# Patient Record
Sex: MALE | Race: WHITE | NOT HISPANIC OR LATINO
[De-identification: names, ages, dates, MRNs, and addresses within clinical notes are randomized per-mention and may not be internally consistent; named-entity substitution may affect disease eponyms.]

---

## 2019-04-30 PROBLEM — Z00.00 ENCOUNTER FOR PREVENTIVE HEALTH EXAMINATION: Status: ACTIVE | Noted: 2019-04-30

## 2019-05-17 DIAGNOSIS — Z86.39 PERSONAL HISTORY OF OTHER ENDOCRINE, NUTRITIONAL AND METABOLIC DISEASE: ICD-10-CM

## 2019-05-17 DIAGNOSIS — Z86.19 PERSONAL HISTORY OF OTHER INFECTIOUS AND PARASITIC DISEASES: ICD-10-CM

## 2019-05-17 DIAGNOSIS — Z82.49 FAMILY HISTORY OF ISCHEMIC HEART DISEASE AND OTHER DISEASES OF THE CIRCULATORY SYSTEM: ICD-10-CM

## 2019-05-17 DIAGNOSIS — Z87.891 PERSONAL HISTORY OF NICOTINE DEPENDENCE: ICD-10-CM

## 2019-05-17 DIAGNOSIS — Z87.01 PERSONAL HISTORY OF PNEUMONIA (RECURRENT): ICD-10-CM

## 2019-05-21 ENCOUNTER — APPOINTMENT (OUTPATIENT)
Dept: CARDIOTHORACIC SURGERY | Facility: CLINIC | Age: 79
End: 2019-05-21
Payer: MEDICARE

## 2019-05-21 VITALS
SYSTOLIC BLOOD PRESSURE: 132 MMHG | DIASTOLIC BLOOD PRESSURE: 58 MMHG | WEIGHT: 152 LBS | OXYGEN SATURATION: 96 % | RESPIRATION RATE: 16 BRPM | BODY MASS INDEX: 23.04 KG/M2 | HEIGHT: 68 IN | HEART RATE: 86 BPM

## 2019-05-21 PROCEDURE — 99205 OFFICE O/P NEW HI 60 MIN: CPT

## 2019-05-21 NOTE — ASSESSMENT
[FreeTextEntry1] : Mr. Shay is a 78 year old male with severe tricuspid regurgitation and significant shortness of breath who needs. \par \par STANLEY\par CT scan of the chest\par PFTs\par \par he will see me after the testing to assess his conditions and decide if surgery will be helpful. \par I thank you for the opportunity to participate in the care of your patients. Please do not hesitate to contact me should you have any questions.\par

## 2019-05-21 NOTE — CONSULT LETTER
[Dear  ___] : Dear  [unfilled], [Consult Letter:] : I had the pleasure of evaluating your patient, [unfilled]. [Consult Closing:] : Thank you very much for allowing me to participate in the care of this patient.  If you have any questions, please do not hesitate to contact me. [Sincerely,] : Sincerely, [DrLluvia  ___] : Dr. CONNORS [FreeTextEntry2] : Spencer Caldwell MD [FreeTextEntry3] : Ulysses Lofton MD\par  of Cardiothoracic Surgery\par [facility]\par [address] [place] [address] \[place] [address]\par [phone]\par

## 2019-05-21 NOTE — PHYSICAL EXAM
[General Appearance - Alert] : alert [General Appearance - In No Acute Distress] : in no acute distress [Sclera] : the sclera and conjunctiva were normal [Outer Ear] : the ears and nose were normal in appearance [Neck Appearance] : the appearance of the neck was normal [] : no respiratory distress [Exaggerated Use Of Accessory Muscles For Inspiration] : no accessory muscle use [Apical Impulse] : the apical impulse was normal [Heart Sounds] : normal S1 and S2 [FreeTextEntry1] : II/VI systolic murmru at the right second intercostal space [Examination Of The Chest] : the chest was normal in appearance [Arterial Pulses Carotid] : carotid pulses were normal with no bruits [Arterial Pulses Femoral] : femoral pulses were normal without bruits [Bowel Sounds] : normal bowel sounds [Abdomen Soft] : soft [Cervical Lymph Nodes Enlarged Posterior Bilaterally] : posterior cervical [Abnormal Walk] : normal gait [Nail Clubbing] : no clubbing  or cyanosis of the fingernails [Skin Color & Pigmentation] : normal skin color and pigmentation [Skin Turgor] : normal skin turgor [Cranial Nerves] : cranial nerves 2-12 were intact

## 2019-05-21 NOTE — REVIEW OF SYSTEMS
[Feeling Poorly] : feeling poorly [Feeling Tired] : feeling tired [Shortness Of Breath] : shortness of breath [SOB on Exertion] : shortness of breath during exertion [Negative] : Heme/Lymph [Chest Pain] : no chest pain [Palpitations] : no palpitations [Lower Ext Edema] : no extremity edema [Orthopnea] : no orthopnea [PND] : no PND [FreeTextEntry7] : poor appetite, frequent loose stool

## 2019-05-21 NOTE — HISTORY OF PRESENT ILLNESS
[FreeTextEntry1] : Mr. ZHOU is a 78 year old male referred by Dr Luna who presents for consultation. His past medical history includes HTN, HLD, afib (Warfarin), pneumonia 2008 complicated by septic multisystem organ failure requiring hemodialysis (now recovered).  \par \par Mr Zhou reports significant shortness of breath that has become worse over the past 6 months.  He is now unable to walk more than 10 feet before becoming extremely short of breath.  Denies any chest pain, palpitations, syncope or near syncope.  \par \par He was diagnosed with PHTN after cardiac cath was preformed.  He was also evaluated by Dr Palacios (Pulmonology) who suggested cardiac surgery evaluation.\par \par Cath and ECHO were obtained in September and October of 2018.  ECHO demonstrates LVEF 50%, moderate MR, severe pulmonary HTN, RA pressures 6-10mmHg, moderate to severe tricuspid regurgitation.  Cardiac Cath reveals normal coronary arteries with PAP of 55/20, and PCWP 8mmHg.\par \par

## 2019-05-30 ENCOUNTER — OUTPATIENT (OUTPATIENT)
Dept: OUTPATIENT SERVICES | Facility: HOSPITAL | Age: 79
LOS: 1 days | End: 2019-05-30
Payer: MEDICARE

## 2019-05-30 DIAGNOSIS — I48.2 CHRONIC ATRIAL FIBRILLATION: ICD-10-CM

## 2019-05-30 DIAGNOSIS — Z96.641 PRESENCE OF RIGHT ARTIFICIAL HIP JOINT: Chronic | ICD-10-CM

## 2019-05-30 DIAGNOSIS — I34.0 NONRHEUMATIC MITRAL (VALVE) INSUFFICIENCY: ICD-10-CM

## 2019-05-30 DIAGNOSIS — I07.1 RHEUMATIC TRICUSPID INSUFFICIENCY: ICD-10-CM

## 2019-05-30 DIAGNOSIS — I26.99 OTHER PULMONARY EMBOLISM WITHOUT ACUTE COR PULMONALE: ICD-10-CM

## 2019-05-30 DIAGNOSIS — Z83.6 FAMILY HISTORY OF OTHER DISEASES OF THE RESPIRATORY SYSTEM: Chronic | ICD-10-CM

## 2019-05-30 DIAGNOSIS — I35.1 NONRHEUMATIC AORTIC (VALVE) INSUFFICIENCY: ICD-10-CM

## 2019-05-30 DIAGNOSIS — Z98.890 OTHER SPECIFIED POSTPROCEDURAL STATES: Chronic | ICD-10-CM

## 2019-05-30 DIAGNOSIS — I36.1 NONRHEUMATIC TRICUSPID (VALVE) INSUFFICIENCY: ICD-10-CM

## 2019-05-30 DIAGNOSIS — S11.91XA LACERATION WITHOUT FOREIGN BODY OF UNSPECIFIED PART OF NECK, INITIAL ENCOUNTER: Chronic | ICD-10-CM

## 2019-05-30 LAB
ANION GAP SERPL CALC-SCNC: 13 MMOL/L — SIGNIFICANT CHANGE UP (ref 5–17)
APTT BLD: 45.2 SEC — HIGH (ref 27.5–36.3)
BUN SERPL-MCNC: 20 MG/DL — SIGNIFICANT CHANGE UP (ref 8–20)
CALCIUM SERPL-MCNC: 9.6 MG/DL — SIGNIFICANT CHANGE UP (ref 8.6–10.2)
CHLORIDE SERPL-SCNC: 100 MMOL/L — SIGNIFICANT CHANGE UP (ref 98–107)
CO2 SERPL-SCNC: 26 MMOL/L — SIGNIFICANT CHANGE UP (ref 22–29)
CREAT SERPL-MCNC: 0.82 MG/DL — SIGNIFICANT CHANGE UP (ref 0.5–1.3)
GLUCOSE SERPL-MCNC: 93 MG/DL — SIGNIFICANT CHANGE UP (ref 70–115)
HCT VFR BLD CALC: 48.8 % — SIGNIFICANT CHANGE UP (ref 42–52)
HGB BLD-MCNC: 16.1 G/DL — SIGNIFICANT CHANGE UP (ref 14–18)
INR BLD: 3.73 RATIO — HIGH (ref 0.88–1.16)
MCHC RBC-ENTMCNC: 29.5 PG — SIGNIFICANT CHANGE UP (ref 27–31)
MCHC RBC-ENTMCNC: 33 G/DL — SIGNIFICANT CHANGE UP (ref 32–36)
MCV RBC AUTO: 89.5 FL — SIGNIFICANT CHANGE UP (ref 80–94)
PLATELET # BLD AUTO: 241 K/UL — SIGNIFICANT CHANGE UP (ref 150–400)
POTASSIUM SERPL-MCNC: 4.6 MMOL/L — SIGNIFICANT CHANGE UP (ref 3.5–5.3)
POTASSIUM SERPL-SCNC: 4.6 MMOL/L — SIGNIFICANT CHANGE UP (ref 3.5–5.3)
PROTHROM AB SERPL-ACNC: 44.7 SEC — HIGH (ref 10–12.9)
RBC # BLD: 5.45 M/UL — SIGNIFICANT CHANGE UP (ref 4.6–6.2)
RBC # FLD: 16.8 % — HIGH (ref 11–15.6)
SODIUM SERPL-SCNC: 139 MMOL/L — SIGNIFICANT CHANGE UP (ref 135–145)
WBC # BLD: 7 K/UL — SIGNIFICANT CHANGE UP (ref 4.8–10.8)
WBC # FLD AUTO: 7 K/UL — SIGNIFICANT CHANGE UP (ref 4.8–10.8)

## 2019-05-30 PROCEDURE — 85027 COMPLETE CBC AUTOMATED: CPT

## 2019-05-30 PROCEDURE — 36415 COLL VENOUS BLD VENIPUNCTURE: CPT

## 2019-05-30 PROCEDURE — 80048 BASIC METABOLIC PNL TOTAL CA: CPT

## 2019-05-30 PROCEDURE — 71275 CT ANGIOGRAPHY CHEST: CPT

## 2019-05-30 PROCEDURE — 71275 CT ANGIOGRAPHY CHEST: CPT | Mod: 26

## 2019-05-30 PROCEDURE — 93010 ELECTROCARDIOGRAM REPORT: CPT

## 2019-05-30 PROCEDURE — 85610 PROTHROMBIN TIME: CPT

## 2019-05-30 PROCEDURE — 93005 ELECTROCARDIOGRAM TRACING: CPT

## 2019-05-30 PROCEDURE — 85730 THROMBOPLASTIN TIME PARTIAL: CPT

## 2019-05-30 NOTE — H&P PST ADULT - NSICDXPASTMEDICALHX_GEN_ALL_CORE_FT
PAST MEDICAL HISTORY:  Chronic atrial fibrillation     Essential hypertension     Hemochromatosis     Hyperlipidemia, unspecified hyperlipidemia type     Non-rheumatic tricuspid valve insufficiency     Nonrheumatic aortic valve insufficiency     Pneumonia     Pulmonary hypertension     Sepsis

## 2019-05-30 NOTE — H&P PST ADULT - NEGATIVE CARDIOVASCULAR SYMPTOMS
no paroxysmal nocturnal dyspnea/no palpitations/no claudication/no peripheral edema/no chest pain/no orthopnea

## 2019-05-30 NOTE — H&P PST ADULT - REASON FOR ADMISSION
Launch [employer] and print the 'Prescriptions from this Visit' Report Tricuspid and aortic valve regurgitation.

## 2019-05-30 NOTE — H&P PST ADULT - NSICDXPROBLEM_GEN_ALL_CORE_FT
PROBLEM DIAGNOSES  Problem: Acute pulmonary embolism without acute cor pulmonale, unspecified pulmonary embolism type  Assessment and Plan: Dr. Dumont called and made aware. INR supratherapeutic on warfarin. Will make Dr. Luna aware and discuss change to Eliquis.    Problem: Chronic atrial fibrillation  Assessment and Plan: Dr. Dumont called and made aware. INR supratherapeutic on warfarin. Will make Dr. Luna aware and discuss change to Eliquis.    Problem: Non-rheumatic tricuspid valve insufficiency  Assessment and Plan: Will reschedule STANLEY and patient will F/U with Dr. Lofton    Problem: Nonrheumatic aortic valve insufficiency  Assessment and Plan: Will reschedule STANLEY and patient will F/U with Dr. Lofton    Problem: Non-rheumatic mitral regurgitation  Assessment and Plan: Will reschedule STANLEY and patient will F/U with Dr. Lofton

## 2019-05-30 NOTE — H&P PST ADULT - NSICDXFAMILYHX_GEN_ALL_CORE_FT
FAMILY HISTORY:  Father  Still living? Unknown  Family history of myocardial infarction, Age at diagnosis: 61-70    Mother  Still living? Unknown  Family history of dementia, Age at diagnosis: Age Unknown    Grandparent  Still living? Unknown  Family history of diabetes mellitus, Age at diagnosis: Age Unknown

## 2019-05-30 NOTE — H&P PST ADULT - NSICDXPASTSURGICALHX_GEN_ALL_CORE_FT
PAST SURGICAL HISTORY:  Family history of punctured lung     History of bunionectomy     History of hammer toe correction     History of shoulder surgery     History of total right hip replacement     Laceration of neck, complicated

## 2019-05-30 NOTE — H&P PST ADULT - NEGATIVE NEUROLOGICAL SYMPTOMS
no syncope/no difficulty walking/no loss of consciousness/no weakness/no generalized seizures/no transient paralysis/no hemiparesis/no loss of sensation/no headache/no focal seizures/no tremors/no vertigo

## 2019-05-30 NOTE — H&P PST ADULT - OTHER CARE PROVIDERS
Spencer Luna ([facility], [address] [place], [place], Phone: [phone], Fax: [phone]), José Luis Dumont (Pulmonary, [address], [place] [address], [phone])

## 2019-05-30 NOTE — H&P PST ADULT - HISTORY OF PRESENT ILLNESS
79y/o male former smoker with history of chronic AF (CHADS2-Vasc on warfarin), aortic and tricuspid valve regurgitation, HTN, HLD, hemochromatosis, and pulmonary hypertension who has been c/o progressively worsening ARROYO (NYHA functional class 3). He saw Dr. Dumont (Pulmonary) who did a pulmonary work-up including PFT, and referred him to CT Surgery for tricuspid valve evaluation. He is here for a STANLEY to better evaluate valvular function.    Echo: 9/21/2018       LVSF: Mildly decreased       EF: 50%       RVSF: Mildly enlarged, severe pulmonary hypertension (PASP: 68)       LA: Mildly enlarged       RA: Mildly enlarged       Mitral Valve: Mild to moderate MR       Aortic Valve: Moderate AR       Tricuspid Valve: Moderate to severe TR       Pulmonic Valve: Not mentioned       Pericardium: No evidence of effusion    LHC: 10/8/2018       LM: Normal       LAD: Normal       LCX: Normal       RCA: Normal  Right Heart Pressures:       RA: 4       RV: 54/5       PA: 56/20       PCWP: 7       CO: 3.56       CI: 1.95    Nuclear Stress Test: 10/1/2018       Protocol: Kenny       Exercised for: 3:30       Symptoms: Dyspnea       Stress EKG: No changes       DTS: 3.5       Nuclear Imaging: Negative for ischemia, no TID

## 2019-05-30 NOTE — H&P PST ADULT - ASSESSMENT
77y/o male former smoker with history of chronic AF (CHADS2-Vasc on warfarin), aortic and tricuspid valve regurgitation, HTN, HLD, hemochromatosis, and pulmonary hypertension who has been c/o progressively worsening ARROYO (NYHA functional class 3). He saw Dr. Dumont (Pulmonary) who did a pulmonary work-up including PFT, and referred him to CT Surgery for tricuspid valve evaluation. He is here for a STANLEY to better evaluate valvular function.

## 2019-06-11 PROBLEM — I10 ESSENTIAL (PRIMARY) HYPERTENSION: Chronic | Status: ACTIVE | Noted: 2019-05-30

## 2019-06-11 PROBLEM — I27.20 PULMONARY HYPERTENSION, UNSPECIFIED: Chronic | Status: ACTIVE | Noted: 2019-05-30

## 2019-06-11 PROBLEM — I36.1 NONRHEUMATIC TRICUSPID (VALVE) INSUFFICIENCY: Chronic | Status: ACTIVE | Noted: 2019-05-30

## 2019-06-11 PROBLEM — E78.5 HYPERLIPIDEMIA, UNSPECIFIED: Chronic | Status: ACTIVE | Noted: 2019-05-30

## 2019-06-11 PROBLEM — I48.2 CHRONIC ATRIAL FIBRILLATION: Chronic | Status: ACTIVE | Noted: 2019-05-30

## 2019-06-11 PROBLEM — J18.9 PNEUMONIA, UNSPECIFIED ORGANISM: Chronic | Status: ACTIVE | Noted: 2019-05-30

## 2019-06-11 PROBLEM — I35.1 NONRHEUMATIC AORTIC (VALVE) INSUFFICIENCY: Chronic | Status: ACTIVE | Noted: 2019-05-30

## 2019-06-11 PROBLEM — E83.119 HEMOCHROMATOSIS, UNSPECIFIED: Chronic | Status: ACTIVE | Noted: 2019-05-30

## 2019-06-13 ENCOUNTER — FORM ENCOUNTER (OUTPATIENT)
Age: 79
End: 2019-06-13

## 2019-06-14 ENCOUNTER — TRANSCRIPTION ENCOUNTER (OUTPATIENT)
Age: 79
End: 2019-06-14

## 2019-06-14 ENCOUNTER — OUTPATIENT (OUTPATIENT)
Dept: OUTPATIENT SERVICES | Facility: HOSPITAL | Age: 79
LOS: 1 days | End: 2019-06-14
Payer: MEDICARE

## 2019-06-14 DIAGNOSIS — I07.1 RHEUMATIC TRICUSPID INSUFFICIENCY: ICD-10-CM

## 2019-06-14 DIAGNOSIS — Z96.641 PRESENCE OF RIGHT ARTIFICIAL HIP JOINT: Chronic | ICD-10-CM

## 2019-06-14 DIAGNOSIS — Z98.890 OTHER SPECIFIED POSTPROCEDURAL STATES: Chronic | ICD-10-CM

## 2019-06-14 DIAGNOSIS — S11.91XA LACERATION WITHOUT FOREIGN BODY OF UNSPECIFIED PART OF NECK, INITIAL ENCOUNTER: Chronic | ICD-10-CM

## 2019-06-14 DIAGNOSIS — Z83.6 FAMILY HISTORY OF OTHER DISEASES OF THE RESPIRATORY SYSTEM: Chronic | ICD-10-CM

## 2019-06-14 LAB
INR BLD: 3.07 RATIO — HIGH (ref 0.88–1.16)
PROTHROM AB SERPL-ACNC: 36.5 SEC — HIGH (ref 10–12.9)

## 2019-06-14 PROCEDURE — 93010 ELECTROCARDIOGRAM REPORT: CPT

## 2019-06-14 PROCEDURE — 76376 3D RENDER W/INTRP POSTPROCES: CPT | Mod: 26

## 2019-06-14 PROCEDURE — 93320 DOPPLER ECHO COMPLETE: CPT | Mod: 26

## 2019-06-14 PROCEDURE — 93325 DOPPLER ECHO COLOR FLOW MAPG: CPT | Mod: 26

## 2019-06-14 PROCEDURE — 93312 ECHO TRANSESOPHAGEAL: CPT | Mod: 26

## 2019-06-14 RX ORDER — CITALOPRAM 10 MG/1
1 TABLET, FILM COATED ORAL
Qty: 0 | Refills: 0 | DISCHARGE

## 2019-06-14 NOTE — H&P PST ADULT - NSICDXPASTMEDICALHX_GEN_ALL_CORE_FT
PAST MEDICAL HISTORY:  Chronic atrial fibrillation     Essential hypertension     ETOH abuse     Hemochromatosis     Hyperlipidemia, unspecified hyperlipidemia type     Non-rheumatic tricuspid valve insufficiency     Nonrheumatic aortic valve insufficiency     Pneumonia     Pulmonary hypertension     Sepsis

## 2019-06-14 NOTE — ASU PATIENT PROFILE, ADULT - TEACHING/LEARNING LEARNING PREFERENCES
verbal instruction/pictorial/computer/internet/skill demonstration/written material/individual instruction

## 2019-06-14 NOTE — DISCHARGE NOTE NURSING/CASE MANAGEMENT/SOCIAL WORK - NSDCDPATPORTLINK_GEN_ALL_CORE
You can access the [facility] Portal, offered by [facility], by registering with the following website: [email or web address]

## 2019-06-14 NOTE — DISCHARGE NOTE PROVIDER - CARE PROVIDERS DIRECT ADDRESSES
[email or web address]

## 2019-06-14 NOTE — H&P PST ADULT - VENOUS THROMBOEMBOLISM
Spoke to patient, via telephone. Patient attended the colon class @ [facility]. Patient is scheduled with Dr Fuentes at the [facility], on 06/10/19. Patient's prep has already been sent to the pharmacy.     
no

## 2019-06-14 NOTE — PROGRESS NOTE ADULT - SUBJECTIVE AND OBJECTIVE BOX
Cardiology Nurse practitioner Note  Post STANLEY    Per verbal report: +PFO, severe TR, mod MR, mild AI.  Pt aurelia procedure well    VSS    Neuro: sleepy but easily arousable.  Orientated X3  Lungs: CTA  CV: RRR    Discharge to home when criteria met

## 2019-06-14 NOTE — DISCHARGE NOTE PROVIDER - NSDCCPTREATMENT_GEN_ALL_CORE_FT
PRINCIPAL PROCEDURE  Procedure: Transesophageal echocardiogram (STANLEY)  Findings and Treatment: severe TR, +pfo, mod MR

## 2019-06-14 NOTE — ASU PATIENT PROFILE, ADULT - PSH
Family history of punctured lung    History of bunionectomy    History of hammer toe correction    History of shoulder surgery    History of total right hip replacement    Laceration of neck, complicated

## 2019-06-14 NOTE — DISCHARGE NOTE PROVIDER - NSDCCPCAREPLAN_GEN_ALL_CORE_FT
PRINCIPAL DISCHARGE DIAGNOSIS  Diagnosis: Tricuspid regurgitation  Assessment and Plan of Treatment: f/U CTS

## 2019-06-14 NOTE — H&P PST ADULT - HISTORY OF PRESENT ILLNESS
78 year old male with pmhx of pHTN,  HTN, HLD, afib on coumadin, PNA 2008 c/b septic multisystme organ failure requiring hemodialysis now resolved. Pt with c/o increase SOB over the last 6 months and only able to walk 10 feet.    Echo revealed EF 50%, mod MR, severe pHTN, RA pressure 6-10 mmHg, mod to severe TR  Cardiac cath revealed normal coronary arteries with PAP of 55/20 and PCWP 8mmHg.  Pt had consult with Dr. Lofton for possible tricuspid valve repair.    For STANLEY to assess cardiac valves.

## 2019-06-14 NOTE — DISCHARGE NOTE PROVIDER - HOSPITAL COURSE
78 year old male with h/o pHTN, HTN, Afib on coumadin who has been c/o increasing SOB over the last 6 months.  TTE revealed mod MR, severe pHTN and mod to  TR.  Now s/p STANLEY which revealed +PFO, severe TR, mod MR and mild AI.  Pt will f/u with Dr. Lofton for possible surgical intervention.

## 2019-06-14 NOTE — DISCHARGE NOTE PROVIDER - CARE PROVIDER_API CALL
Ulysses Lofton)  Surgery; Thoracic and Cardiac Surgery  [address] [place] [address]  Phone: [phone]  Fax: [phone]  Follow Up Time:     Spencer Luna)  Cardiovascular Disease  Phone: [phone]  Follow Up Time:

## 2019-06-14 NOTE — ASU PATIENT PROFILE, ADULT - PMH
Chronic atrial fibrillation    Essential hypertension    ETOH abuse    Hemochromatosis    Hyperlipidemia, unspecified hyperlipidemia type    Non-rheumatic tricuspid valve insufficiency    Nonrheumatic aortic valve insufficiency    Pneumonia    Pulmonary hypertension    Sepsis

## 2019-06-14 NOTE — H&P PST ADULT - ASSESSMENT
78 year old male with pHTN, HTN, MR, TR with c/o increasing SOB for STANLEY to assess cardiac valves.

## 2019-06-16 ENCOUNTER — EMERGENCY (EMERGENCY)
Facility: HOSPITAL | Age: 79
LOS: 1 days | End: 2019-06-16
Admitting: EMERGENCY MEDICINE
Payer: MEDICARE

## 2019-06-16 DIAGNOSIS — Z98.890 OTHER SPECIFIED POSTPROCEDURAL STATES: Chronic | ICD-10-CM

## 2019-06-16 DIAGNOSIS — S11.91XA LACERATION WITHOUT FOREIGN BODY OF UNSPECIFIED PART OF NECK, INITIAL ENCOUNTER: Chronic | ICD-10-CM

## 2019-06-16 DIAGNOSIS — Z83.6 FAMILY HISTORY OF OTHER DISEASES OF THE RESPIRATORY SYSTEM: Chronic | ICD-10-CM

## 2019-06-16 DIAGNOSIS — Z96.641 PRESENCE OF RIGHT ARTIFICIAL HIP JOINT: Chronic | ICD-10-CM

## 2019-06-16 PROCEDURE — 72125 CT NECK SPINE W/O DYE: CPT | Mod: 26

## 2019-06-16 PROCEDURE — 99285 EMERGENCY DEPT VISIT HI MDM: CPT

## 2019-06-16 PROCEDURE — 70450 CT HEAD/BRAIN W/O DYE: CPT | Mod: 26

## 2019-06-17 ENCOUNTER — INPATIENT (INPATIENT)
Facility: HOSPITAL | Age: 79
LOS: 3 days | Discharge: HOME | DRG: 64 | End: 2019-06-21
Attending: HOSPITALIST | Admitting: STUDENT IN AN ORGANIZED HEALTH CARE EDUCATION/TRAINING PROGRAM
Payer: MEDICARE

## 2019-06-17 VITALS
RESPIRATION RATE: 27 BRPM | OXYGEN SATURATION: 97 % | HEART RATE: 74 BPM | DIASTOLIC BLOOD PRESSURE: 62 MMHG | HEIGHT: 68 IN | WEIGHT: 153.88 LBS | SYSTOLIC BLOOD PRESSURE: 117 MMHG | TEMPERATURE: 98 F

## 2019-06-17 DIAGNOSIS — S11.91XA LACERATION WITHOUT FOREIGN BODY OF UNSPECIFIED PART OF NECK, INITIAL ENCOUNTER: Chronic | ICD-10-CM

## 2019-06-17 DIAGNOSIS — I60.9 NONTRAUMATIC SUBARACHNOID HEMORRHAGE, UNSPECIFIED: ICD-10-CM

## 2019-06-17 DIAGNOSIS — Z96.641 PRESENCE OF RIGHT ARTIFICIAL HIP JOINT: Chronic | ICD-10-CM

## 2019-06-17 DIAGNOSIS — Z98.890 OTHER SPECIFIED POSTPROCEDURAL STATES: Chronic | ICD-10-CM

## 2019-06-17 DIAGNOSIS — Z83.6 FAMILY HISTORY OF OTHER DISEASES OF THE RESPIRATORY SYSTEM: Chronic | ICD-10-CM

## 2019-06-17 DIAGNOSIS — S06.6X0A TRAUMATIC SUBARACHNOID HEMORRHAGE WITHOUT LOSS OF CONSCIOUSNESS, INITIAL ENCOUNTER: ICD-10-CM

## 2019-06-17 PROBLEM — F10.10 ALCOHOL ABUSE, UNCOMPLICATED: Chronic | Status: ACTIVE | Noted: 2019-06-14

## 2019-06-17 LAB
ABO RH CONFIRMATION: SIGNIFICANT CHANGE UP
ALBUMIN SERPL ELPH-MCNC: 3.8 G/DL — SIGNIFICANT CHANGE UP (ref 3.3–5.2)
ALP SERPL-CCNC: 92 U/L — SIGNIFICANT CHANGE UP (ref 40–120)
ALT FLD-CCNC: 32 U/L — SIGNIFICANT CHANGE UP
ANION GAP SERPL CALC-SCNC: 14 MMOL/L — SIGNIFICANT CHANGE UP (ref 5–17)
ANION GAP SERPL CALC-SCNC: 14 MMOL/L — SIGNIFICANT CHANGE UP (ref 5–17)
APPEARANCE UR: CLEAR — SIGNIFICANT CHANGE UP
APTT BLD: 29.6 SEC — SIGNIFICANT CHANGE UP (ref 27.5–36.3)
AST SERPL-CCNC: 51 U/L — HIGH
BASOPHILS # BLD AUTO: 0 K/UL — SIGNIFICANT CHANGE UP (ref 0–0.2)
BASOPHILS # BLD AUTO: 0 K/UL — SIGNIFICANT CHANGE UP (ref 0–0.2)
BASOPHILS NFR BLD AUTO: 0.1 % — SIGNIFICANT CHANGE UP (ref 0–2)
BASOPHILS NFR BLD AUTO: 0.1 % — SIGNIFICANT CHANGE UP (ref 0–2)
BILIRUB SERPL-MCNC: 0.9 MG/DL — SIGNIFICANT CHANGE UP (ref 0.4–2)
BILIRUB UR-MCNC: NEGATIVE — SIGNIFICANT CHANGE UP
BLD GP AB SCN SERPL QL: SIGNIFICANT CHANGE UP
BUN SERPL-MCNC: 21 MG/DL — HIGH (ref 8–20)
BUN SERPL-MCNC: 22 MG/DL — HIGH (ref 8–20)
CALCIUM SERPL-MCNC: 8.7 MG/DL — SIGNIFICANT CHANGE UP (ref 8.6–10.2)
CALCIUM SERPL-MCNC: 8.9 MG/DL — SIGNIFICANT CHANGE UP (ref 8.6–10.2)
CHLORIDE SERPL-SCNC: 104 MMOL/L — SIGNIFICANT CHANGE UP (ref 98–107)
CHLORIDE SERPL-SCNC: 107 MMOL/L — SIGNIFICANT CHANGE UP (ref 98–107)
CO2 SERPL-SCNC: 22 MMOL/L — SIGNIFICANT CHANGE UP (ref 22–29)
CO2 SERPL-SCNC: 24 MMOL/L — SIGNIFICANT CHANGE UP (ref 22–29)
COLOR SPEC: YELLOW — SIGNIFICANT CHANGE UP
CREAT SERPL-MCNC: 0.81 MG/DL — SIGNIFICANT CHANGE UP (ref 0.5–1.3)
CREAT SERPL-MCNC: 1.04 MG/DL — SIGNIFICANT CHANGE UP (ref 0.5–1.3)
DIFF PNL FLD: ABNORMAL
EOSINOPHIL # BLD AUTO: 0 K/UL — SIGNIFICANT CHANGE UP (ref 0–0.5)
EOSINOPHIL # BLD AUTO: 0.1 K/UL — SIGNIFICANT CHANGE UP (ref 0–0.5)
EOSINOPHIL NFR BLD AUTO: 0.1 % — SIGNIFICANT CHANGE UP (ref 0–5)
EOSINOPHIL NFR BLD AUTO: 1.3 % — SIGNIFICANT CHANGE UP (ref 0–5)
ETHANOL SERPL-MCNC: 15 MG/DL — SIGNIFICANT CHANGE UP
GAS PNL BLDA: SIGNIFICANT CHANGE UP
GLUCOSE BLDC GLUCOMTR-MCNC: 122 MG/DL — HIGH (ref 70–99)
GLUCOSE BLDC GLUCOMTR-MCNC: 85 MG/DL — SIGNIFICANT CHANGE UP (ref 70–99)
GLUCOSE BLDC GLUCOMTR-MCNC: 98 MG/DL — SIGNIFICANT CHANGE UP (ref 70–99)
GLUCOSE SERPL-MCNC: 137 MG/DL — HIGH (ref 70–115)
GLUCOSE SERPL-MCNC: 139 MG/DL — HIGH (ref 70–115)
GLUCOSE UR QL: NEGATIVE MG/DL — SIGNIFICANT CHANGE UP
HCT VFR BLD CALC: 43.5 % — SIGNIFICANT CHANGE UP (ref 42–52)
HCT VFR BLD CALC: 43.9 % — SIGNIFICANT CHANGE UP (ref 42–52)
HGB BLD-MCNC: 13.8 G/DL — LOW (ref 14–18)
HGB BLD-MCNC: 14.1 G/DL — SIGNIFICANT CHANGE UP (ref 14–18)
INR BLD: 1.23 RATIO — HIGH (ref 0.88–1.16)
KETONES UR-MCNC: NEGATIVE — SIGNIFICANT CHANGE UP
LACTATE SERPL-SCNC: 1.3 MMOL/L — SIGNIFICANT CHANGE UP (ref 0.5–2)
LACTATE SERPL-SCNC: 2.6 MMOL/L — HIGH (ref 0.5–2)
LEUKOCYTE ESTERASE UR-ACNC: ABNORMAL
LIDOCAIN IGE QN: 44 U/L — SIGNIFICANT CHANGE UP (ref 22–51)
LYMPHOCYTES # BLD AUTO: 0.7 K/UL — LOW (ref 1–4.8)
LYMPHOCYTES # BLD AUTO: 1.1 K/UL — SIGNIFICANT CHANGE UP (ref 1–4.8)
LYMPHOCYTES # BLD AUTO: 16.1 % — LOW (ref 20–55)
LYMPHOCYTES # BLD AUTO: 8.6 % — LOW (ref 20–55)
MAGNESIUM SERPL-MCNC: 1.9 MG/DL — SIGNIFICANT CHANGE UP (ref 1.6–2.6)
MCHC RBC-ENTMCNC: 29.1 PG — SIGNIFICANT CHANGE UP (ref 27–31)
MCHC RBC-ENTMCNC: 29.1 PG — SIGNIFICANT CHANGE UP (ref 27–31)
MCHC RBC-ENTMCNC: 31.7 G/DL — LOW (ref 32–36)
MCHC RBC-ENTMCNC: 32.1 G/DL — SIGNIFICANT CHANGE UP (ref 32–36)
MCV RBC AUTO: 90.7 FL — SIGNIFICANT CHANGE UP (ref 80–94)
MCV RBC AUTO: 91.6 FL — SIGNIFICANT CHANGE UP (ref 80–94)
MONOCYTES # BLD AUTO: 0.7 K/UL — SIGNIFICANT CHANGE UP (ref 0–0.8)
MONOCYTES # BLD AUTO: 0.8 K/UL — SIGNIFICANT CHANGE UP (ref 0–0.8)
MONOCYTES NFR BLD AUTO: 9 % — SIGNIFICANT CHANGE UP (ref 3–10)
MONOCYTES NFR BLD AUTO: 9.8 % — SIGNIFICANT CHANGE UP (ref 3–10)
NEUTROPHILS # BLD AUTO: 5 K/UL — SIGNIFICANT CHANGE UP (ref 1.8–8)
NEUTROPHILS # BLD AUTO: 7 K/UL — SIGNIFICANT CHANGE UP (ref 1.8–8)
NEUTROPHILS NFR BLD AUTO: 72.4 % — SIGNIFICANT CHANGE UP (ref 37–73)
NEUTROPHILS NFR BLD AUTO: 82 % — HIGH (ref 37–73)
NITRITE UR-MCNC: NEGATIVE — SIGNIFICANT CHANGE UP
PH UR: 5 — SIGNIFICANT CHANGE UP (ref 5–8)
PHOSPHATE SERPL-MCNC: 3.1 MG/DL — SIGNIFICANT CHANGE UP (ref 2.4–4.7)
PLATELET # BLD AUTO: 226 K/UL — SIGNIFICANT CHANGE UP (ref 150–400)
PLATELET # BLD AUTO: 234 K/UL — SIGNIFICANT CHANGE UP (ref 150–400)
POTASSIUM SERPL-MCNC: 3.9 MMOL/L — SIGNIFICANT CHANGE UP (ref 3.5–5.3)
POTASSIUM SERPL-MCNC: 4 MMOL/L — SIGNIFICANT CHANGE UP (ref 3.5–5.3)
POTASSIUM SERPL-SCNC: 3.9 MMOL/L — SIGNIFICANT CHANGE UP (ref 3.5–5.3)
POTASSIUM SERPL-SCNC: 4 MMOL/L — SIGNIFICANT CHANGE UP (ref 3.5–5.3)
PROT SERPL-MCNC: 6.8 G/DL — SIGNIFICANT CHANGE UP (ref 6.6–8.7)
PROT UR-MCNC: 30 MG/DL
PROTHROM AB SERPL-ACNC: 14.2 SEC — HIGH (ref 10–12.9)
RBC # BLD: 4.75 M/UL — SIGNIFICANT CHANGE UP (ref 4.6–6.2)
RBC # BLD: 4.84 M/UL — SIGNIFICANT CHANGE UP (ref 4.6–6.2)
RBC # FLD: 17.4 % — HIGH (ref 11–15.6)
RBC # FLD: 17.5 % — HIGH (ref 11–15.6)
SODIUM SERPL-SCNC: 140 MMOL/L — SIGNIFICANT CHANGE UP (ref 135–145)
SODIUM SERPL-SCNC: 145 MMOL/L — SIGNIFICANT CHANGE UP (ref 135–145)
SP GR SPEC: 1.02 — SIGNIFICANT CHANGE UP (ref 1.01–1.02)
TYPE + AB SCN PNL BLD: SIGNIFICANT CHANGE UP
UROBILINOGEN FLD QL: 4 MG/DL
WBC # BLD: 7 K/UL — SIGNIFICANT CHANGE UP (ref 4.8–10.8)
WBC # BLD: 8.5 K/UL — SIGNIFICANT CHANGE UP (ref 4.8–10.8)
WBC # FLD AUTO: 7 K/UL — SIGNIFICANT CHANGE UP (ref 4.8–10.8)
WBC # FLD AUTO: 8.5 K/UL — SIGNIFICANT CHANGE UP (ref 4.8–10.8)

## 2019-06-17 PROCEDURE — 99285 EMERGENCY DEPT VISIT HI MDM: CPT

## 2019-06-17 PROCEDURE — 93970 EXTREMITY STUDY: CPT | Mod: 26

## 2019-06-17 PROCEDURE — 93010 ELECTROCARDIOGRAM REPORT: CPT

## 2019-06-17 PROCEDURE — 71045 X-RAY EXAM CHEST 1 VIEW: CPT | Mod: 26

## 2019-06-17 PROCEDURE — 99221 1ST HOSP IP/OBS SF/LOW 40: CPT

## 2019-06-17 PROCEDURE — 70450 CT HEAD/BRAIN W/O DYE: CPT | Mod: 26

## 2019-06-17 PROCEDURE — 99223 1ST HOSP IP/OBS HIGH 75: CPT

## 2019-06-17 PROCEDURE — 99232 SBSQ HOSP IP/OBS MODERATE 35: CPT

## 2019-06-17 RX ORDER — ATORVASTATIN CALCIUM 80 MG/1
10 TABLET, FILM COATED ORAL AT BEDTIME
Refills: 0 | Status: DISCONTINUED | OUTPATIENT
Start: 2019-06-17 | End: 2019-06-19

## 2019-06-17 RX ORDER — LANOLIN ALCOHOL/MO/W.PET/CERES
3 CREAM (GRAM) TOPICAL AT BEDTIME
Refills: 0 | Status: DISCONTINUED | OUTPATIENT
Start: 2019-06-17 | End: 2019-06-21

## 2019-06-17 RX ORDER — ONDANSETRON 8 MG/1
4 TABLET, FILM COATED ORAL EVERY 6 HOURS
Refills: 0 | Status: DISCONTINUED | OUTPATIENT
Start: 2019-06-17 | End: 2019-06-21

## 2019-06-17 RX ORDER — PANTOPRAZOLE SODIUM 20 MG/1
40 TABLET, DELAYED RELEASE ORAL DAILY
Refills: 0 | Status: DISCONTINUED | OUTPATIENT
Start: 2019-06-17 | End: 2019-06-21

## 2019-06-17 RX ORDER — METOPROLOL TARTRATE 50 MG
12.5 TABLET ORAL DAILY
Refills: 0 | Status: DISCONTINUED | OUTPATIENT
Start: 2019-06-17 | End: 2019-06-17

## 2019-06-17 RX ORDER — CHLORHEXIDINE GLUCONATE 213 G/1000ML
1 SOLUTION TOPICAL
Refills: 0 | Status: DISCONTINUED | OUTPATIENT
Start: 2019-06-17 | End: 2019-06-21

## 2019-06-17 RX ORDER — SODIUM CHLORIDE 9 MG/ML
500 INJECTION INTRAMUSCULAR; INTRAVENOUS; SUBCUTANEOUS ONCE
Refills: 0 | Status: COMPLETED | OUTPATIENT
Start: 2019-06-17 | End: 2019-06-17

## 2019-06-17 RX ORDER — ACETAMINOPHEN 500 MG
650 TABLET ORAL EVERY 6 HOURS
Refills: 0 | Status: DISCONTINUED | OUTPATIENT
Start: 2019-06-17 | End: 2019-06-21

## 2019-06-17 RX ORDER — CITALOPRAM 10 MG/1
20 TABLET, FILM COATED ORAL DAILY
Refills: 0 | Status: DISCONTINUED | OUTPATIENT
Start: 2019-06-17 | End: 2019-06-21

## 2019-06-17 RX ORDER — SODIUM CHLORIDE 9 MG/ML
1000 INJECTION, SOLUTION INTRAVENOUS
Refills: 0 | Status: DISCONTINUED | OUTPATIENT
Start: 2019-06-17 | End: 2019-06-18

## 2019-06-17 RX ORDER — DOCUSATE SODIUM 100 MG
100 CAPSULE ORAL EVERY 8 HOURS
Refills: 0 | Status: DISCONTINUED | OUTPATIENT
Start: 2019-06-17 | End: 2019-06-21

## 2019-06-17 RX ORDER — SODIUM CHLORIDE 9 MG/ML
500 INJECTION, SOLUTION INTRAVENOUS ONCE
Refills: 0 | Status: COMPLETED | OUTPATIENT
Start: 2019-06-17 | End: 2019-06-17

## 2019-06-17 RX ORDER — SODIUM CHLORIDE 9 MG/ML
1000 INJECTION INTRAMUSCULAR; INTRAVENOUS; SUBCUTANEOUS
Refills: 0 | Status: DISCONTINUED | OUTPATIENT
Start: 2019-06-17 | End: 2019-06-17

## 2019-06-17 RX ORDER — METOPROLOL TARTRATE 50 MG
12.5 TABLET ORAL DAILY
Refills: 0 | Status: DISCONTINUED | OUTPATIENT
Start: 2019-06-17 | End: 2019-06-18

## 2019-06-17 RX ORDER — SENNA PLUS 8.6 MG/1
2 TABLET ORAL AT BEDTIME
Refills: 0 | Status: DISCONTINUED | OUTPATIENT
Start: 2019-06-17 | End: 2019-06-21

## 2019-06-17 RX ADMIN — SODIUM CHLORIDE 250 MILLILITER(S): 9 INJECTION INTRAMUSCULAR; INTRAVENOUS; SUBCUTANEOUS at 01:42

## 2019-06-17 RX ADMIN — Medication 12.5 MILLIGRAM(S): at 05:34

## 2019-06-17 RX ADMIN — Medication 650 MILLIGRAM(S): at 23:50

## 2019-06-17 RX ADMIN — SODIUM CHLORIDE 75 MILLILITER(S): 9 INJECTION INTRAMUSCULAR; INTRAVENOUS; SUBCUTANEOUS at 01:17

## 2019-06-17 RX ADMIN — SODIUM CHLORIDE 500 MILLILITER(S): 9 INJECTION, SOLUTION INTRAVENOUS at 13:41

## 2019-06-17 RX ADMIN — Medication 650 MILLIGRAM(S): at 17:07

## 2019-06-17 RX ADMIN — Medication 100 MILLIGRAM(S): at 05:34

## 2019-06-17 RX ADMIN — SODIUM CHLORIDE 75 MILLILITER(S): 9 INJECTION, SOLUTION INTRAVENOUS at 22:08

## 2019-06-17 RX ADMIN — Medication 650 MILLIGRAM(S): at 11:02

## 2019-06-17 RX ADMIN — Medication 3 MILLIGRAM(S): at 21:50

## 2019-06-17 RX ADMIN — PANTOPRAZOLE SODIUM 40 MILLIGRAM(S): 20 TABLET, DELAYED RELEASE ORAL at 11:02

## 2019-06-17 RX ADMIN — SODIUM CHLORIDE 75 MILLILITER(S): 9 INJECTION INTRAMUSCULAR; INTRAVENOUS; SUBCUTANEOUS at 01:40

## 2019-06-17 RX ADMIN — CITALOPRAM 20 MILLIGRAM(S): 10 TABLET, FILM COATED ORAL at 11:02

## 2019-06-17 RX ADMIN — Medication 650 MILLIGRAM(S): at 05:36

## 2019-06-17 RX ADMIN — CHLORHEXIDINE GLUCONATE 1 APPLICATION(S): 213 SOLUTION TOPICAL at 11:02

## 2019-06-17 RX ADMIN — ATORVASTATIN CALCIUM 10 MILLIGRAM(S): 80 TABLET, FILM COATED ORAL at 21:50

## 2019-06-17 RX ADMIN — SODIUM CHLORIDE 75 MILLILITER(S): 9 INJECTION, SOLUTION INTRAVENOUS at 13:41

## 2019-06-17 RX ADMIN — Medication 100 MILLIGRAM(S): at 21:50

## 2019-06-17 RX ADMIN — Medication 650 MILLIGRAM(S): at 05:34

## 2019-06-17 NOTE — CONSULT NOTE ADULT - ASSESSMENT
A/P:  77 y/o M with a PMHx of pulmonary hypertension, severe TR (on STANLEY 06/14/19), large PFO, Afib on coumadin, Pulmonary embolism (CTA 05/30/19), PNA in 2008 complicated by septic multisystem organ failure requiring HD, resolved, Hemochromatosis, HTN, HLD, and who was transferred from [facility] after fall with questionable syncope with right SAH. Patient states that he was drinking beers and a double screwdriver yesterday, and thinks he slipped and fell on the floor. Unclear if patient had a syncopal event or not, patient doesn't think he passed out, but can't be completely sure. Patient states that he has been experiencing dyspnea on exertion for a long time, being worked up by Pulm, CT surgery, and Cardiology. Patient  is currently resting comfortably, denies SOB at rest. Patient hasn't followed up with his Cardiologist since his recent STANLEY. Patient denies any fevers, chills, CP, LE swelling, headache, dizziness, abdominal pain, N/V/D, vision changes, unilateral weakness.     1. Severe Pulmonary HTN and RV failure  - WHO class 4   - PAP 55/20 and PCWP 8 mmHG on cardiac cath 10/2018  - STANLEY on 06/14/19 showed EF 50-55%, severe pulmonary HTN, severely reduced RV fxn, severe TR, and + PFO  - Not decompensated at this time, chronic right heart failure  - Recommend Pulm evaluation  - No indication for diuresis, ACEi, or aldactone  - CT surgery to re-evaluate for TR, PFO, and PEs     2. NSVT  - Secondary to severe right heart failure   - D/C metoprolol tartrate, trial Toprol XL 25mg qday (start at 5 PM tonight)  - Continue telemetry monitoring  - Keep K>4, Mg>2    3. Pulmonary Embolism  - RUL and RLL segmental nonobstructing PE noted on CTA 05/30/19  - CT Surgery to evaluate and comment on whether candidate for pulmonary endarterectomy  - Restart coumadin when cleared from neurosurgery for right SAH    4. Afib  - Currently in SB   - Toprol XL 25 daily  - Restart coumadin as above    5. Hemochromatosis  - Heme consult A/P:  79 y/o M with a PMHx of pulmonary hypertension, severe TR (on STANLEY 06/14/19), large PFO, Afib on coumadin, Pulmonary embolism (CTA 05/30/19), PNA in 2008 complicated by septic multisystem organ failure requiring HD, resolved, Hemochromatosis, HTN, HLD, and who [place] [facility] [place] with questionable syncope with right SAH. Patient states that he was drinking beers and a double screwdriver yesterday, and thinks he slipped and fell on the floor. Unclear if patient had a syncopal event or not, patient doesn't think he passed out, but can't be completely sure. Patient states that he has been experiencing dyspnea on exertion for a long time, being worked up by Pulm, CT surgery, and Cardiology. Patient  is currently resting comfortably, denies SOB at rest. Patient hasn't followed up with his Cardiologist since his recent STANLEY. Patient denies any fevers, chills, CP, LE swelling, headache, dizziness, abdominal pain, N/V/D, vision changes, unilateral weakness.     1. Severe Pulmonary HTN and RV failure  - WHO class 4   - PAP 55/20 and PCWP 8 mmHG on cardiac cath 10/2018  - STANLEY on 06/14/19 showed EF 50-55%, severe pulmonary HTN, severely reduced RV fxn, severe TR, and + PFO  - Not decompensated at this time, chronic right heart failure  - Recommend Pulm evaluation  - No indication for diuresis, ACEi, or aldactone  - CT surgery to re-evaluate for TR, PFO, and PEs     2. NSVT  - Secondary to severe right heart failure   - D/C metoprolol tartrate, trial Toprol XL 25mg qday (start at 5 PM tonight)  - Continue telemetry monitoring  - Keep K>4, Mg>2    3. Pulmonary Embolism  - RUL and RLL segmental nonobstructing PE noted on CTA 05/30/19  - Recommend CTA chest today to r/o new acute Pulmonary embolism in setting of NSVT and ?syncope   - CT Surgery to evaluate and comment on whether candidate for pulmonary endarterectomy  - Restart coumadin when cleared from neurosurgery for right SAH    4. Afib  - Currently in SB   - Toprol XL 25 daily  - Restart coumadin as above    5. Hemochromatosis  - Heme consult A/P:  77 y/o M with a PMHx of pulmonary hypertension, severe TR (on STANLEY 06/14/19), large PFO, Afib on coumadin, Pulmonary embolism (CTA 05/30/19), PNA in 2008 complicated by septic multisystem organ failure requiring HD, resolved, Hemochromatosis, HTN, HLD, and who was [place] [facility] [place] with questionable syncope with right SAH. Patient states that he was drinking beers and a double screwdriver yesterday, and thinks he slipped and fell on the floor. Unclear if patient had a syncopal event or not, patient doesn't think he passed out, but can't be completely sure. Patient states that he has been experiencing dyspnea on exertion for a long time, being worked up by Pulm, CT surgery, and Cardiology. Patient  is currently resting comfortably, denies SOB at rest. Patient hasn't followed up with his Cardiologist since his recent STANLEY. Patient denies any fevers, chills, CP, LE swelling, headache, dizziness, abdominal pain, N/V/D, vision changes, unilateral weakness.     1. Severe Pulmonary HTN and RV failure  - WHO class 4   - PAP 55/20 and PCWP 8 mmHG on cardiac cath 10/2018  - STANLEY on 06/14/19 showed EF 50-55%, severe pulmonary HTN, severely reduced RV fxn, severe TR, and + PFO  - Not decompensated at this time, chronic right heart failure  - Recommend Pulm evaluation  - Recommend V/Q scan as well.   - No indication for diuresis, ACEi, or aldactone  - CT surgery to re-evaluate for TR, PFO, and PEs   - Plan for eventual RHC     2. NSVT  - Secondary to severe right heart failure   - D/C metoprolol tartrate, trial Toprol XL 25mg qday (start at 5 PM tonight)  - Continue telemetry monitoring  - Keep K>4, Mg>2    3. Pulmonary Embolism  - RUL and RLL segmental non-obstructing PE noted on CTA 05/30/19  - Recommend CTA chest today to r/o new acute Pulmonary embolism in setting of NSVT and ?syncope   - CT Surgery to evaluate and comment on whether pt is a candidate for pulmonary endarterectomy  - Restart coumadin when cleared from neurosurgery for right SAH    4. Afib  - Currently in SB   - Toprol XL 25 daily  - Restart coumadin as above    5. Hemochromatosis  - Heme consult

## 2019-06-17 NOTE — PROGRESS NOTE ADULT - ATTENDING COMMENTS
NSGY Attg:    see above    CT likely consistent with calcification    consider MRI brain to definitively assess ICH versus calcification

## 2019-06-17 NOTE — CONSULT NOTE ADULT - ASSESSMENT
s/p fall, no LOC, found to have a tiny amount of high medial parietal SAH. No IPH or calvarial Fx, chronic findings on [facility]  + coumadin for AFib, INR 5.1 at [facility]  pt at his baseline and grossly intact   code B activated/ at scene at 00:06 & images from CD reviewed     case d/w Dr Cummings and images reviewed   no immediate NSx intervention indicated at this time  neuro-checks q1 HR and ICU admit  repeat CTB w/o contrast at 6 AM - ACS team aware   HOB > 30 degrees  hold all AC/ pt denied ASA/ NSAIDs use   further care as per primary team

## 2019-06-17 NOTE — ED PROVIDER NOTE - MUSCULOSKELETAL, MLM
Spine appears normal, range of motion is not limited, no muscle or joint tenderness Spine appears normal, range of motion is not limited, no muscle or joint tenderness. No spinal tenderness, no step offs

## 2019-06-17 NOTE — ED PROVIDER NOTE - CARDIAC, MLM
Normal rate, regular rhythm.  Heart sounds S1, S2.  No murmurs, rubs or gallops. No chest wall tenderness. NO crepitus. Normal rate, regular rhythm.  Heart sounds S1, S2.  No murmurs, rubs or gallops. No chest wall tenderness. No crepitus.

## 2019-06-17 NOTE — H&P ADULT - HISTORY OF PRESENT ILLNESS
78 year old male transferred from [facility], admitted for witnessed mechanical fall while on Coumadin for Afib. CT head done in [facility] revealed right SAH, patient GCS 15 upon arrival. Admitted to SICU.    Primary Survey:  A: Protected, patient conversing  B: CTAB. Symmetrical chest rise  C: 2+ central (femoral) & peripheral pulses (Radial, DP)  D: GCS 15, MAEO, interacting. No andres disability noted  E: No gross deformities on primary exposure      CXR: Negative for evidence of hemo/pneumothorax    Secondary Survey:  Constitutional: Well-developed well nourished Male in no acute distress  HEENT: Head is normocephalic and atraumatic, maxillofacial structures stable, no blood or discharge from nares or oral cavity, no siegel sign / racoon eyes, EOMI b/l, pupils 3 mm round and reactive to light b/l, no active drainage or redness  Respiratory: Breath sounds CTA b/l respirations are unlabored, no accessory muscle use, no conversational dyspnea  Cardiovascular: Regular rate & rhythm, +S1, S2  Chest: Chest wall is non-tender to palpation, no subQ emphysema or crepitus palpated  Gastrointestinal: Abdomen soft, non-tender, non-distended, no rebound tenderness / guarding, no ecchymosis or external signs of abdominal trauma  Extremities: moving all extremities spontaneously, no point tenderness or deformity noted to upper or lower extremities b/l  Pelvis: stable  Vascular: 2+ radial, femoral, and DP pulses b/l  Neurological: GCS: 15 (4/5/6). A&O x 3; no gross sensory / motor / coordination deficits  Musculoskeletal: 5/5 strength of upper and lower extremities b/l  Back: no C/T/LS spine tenderness to palpation, no step-offs or signs of external trauma to the back

## 2019-06-17 NOTE — ED PROVIDER NOTE - CLINICAL SUMMARY MEDICAL DECISION MAKING FREE TEXT BOX
Code Trauma B activated on arrival.  Seen and eval by Trauma Surgery and Neurosurgery PA and pt to be admitted to SICU for neuro checks and monitoring/further treatment of coags

## 2019-06-17 NOTE — PROGRESS NOTE ADULT - ASSESSMENT
79 y/o M with a PMHx of pulmonary hypertension, severe TR (on STANLEY 06/14/19), large PFO, Afib on coumadin, Pulmonary embolism (CTA 05/30/19), PNA in 2008 complicated by septic multisystem organ failure requiring HD, resolved, Hemochromatosis, HTN, HLD, and who was [place] [facility] [place] with questionable syncope with right SAH. Patient states that he was drinking beers and a double screwdriver yesterday, and thinks he slipped and fell on the floor. INR 5.1 at [facility] [place].  CT head done in [facility] [place] [facility] revealed right SAH, patient GCS 15 upon arrival. Admitted to SICU.    PLAN:  NEURO:   -Rpt CT head stable  -cont neuro checks  -Hold AC for now  -Will discuss w/ Dr. Cummings when Coumadin can be restarted        Assessment:  Please Check When Present   []  GCS  E   V  M     Heart Failure: []Acute, [] acute on chronic , []chronic  Heart Failure:  [] Diastolic (HFpEF), [] Systolic (HFrEF), []Combined (HFpEF and HFrEF), [] RHF, [] Pulm HTN, [] Other    [] ADI, [] ATN, [] AIN, [] other  [] CKD1, [] CKD2, [] CKD 3, [] CKD 4, [] CKD 5, []ESRD    Encephalopathy: [] Metabolic, [] Hepatic, [] toxic, [] Neurological, [] Other    Abnormal Nurtitional Status: [] malnurtition (see nutrition note), [ ]underweight: BMI < 19, [] morbid obesity: BMI >40, [] Cachexia    [] Sepsis  [] hypovolemic shock,[] cardiogenic shock, [] hemorrhagic shock, [] neuogenic shock  [] Acute Respiratory Failure  []Cerebral edema, [] Brain compression/ herniation,   [] Functional quadriplegia  [] Acute blood loss anemia    Will discuss plan with Dr. Cummings 77 y/o M with a PMHx of pulmonary hypertension, severe TR (on STANLEY 06/14/19), large PFO, Afib on coumadin, Pulmonary embolism (CTA 05/30/19), PNA in 2008 complicated by septic multisystem organ failure requiring HD, resolved, Hemochromatosis, HTN, HLD, and who was transferred from [facility] after fall with questionable syncope with right SAH. Patient states that he was drinking beers and a double screwdriver yesterday, and thinks he slipped and fell on the floor. INR 5.1 at [facility].  CT head done in [facility] revealed right SAH, patient GCS 15 upon arrival. Admitted to SICU.    PLAN:  NEURO:   -Rpt CT head stable  -cont neuro checks  -Hold AC for now  -Will discuss w/ Dr. Cummings when Coumadin can be restarted  -Cardiology f/up appreciated, ?rpt CTA chest        Assessment:  Please Check When Present   []  GCS  E   V  M     Heart Failure: []Acute, [] acute on chronic , []chronic  Heart Failure:  [] Diastolic (HFpEF), [] Systolic (HFrEF), []Combined (HFpEF and HFrEF), [] RHF, [] Pulm HTN, [] Other    [] ADI, [] ATN, [] AIN, [] other  [] CKD1, [] CKD2, [] CKD 3, [] CKD 4, [] CKD 5, []ESRD    Encephalopathy: [] Metabolic, [] Hepatic, [] toxic, [] Neurological, [] Other    Abnormal Nurtitional Status: [] malnurtition (see nutrition note), [ ]underweight: BMI < 19, [] morbid obesity: BMI >40, [] Cachexia    [] Sepsis  [] hypovolemic shock,[] cardiogenic shock, [] hemorrhagic shock, [] neuogenic shock  [] Acute Respiratory Failure  []Cerebral edema, [] Brain compression/ herniation,   [] Functional quadriplegia  [] Acute blood loss anemia    Will discuss plan with Dr. Cummings

## 2019-06-17 NOTE — PROGRESS NOTE ADULT - SUBJECTIVE AND OBJECTIVE BOX
SUBJECTIVE: Patient seen and examined at bedside.  Complains of mild headache.     CHIEF COMPLAINT: s/p fall    OVERNIGHT EVENTS: none    Vital Signs Last 24 Hrs  T(C): 36.7 (17 Jun 2019 12:00), Max: 36.8 (17 Jun 2019 01:00)  T(F): 98.1 (17 Jun 2019 12:00), Max: 98.3 (17 Jun 2019 01:00)  HR: 60 (17 Jun 2019 12:00) (45 - 74)  BP: 91/51 (17 Jun 2019 12:00) (91/51 - 117/65)  BP(mean): 66 (17 Jun 2019 12:00) (66 - 87)  RR: 28 (17 Jun 2019 12:00) (14 - 28)  SpO2: 90% (17 Jun 2019 12:00) (90% - 98%)    PHYSICAL EXAM:    General: No Acute Distress     Neurological: Awake, alert, oriented to self, place and date.  Speech clear.  Follows commands, DILLON.  No drift    Pulmonary: Clear to Auscultation, No Rales, No Rhonchi, No Wheezes     Cardiovascular: S1, S2, Regular Rate and Rhythm     Gastrointestinal: Soft, Nontender, Nondistended     LABS:                        13.8   7.0   )-----------( 226      ( 17 Jun 2019 10:07 )             43.5    06-17    145  |  107  |  21.0<H>  ----------------------------<  137<H>  3.9   |  24.0  |  0.81    Ca    8.7      17 Jun 2019 10:07  Phos  3.1     06-17  Mg     1.9     06-17      TPro  6.8  /  Alb  3.8  /  TBili  0.9  /  DBili  x   /  AST  51<H>  /  ALT  32  /  AlkPhos  92  06-17      PT/INR - ( 17 Jun 2019 00:19 )   PT: 14.2 sec;   INR: 1.23 ratio         PTT - ( 17 Jun 2019 00:19 )  PTT:29.6 sec      06-16 @ 07:01  -  06-17 @ 07:00  --------------------------------------------------------  IN: 875 mL / OUT: 225 mL / NET: 650 mL    06-17 @ 07:01  -  06-17 @ 12:58  --------------------------------------------------------  IN: 465 mL / OUT: 0 mL / NET: 465 mL    MEDICATIONS:  Antibiotics:    Neuro:  acetaminophen   Tablet .. 650 milliGRAM(s) Oral every 6 hours  citalopram 20 milliGRAM(s) Oral daily  ondansetron Injectable 4 milliGRAM(s) IV Push every 6 hours PRN Nausea    Cardiac:  metoprolol tartrate 12.5 milliGRAM(s) Oral daily    Pulm:    GI/:  docusate sodium 100 milliGRAM(s) Oral every 8 hours  pantoprazole  Injectable 40 milliGRAM(s) IV Push daily  senna 2 Tablet(s) Oral at bedtime    Other:   atorvastatin 10 milliGRAM(s) Oral at bedtime  chlorhexidine 2% Cloths 1 Application(s) Topical <User Schedule>  sodium chloride 0.9%. 1000 milliLiter(s) IV Continuous <Continuous>    DIET: [] Regular [] CCD [] Renal [] Puree [] Dysphagia [] Tube Feeds:     IMAGING: < from: CT Head No Cont (06.17.19 @ 06:02) >  Linear hyperdensity in the cortex over the right convexity likely   consistent with calcification. Tiny area of subarachnoid hemorrhage is   considered unlikely. Comparison to previous examinations would be   helpful. Generalized atrophy and chronic ischemic changes.  No definite   acute intracranial process   or intracranial hemorrhage.    < end of copied text >  < from: Xray Chest 1 View- PORTABLE-Urgent (06.17.19 @ 00:51) >  Right upper lobe infiltrate/scarring. Tiny right lower lobe   effusion/pleural thickening.    < end of copied text >    6/16 CT CS [facility]: no acute fx, sublexation or evidence of traumatic malalignment. Cervical spondylosis w severe canal stenosis at c3/4  and c4/5.  6/16 CTH [facility]: tiny amount of high medial parietal SAH. No IPH or calvarial Fx, chronic findings.

## 2019-06-17 NOTE — ED PROVIDER NOTE - OBJECTIVE STATEMENT
77 y/o M pt with significant PMHx of Chronic atrial fibrillation, Essential hypertension, ETOH abuse, Hemochromatosis, Hyperlipidemia, Non-rheumatic tricuspid valve insufficiency, Nonrheumatic aortic valve insufficiency, Pneumonia, Pulmonary hypertension and Sepsis presents to the ED flown in from [facility] c/o fall that occurred today around 7:30. The fall was witnessed by his wife. Pt fell from standing height. He hit the back of his head on the floor. Pt was transferred from [place] after a diagnosis of Subarachnoid Hemorrhage. He will be monitored by Neurology. Pt takes Coumadin daily. He was given K-Centra and Vitamin K on route to the hospital. Denies fevers, chills, HA, LOC, focal neuro deficits, CP/SOB/palpitations, cough, abd pain, n/v/d, back pain, rash, urinary f/u/d, recent travel and sick contacts. No other complaints at this time. 79 y/o M pt with significant PMHx of Chronic atrial fibrillation, Essential hypertension, ETOH abuse, Hemochromatosis, Hyperlipidemia, Non-rheumatic tricuspid valve insufficiency, Nonrheumatic aortic valve insufficiency, Pneumonia, Pulmonary hypertension and Sepsis presents to the ED flown in from [facility] c/o fall that occurred today around 7:30. The fall was witnessed by his wife. Pt fell from standing height. He hit the back of his head on the floor. Pt was transferred from [place] after a diagnosis of Subarachnoid Hemorrhage. He will be monitored by Neurology. Pt takes Coumadin daily. He was given K-Centra and Vitamin K on route to the hospital. Denies fevers, chills, HA, LOC, visual changes, focal neuro deficits, CP/SOB/palpitations, cough, abd pain, n/v/d, back pain, rash, urinary f/u/d, recent travel and sick contacts. No other complaints at this time.  CODE Trauma B initiated in the ED

## 2019-06-17 NOTE — ED PROVIDER NOTE - ENMT, MLM
Airway patent, Nasal mucosa clear. Mouth with normal mucosa. Throat has no vesicles, no oropharyngeal exudates and uvula is midline. No scalp tenderness.

## 2019-06-17 NOTE — H&P ADULT - ATTENDING COMMENTS
s/p mechanical fall, transfer from [facility]  primary intact gcs 15  secondary intact  labs / imaging reviewed  plan Right SAH  admit to sicu  NS consult, q1 neuro checks  b/l le u/s to r/o dvt  repeat ct head in 6 hours

## 2019-06-17 NOTE — ED PROVIDER NOTE - CARE PLAN
Principal Discharge DX:	SAH (subarachnoid hemorrhage)  Secondary Diagnosis:	Fall from standing, initial encounter

## 2019-06-17 NOTE — ED PROVIDER NOTE - CHPI ED SYMPTOMS NEG
no fever chills, HA, LOC, focal neuro deficits, CP/SOB/palpitations, cough, abd pain, n/v/d, back pain, rash, or urinary f/u/d/no fever

## 2019-06-17 NOTE — H&P ADULT - ASSESSMENT
A/P:    78 year old male s/p mechanical fall on coumadin seen to have a right SAH, GCS 15 admitted to SICU for close monitoring.    -Admit to SICU  -q1 neuro checks  -ABG  -review external radiological imagining  -   -pain management  -Neurosurgery consult  -tertiary exam

## 2019-06-18 LAB
ANION GAP SERPL CALC-SCNC: 11 MMOL/L — SIGNIFICANT CHANGE UP (ref 5–17)
BASOPHILS # BLD AUTO: 0 K/UL — SIGNIFICANT CHANGE UP (ref 0–0.2)
BASOPHILS NFR BLD AUTO: 0.2 % — SIGNIFICANT CHANGE UP (ref 0–2)
BUN SERPL-MCNC: 15 MG/DL — SIGNIFICANT CHANGE UP (ref 8–20)
CALCIUM SERPL-MCNC: 8.2 MG/DL — LOW (ref 8.6–10.2)
CHLORIDE SERPL-SCNC: 106 MMOL/L — SIGNIFICANT CHANGE UP (ref 98–107)
CO2 SERPL-SCNC: 25 MMOL/L — SIGNIFICANT CHANGE UP (ref 22–29)
CREAT SERPL-MCNC: 0.65 MG/DL — SIGNIFICANT CHANGE UP (ref 0.5–1.3)
EOSINOPHIL # BLD AUTO: 0.1 K/UL — SIGNIFICANT CHANGE UP (ref 0–0.5)
EOSINOPHIL NFR BLD AUTO: 2.2 % — SIGNIFICANT CHANGE UP (ref 0–5)
GLUCOSE BLDC GLUCOMTR-MCNC: 92 MG/DL — SIGNIFICANT CHANGE UP (ref 70–99)
GLUCOSE SERPL-MCNC: 93 MG/DL — SIGNIFICANT CHANGE UP (ref 70–115)
HCT VFR BLD CALC: 39.4 % — LOW (ref 42–52)
HGB BLD-MCNC: 12.6 G/DL — LOW (ref 14–18)
LYMPHOCYTES # BLD AUTO: 0.9 K/UL — LOW (ref 1–4.8)
LYMPHOCYTES # BLD AUTO: 14.5 % — LOW (ref 20–55)
MAGNESIUM SERPL-MCNC: 1.8 MG/DL — SIGNIFICANT CHANGE UP (ref 1.6–2.6)
MCHC RBC-ENTMCNC: 29.1 PG — SIGNIFICANT CHANGE UP (ref 27–31)
MCHC RBC-ENTMCNC: 32 G/DL — SIGNIFICANT CHANGE UP (ref 32–36)
MCV RBC AUTO: 91 FL — SIGNIFICANT CHANGE UP (ref 80–94)
MONOCYTES # BLD AUTO: 0.8 K/UL — SIGNIFICANT CHANGE UP (ref 0–0.8)
MONOCYTES NFR BLD AUTO: 12.8 % — HIGH (ref 3–10)
NEUTROPHILS # BLD AUTO: 4.5 K/UL — SIGNIFICANT CHANGE UP (ref 1.8–8)
NEUTROPHILS NFR BLD AUTO: 70.1 % — SIGNIFICANT CHANGE UP (ref 37–73)
PHOSPHATE SERPL-MCNC: 2.9 MG/DL — SIGNIFICANT CHANGE UP (ref 2.4–4.7)
PLATELET # BLD AUTO: 202 K/UL — SIGNIFICANT CHANGE UP (ref 150–400)
POTASSIUM SERPL-MCNC: 3.4 MMOL/L — LOW (ref 3.5–5.3)
POTASSIUM SERPL-SCNC: 3.4 MMOL/L — LOW (ref 3.5–5.3)
RBC # BLD: 4.33 M/UL — LOW (ref 4.6–6.2)
RBC # FLD: 17.2 % — HIGH (ref 11–15.6)
SODIUM SERPL-SCNC: 142 MMOL/L — SIGNIFICANT CHANGE UP (ref 135–145)
WBC # BLD: 6.4 K/UL — SIGNIFICANT CHANGE UP (ref 4.8–10.8)
WBC # FLD AUTO: 6.4 K/UL — SIGNIFICANT CHANGE UP (ref 4.8–10.8)

## 2019-06-18 PROCEDURE — 99232 SBSQ HOSP IP/OBS MODERATE 35: CPT

## 2019-06-18 PROCEDURE — 99231 SBSQ HOSP IP/OBS SF/LOW 25: CPT

## 2019-06-18 PROCEDURE — 99233 SBSQ HOSP IP/OBS HIGH 50: CPT

## 2019-06-18 PROCEDURE — 76705 ECHO EXAM OF ABDOMEN: CPT | Mod: 26

## 2019-06-18 PROCEDURE — 71275 CT ANGIOGRAPHY CHEST: CPT | Mod: 26

## 2019-06-18 PROCEDURE — 93306 TTE W/DOPPLER COMPLETE: CPT | Mod: 26

## 2019-06-18 PROCEDURE — 70551 MRI BRAIN STEM W/O DYE: CPT | Mod: 26

## 2019-06-18 RX ORDER — MAGNESIUM SULFATE 500 MG/ML
2 VIAL (ML) INJECTION ONCE
Refills: 0 | Status: COMPLETED | OUTPATIENT
Start: 2019-06-18 | End: 2019-06-18

## 2019-06-18 RX ORDER — THIAMINE MONONITRATE (VIT B1) 100 MG
100 TABLET ORAL DAILY
Refills: 0 | Status: DISCONTINUED | OUTPATIENT
Start: 2019-06-18 | End: 2019-06-21

## 2019-06-18 RX ORDER — POTASSIUM CHLORIDE 20 MEQ
40 PACKET (EA) ORAL ONCE
Refills: 0 | Status: COMPLETED | OUTPATIENT
Start: 2019-06-18 | End: 2019-06-18

## 2019-06-18 RX ORDER — MAGNESIUM SULFATE 500 MG/ML
1 VIAL (ML) INJECTION ONCE
Refills: 0 | Status: COMPLETED | OUTPATIENT
Start: 2019-06-18 | End: 2019-06-18

## 2019-06-18 RX ORDER — METOPROLOL TARTRATE 50 MG
25 TABLET ORAL DAILY
Refills: 0 | Status: DISCONTINUED | OUTPATIENT
Start: 2019-06-18 | End: 2019-06-21

## 2019-06-18 RX ORDER — FOLIC ACID 0.8 MG
1 TABLET ORAL DAILY
Refills: 0 | Status: DISCONTINUED | OUTPATIENT
Start: 2019-06-18 | End: 2019-06-21

## 2019-06-18 RX ADMIN — Medication 100 MILLIGRAM(S): at 06:21

## 2019-06-18 RX ADMIN — Medication 40 MILLIEQUIVALENT(S): at 06:53

## 2019-06-18 RX ADMIN — Medication 650 MILLIGRAM(S): at 18:56

## 2019-06-18 RX ADMIN — SENNA PLUS 2 TABLET(S): 8.6 TABLET ORAL at 20:43

## 2019-06-18 RX ADMIN — Medication 650 MILLIGRAM(S): at 10:51

## 2019-06-18 RX ADMIN — Medication 650 MILLIGRAM(S): at 01:00

## 2019-06-18 RX ADMIN — Medication 650 MILLIGRAM(S): at 19:50

## 2019-06-18 RX ADMIN — Medication 100 GRAM(S): at 18:57

## 2019-06-18 RX ADMIN — SODIUM CHLORIDE 75 MILLILITER(S): 9 INJECTION, SOLUTION INTRAVENOUS at 10:51

## 2019-06-18 RX ADMIN — CHLORHEXIDINE GLUCONATE 1 APPLICATION(S): 213 SOLUTION TOPICAL at 10:51

## 2019-06-18 RX ADMIN — Medication 50 GRAM(S): at 06:52

## 2019-06-18 RX ADMIN — PANTOPRAZOLE SODIUM 40 MILLIGRAM(S): 20 TABLET, DELAYED RELEASE ORAL at 10:51

## 2019-06-18 RX ADMIN — Medication 25 MILLIGRAM(S): at 18:57

## 2019-06-18 RX ADMIN — Medication 650 MILLIGRAM(S): at 06:59

## 2019-06-18 RX ADMIN — Medication 650 MILLIGRAM(S): at 06:21

## 2019-06-18 RX ADMIN — ATORVASTATIN CALCIUM 10 MILLIGRAM(S): 80 TABLET, FILM COATED ORAL at 20:44

## 2019-06-18 RX ADMIN — Medication 100 MILLIGRAM(S): at 20:43

## 2019-06-18 RX ADMIN — CITALOPRAM 20 MILLIGRAM(S): 10 TABLET, FILM COATED ORAL at 10:51

## 2019-06-18 RX ADMIN — Medication 100 MILLIGRAM(S): at 10:52

## 2019-06-18 RX ADMIN — Medication 12.5 MILLIGRAM(S): at 06:21

## 2019-06-18 RX ADMIN — Medication 3 MILLIGRAM(S): at 20:43

## 2019-06-18 NOTE — PROGRESS NOTE ADULT - ASSESSMENT
A/P:  79 y/o M with a PMHx of pulmonary hypertension, severe TR (on STANLEY 06/14/19), large PFO, Afib on coumadin, Pulmonary embolism (CTA 05/30/19), PNA in 2008 complicated by septic multisystem organ failure requiring HD, resolved, Hemochromatosis, HTN, HLD, and who was [place] [facility] [place] with questionable syncope with right SAH. Patient states that he was drinking beers and a double screwdriver yesterday, and thinks he slipped and fell on the floor. Unclear if patient had a syncopal event or not, patient doesn't think he passed out, but can't be completely sure. Patient states that he has been experiencing dyspnea on exertion for a long time, being worked up by Pulm, CT surgery, and Cardiology. Patient  is currently resting comfortably, denies SOB at rest. Patient hasn't followed up with his Cardiologist since his recent STANLEY. Patient denies any fevers, chills, CP, LE swelling, headache, dizziness, abdominal pain, N/V/D, vision changes, unilateral weakness.     1. Severe Pulmonary HTN and RV failure  - PAP 55/20 and PCWP 8 mmHG on cardiac cath 10/2018  - STANLEY on 06/14/19 showed EF 50-55%, severe pulmonary HTN, severely reduced RV fxn, severe TR, and + PFO  - Repeat CTA chest to r/o new PE  - Plan for RHC tomorrow, added on  - Plan to start adempas 0.5mg TID after RHC tomorrow.       2. NSVT  - Secondary to severe right heart failure   - D/C metoprolol tartrate, trial Toprol XL 25mg qday   - Continue telemetry monitoring  - Keep K>4, Mg>2    3. Pulmonary Embolism  - RUL and RLL segmental non-obstructing PE noted on CTA 05/30/19  - Recommend CTA chest today to r/o new acute Pulmonary embolism in setting of NSVT and ?syncope   - CT Surgery following  - Plan to start therapeutic lovenox of MRI head is normal and cleared by Neurosurgery    4. Afib  - Currently in SB   - Toprol XL 25 daily  - Lovenox as above    5. Hemochromatosis  - Heme consult

## 2019-06-18 NOTE — PROGRESS NOTE ADULT - ASSESSMENT
79 y/o M with a PMHx of pulmonary hypertension, severe TR (on STANLEY 06/14/19), large PFO, Afib on coumadin, Pulmonary embolism (CTA 05/30/19), PNA in 2008 complicated by septic multisystem organ failure requiring HD, resolved, Hemochromatosis, HTN, HLD, and who was transferred from [facility] after fall with questionable syncope with right SAH. Patient states that he was drinking beers and a double screwdriver yesterday, and thinks he slipped and fell on the floor. Unclear if patient had a syncopal event or not, patient doesn't think he passed out, but can't be completely sure. Patient states that he has been experiencing dyspnea on exertion for a long time, being worked up by Pulm, CT surgery, and Cardiology. Patient  is currently resting comfortably, denies SOB at rest. Patient hasn't followed up with his Cardiologist since his recent STANLEY. Patient denies any fevers, chills, CP, LE swelling, headache, dizziness, abdominal pain, N/V/D, vision changes, unilateral weakness.       ?Small SAH-  Repeat CT head on arrival read as likely calcification.  MRI brain today  Q4 neuro checks  Neurosx following     Severe Pulmonary HTN and RV failure likely from chronic PE  - PAP 55/20 and PCWP 8 mmHG on cardiac cath 10/2018  - STANLEY on 06/14/19 showed EF 50-55%, severe pulmonary HTN, severely reduced RV fxn, severe TR, and + PFO  - Repeat CTA chest to r/o new PE  - RHC in October of last year.  - Plan for RHC tomorrow per cardio  - Plan to start adempas 0.5mg TID after RHC tomorrow.   Cardio appreciated      2. NSVT  - Secondary to severe right heart failure   - D/C metoprolol tartrate, trial Toprol XL 25mg qday   - Continue telemetry monitoring  - Keep K>4, Mg>2  Cardio appreciated  maintain tele    3. Pulmonary Embolism  - RUL and RLL segmental non-obstructing PE noted on CTA 05/30/19  - CTA chest today to r/o new acute Pulmonary embolism in setting of NSVT and ?syncope   - CT Surgery following  - Plan to start therapeutic lovenox of MRI head is normal and cleared by Neurosurgery    4. Afib  - Currently in SB   - Toprol XL 25 daily  - Plan to start therapeutic lovenox of MRI head is normal and cleared by Neurosurgery    5. Hemochromatosis  - Heme consult: no phlebotomy needed at this time. will resume phlebotomies as outpatient  Abd US      Severe TR-  -was being worked up as an outpatient for sx   TTE: EF 40 to 45%.Grade I diastolic dysfunction. Severely enlarged right ventricle. Severely reduced RV systolic function.Moderate-severe tricuspid regurgitation. Moderate aortic regurgitation. Moderate to severe pulmonic valve regurgitation.   severe pulmonary hypertension.  -would await MRI and clearance from neuro prior to any surgical interventions   CTS following      ETOH abuse-  US with hepatomegaly & mild nodularity likely form hemochromatosis, no lab evidence of cirrhosis  hep panel  CIWA protocol  Thiamine folate  SW consult      DVT: Lovenox after MRI -ve

## 2019-06-18 NOTE — PROGRESS NOTE ADULT - SUBJECTIVE AND OBJECTIVE BOX
ACCEPTANCE NOTE:  Pt downgraded from SICU to tele	    CHIEF COMPLAINT/INTERVAL HISTORY:    Patient is a 78y old  Male who presents with a chief complaint of hemochromatosis, PE (2019 09:40)      HPI:  78 year old male transferred from [facility], admitted for witnessed mechanical fall while on Coumadin for Afib. CT head done in [facility] revealed right SAH, patient GCS 15 upon arrival. Admitted to SICU.      SUBJECTIVE & OBJECTIVE/ ROS: Pt seen and examined at bedside. c/o dyspnea on exertion. No chest pain, palpitations, light headedness/dizziness, cough, fevers/chills, abdominal pain, n/v, diarrhea/constipation, dysuria or increased urinary frequency.     ICU Vital Signs Last 24 Hrs  T(C): 36.7 (2019 12:45), Max: 36.8 (2019 16:00)  T(F): 98 (2019 12:45), Max: 98.3 (2019 16:00)  HR: 67 (2019 12:45) (50 - 81)  BP: 118/76 (2019 12:45) (85/54 - 127/76)  BP(mean): 96 (2019 12:00) (65 - 96)  ABP: --  ABP(mean): --  RR: 20 (2019 12:45) (12 - 46)  SpO2: 94% (2019 12:45) (90% - 97%)        MEDICATIONS  (STANDING):  acetaminophen   Tablet .. 650 milliGRAM(s) Oral every 6 hours  atorvastatin 10 milliGRAM(s) Oral at bedtime  chlorhexidine 2% Cloths 1 Application(s) Topical <User Schedule>  citalopram 20 milliGRAM(s) Oral daily  docusate sodium 100 milliGRAM(s) Oral every 8 hours  melatonin 3 milliGRAM(s) Oral at bedtime  metoprolol succinate ER 25 milliGRAM(s) Oral daily  pantoprazole  Injectable 40 milliGRAM(s) IV Push daily  senna 2 Tablet(s) Oral at bedtime    MEDICATIONS  (PRN):  ondansetron Injectable 4 milliGRAM(s) IV Push every 6 hours PRN Nausea      LABS:                        12.6   6.4   )-----------( 202      ( 2019 05:36 )             39.4     06-18    142  |  106  |  15.0  ----------------------------<  93  3.4<L>   |  25.0  |  0.65    Ca    8.2<L>      2019 05:36  Phos  2.9       Mg     1.8         TPro  6.8  /  Alb  3.8  /  TBili  0.9  /  DBili  x   /  AST  51<H>  /  ALT  32  /  AlkPhos  92      PT/INR - ( 2019 00:19 )   PT: 14.2 sec;   INR: 1.23 ratio         PTT - ( 2019 00:19 )  PTT:29.6 sec  Urinalysis Basic - ( 2019 05:33 )    Color: Yellow / Appearance: Clear / S.020 / pH: x  Gluc: x / Ketone: Negative  / Bili: Negative / Urobili: 4 mg/dL   Blood: x / Protein: 30 mg/dL / Nitrite: Negative   Leuk Esterase: Trace / RBC: 0-2 /HPF / WBC 0-2   Sq Epi: x / Non Sq Epi: Occasional / Bacteria: Occasional        CAPILLARY BLOOD GLUCOSE      POCT Blood Glucose.: 92 mg/dL (2019 23:59)  POCT Blood Glucose.: 98 mg/dL (2019 17:10)      RECENT CULTURES:      RADIOLOGY & ADDITIONAL TESTS:      PHYSICAL EXAM:    GENERAL: NAD, well-groomed, well-developed  HEAD:  Atraumatic, Normocephalic  EYES: EOMI, PERRLA, conjunctiva and sclera clear  ENMT: Moist mucous membranes  NECK: Supple, No JVD  NERVOUS SYSTEM:  Alert & Oriented X3, Motor Strength 5/5 B/L upper and lower extremities; DTRs 2+ intact and symmetric  CHEST/LUNG: Clear to auscultation bilaterally; No rales, rhonchi, wheezing, or rubs  HEART: Regular rate and rhythm; No murmurs, rubs, or gallops  ABDOMEN: Soft, Nontender, Nondistended; Bowel sounds present  EXTREMITIES:  2+ Peripheral Pulses, No clubbing, cyanosis, or edema

## 2019-06-18 NOTE — PROGRESS NOTE ADULT - ASSESSMENT
78y Male PMH afib on Coumadin, severe tricuspid regurgitation, large PFO, PE 5/30/19, PNA in 2008 complicated by sepitc multisystem organ failure requiring HD- resolved, hemochromatosis, HTN, HLD, tx from [facility] s/p questionable syncope and fall found with possible R SAH. Repeat CT head on arrival read as likely calcification.  - GCS 15, no focal neurologic deficit    PLAN:  - Will d/w attending  - Ok for Q4 neuro checks, downgrade to floor from neurosurgical perspective  - MRI brain without contrast pending to assess ICH vs calcification  - Cardiology following  - CT surgery following- TTE repeated; pulmonary consult pending  - Ok for chemical DVT ppx  - Recommendations on restarting therapeutic anticoagulation pending MRI results  - Supportive care/further medical management per primary team 78y Male PMH afib on Coumadin, severe tricuspid regurgitation, large PFO, PE 5/30/19, PNA in 2008 complicated by sepitc multisystem organ failure requiring HD- resolved, hemochromatosis, HTN, HLD, tx from [facility] s/p questionable syncope and fall found with possible R SAH. Repeat CT head on arrival read as likely calcification.  - GCS 15, no focal neurologic deficit    PLAN:  - Will d/w attending  - Ok for Q4 neuro checks, downgrade to floor from neurosurgical perspective  - MRI brain without contrast pending to assess ICH vs calcification  - Hematology following  - Cardiology following  - CT surgery following- TTE repeated; pulmonary consult pending  - Ok for chemical DVT ppx  - Recommendations on restarting therapeutic anticoagulation pending MRI results  - Supportive care/further medical management per primary team

## 2019-06-18 NOTE — PROGRESS NOTE ADULT - ATTENDING COMMENTS
NSGY Attg:    see above    patient seen and examined    exam nonfocal    MRI brain w and wo contrast pending

## 2019-06-18 NOTE — PROGRESS NOTE ADULT - SUBJECTIVE AND OBJECTIVE BOX
INTERVAL HPI/OVERNIGHT EVENTS:  78y Male PMH afib on Coumadin, severe tricuspid regurgitation, large PFO, PE 19, PNA in  complicated by sepitc multisystem organ failure requiring HD- resolved, hemochromatosis, HTN, HLD, tx from [facility] s/p questionable syncope and fall found with possible R SAH. Repeat CT head on arrival read as likely calcification. Patient seen earlier this AM, no complaints, asking when they are going to fix his valves. Denies headache, dizziness, weakness, paresthesias.    Vital Signs Last 24 Hrs  T(C): 36.5 (2019 08:00), Max: 36.8 (2019 16:00)  T(F): 97.7 (2019 08:00), Max: 98.3 (2019 16:00)  HR: 60 (2019 11:00) (48 - 81)  BP: 119/72 (2019 11:00) (85/54 - 119/77)  BP(mean): 91 (2019 11:00) (64 - 94)  RR: 21 (2019 11:00) (12 - 46)  SpO2: 95% (2019 11:00) (90% - 97%)    PHYSICAL EXAM:  GENERAL: NAD, well-groomed, well-developed  MAL COMA SCORE: E- 4 V- 5 M- 6=15  MENTAL STATUS: Awake, alert, oriented to self, SSH, full date. Appropriately conversant without aphasia; following commands  CRANIAL NERVES: PERRL. Face symmetric. Hearing grossly intact. Speech clear  MOTOR: strength 5/5 b/l upper and lower extremities  SENSATION: grossly intact to light touch all extremities  CHEST/LUNG: Resting comfortably in bed with nonlabored breathing, no respiratory distress   HEART: Regular rate    LABS:                        12.6   6.4   )-----------( 202      ( 2019 05:36 )             39.4     06-18    142  |  106  |  15.0  ----------------------------<  93  3.4<L>   |  25.0  |  0.65    Ca    8.2<L>      2019 05:36  Phos  2.9     06-18  Mg     1.8     06-18    TPro  6.8  /  Alb  3.8  /  TBili  0.9  /  DBili  x   /  AST  51<H>  /  ALT  32  /  AlkPhos  92  -    PT/INR - ( 2019 00:19 )   PT: 14.2 sec;   INR: 1.23 ratio       PTT - ( 2019 00:19 )  PTT:29.6 sec  Urinalysis Basic - ( 2019 05:33 )    Color: Yellow / Appearance: Clear / S.020 / pH: x  Gluc: x / Ketone: Negative  / Bili: Negative / Urobili: 4 mg/dL   Blood: x / Protein: 30 mg/dL / Nitrite: Negative   Leuk Esterase: Trace / RBC: 0-2 /HPF / WBC 0-2   Sq Epi: x / Non Sq Epi: Occasional / Bacteria: Occasional     @ 07:01  -   @ 07:00  --------------------------------------------------------  IN: 2880 mL / OUT: 475 mL / NET: 2405 mL     @ 07:01   @ 11:59  --------------------------------------------------------  IN: 475 mL / OUT: 0 mL / NET: 475 mL    RADIOLOGY & ADDITIONAL TESTS:  US Duplex Venous Lower Ext Complete, Bilateral (19 @ 18:07)  IMPRESSION:   Unremarkable ultrasound of the bilateral lower extremity   deep venous system.        CT Head No Cont (19 @ 06:02)  IMPRESSION:   Linear hyperdensity in the cortex over the right convexity likely   consistent with calcification. Tiny area of subarachnoid hemorrhage is   considered unlikely. Comparison to previous examinations would be   helpful. Generalized atrophy and chronic ischemic changes.  No definite   acute intracranial process   or intracranial hemorrhage. Consider follow-up as clinically warranted.   Preliminary report provided by [facility] BELGICA VELAZQUEZ M.D.;[facility] RADIOLOGIST

## 2019-06-18 NOTE — PROGRESS NOTE ADULT - ASSESSMENT
A/p: 78yM hx of pulmonary HTN, severe TR, large PFO, Pulmonary embolism and afib on coumadin, s/p fall found to have R SAH, neurologically intact A/p: 78yM hx of pulmonary HTN, severe TR, large PFO, Pulmonary embolism and afib on coumadin, s/p fall found to have R SAH, neurologically intact    Neuro: GCS 15.  CIWA has no required meds.  Cont neuro checks as per neurosurgery.  Delirium precautions. A/p: 78yM hx of pulmonary HTN, severe TR, large PFO, Pulmonary embolism and afib on coumadin, s/p fall found to have R SAH, neurologically intact    Neuro: GCS 15.  CIWA has no required meds.  Cont neuro checks as per neurosurgery.  Delirium precautions.      Pulm: RA. Pulmonary toileting oob to chair with assistance    Card: Cardiothoracic surg on board, plan for TTE today.  Cont hemodynamic monitoring.  Further work up for PE's to come up with anticoagulation plan    GI: Abdominal ultrasound for liver function, NPO until test complete    : Voiding    Endo: None    ID: None    Hem/DVT: SCDs, No chemical ppx at this time    Tubes/Lines: PIV    Skin: Frequent turning, oob to chair w/ assistance    Dispo: SICU, further caro for CTS

## 2019-06-18 NOTE — PROGRESS NOTE ADULT - ATTENDING COMMENTS
Seen and examined.    NAD  AAOx4  Non labored resp.    No clinical neurologic change.  Stable CT.  Unclear if traumatic SAH or calcification.  MRI planned to know for sure to determine anticoagulation timing.  OK for transfer to floor.  Given ongoing eval and management for chronic RV failure, PTHN will transition to medicine service.  PT/OT/PMR.

## 2019-06-18 NOTE — CONSULT NOTE ADULT - ASSESSMENT
77 y/o M with a PMHx of pulmonary hypertension, severe TR (on STANLEY 06/14/19), large PFO, Afib on coumadin, Pulmonary embolism (CTA 05/30/19), PNA in 2008 complicated by septic multisystem organ failure requiring HD, resolved, Hemochromatosis, HTN, HLD, and who was transferred from [facility] after fall with questionable syncope with right SAH.   As per report - Patient states that he was drinking beers and a double screwdriver yesterday, and thinks he slipped and fell on the floor. Unclear if patient had a syncopal event or not, patient doesn't think he passed out, but can't be completely sure. Patient states that he has been experiencing dyspnea on exertion for a long time, being worked up by Pulm, and Cardiology.     Severe TR   -was being worked up as an outpatient for sx   -tte to eval severity   -would await MRI and clearance from neuro prior to any surgical interventions     ETOH abuse  -r/o cirrhosis   -recc abdominal ultrasound   -low threshold for hep panel thereafter     Severe Pulmonary HTN and RV failure  - WHO class 4 as per cardiology   - PAP 55/20 and PCWP 8 mmHG on cardiac cath 10/2018  - STANLEY on 06/14/19 showed EF 50-55%, severe pulmonary HTN, severely reduced RV fxn, severe TR, and + PFO  - Recommend Pulm evaluation  - TTE repeat     Pulmonary Embolism  - anticoagulation as per cardiology when cleared by neuro   - PE with non surgical management at this time     Afib  - Currently in SB   - Toprol XL 25 daily  - Restart coumadin as above    E/M TIME SPENT:   30 minutes of noncontinuous time spent   Evaluating/treating patient, reviewing data/labs/imaging, discussing case with multidisciplinary team, discussing plan/goals of care with patient/family about patient's condition . Non-inclusive of procedure time.   greater than 50% of time spent educating patient about condition, medication and prognosis.

## 2019-06-18 NOTE — PROGRESS NOTE ADULT - SUBJECTIVE AND OBJECTIVE BOX
HPI:  The patient is a 78 year old male being followed by Dr. Miller at [facility] for hemochromatosis. The patient had a liver MRI in August 2018 and was was found to have radiographic evidence of hepatic iron overload. On that scan, there was not mention of cirrhosis or hepatoma. The patient has been on a phleb program every 3 months. His last ferritin was 53.     The patient has a hisotry of alchohol absuse and valvular insufficiency    The patient was transferred from [facility] [place], admitted for witnessed mechanical fall while on Coumadin for Afib and history of PE in 5/19. CT head done in [facility] revealed right SAH, patient GCS 15 upon arrival. Ct scan here suggests possible calcification. Brain MRI is being considered    PAST MEDICAL & SURGICAL HISTORY:  ETOH abuse  Nonrheumatic aortic valve insufficiency  Pneumonia  Sepsis  Hemochromatosis  Pulmonary hypertension  Chronic atrial fibrillation  Non-rheumatic tricuspid valve insufficiency  Hyperlipidemia, unspecified hyperlipidemia type  Essential hypertension  Family history of punctured lung  History of hammer toe correction  History of bunionectomy  Laceration of neck, complicated  History of shoulder surgery  History of total right hip replacement    FAMILY HISTORY:  Family history of diabetes mellitus  Family history of dementia  Family history of myocardial infarction      MEDICATIONS  (STANDING):  acetaminophen   Tablet .. 650 milliGRAM(s) Oral every 6 hours  atorvastatin 10 milliGRAM(s) Oral at bedtime  chlorhexidine 2% Cloths 1 Application(s) Topical <User Schedule>  citalopram 20 milliGRAM(s) Oral daily  docusate sodium 100 milliGRAM(s) Oral every 8 hours  melatonin 3 milliGRAM(s) Oral at bedtime  metoprolol tartrate 12.5 milliGRAM(s) Oral daily  multiple electrolytes Injection Type 1 1000 milliLiter(s) (75 mL/Hr) IV Continuous <Continuous>  pantoprazole  Injectable 40 milliGRAM(s) IV Push daily  senna 2 Tablet(s) Oral at bedtime    ICU Vital Signs Last 24 Hrs  T(C): 36.5 (18 Jun 2019 08:00), Max: 36.8 (17 Jun 2019 16:00)  T(F): 97.7 (18 Jun 2019 08:00), Max: 98.3 (17 Jun 2019 16:00)  HR: 62 (18 Jun 2019 09:00) (48 - 81)  BP: 106/66 (18 Jun 2019 09:00) (85/54 - 119/77)  BP(mean): 82 (18 Jun 2019 09:00) (64 - 94)  ABP: --  ABP(mean): --  RR: 18 (18 Jun 2019 09:00) (12 - 46)  SpO2: 91% (18 Jun 2019 09:00) (90% - 97%)  CONSTITUTIONAL: The patient appears well nourished and is in no apparent distress  EYES: EOMI, no scleral icterus, conjunctiva clear,  EARS, NOSE, MOUTH, THROAT:  oropharynx clear without erythema or ulceration  NECK: no thyromegaly, no cervical lymphadenopathy appreciated  PULMONARY: Clear to auscultation bilaterally, no wheezing or rhonchi, no use of accessory muscles  CARDIOVASCULAR:murmur,  no peripheral edema  GASTROINTESTINAL: soft, nontender, nondistended, normoactive bowel sounds, no hepatomegaly or splenomegaly is appreciated  MUSCULOSKELETAL: no spinal tenderness to palpation. no joint swelling.   EXTREMITIES: No digital cyanosis or clubbing  LYMPH NODES: no cervical, no supraclavicular, axillary or inguinal lymphadenopathy  SKIN: no rashes, lesions, ulcers or bruising  PSYCH: alert and oriented x 3, appropriate affect                12.6                 142  | 25.0 | 15.0         6.4   >-----------< 202     ------------------------< 93                    39.4                 3.4  | 106  | 0.65                                         Ca 8.2   Mg 1.8   Ph 2.9    PT/INR - ( 17 Jun 2019 00:19 )   PT: 14.2 sec;   INR: 1.23 ratio         PTT - ( 17 Jun 2019 00:19 )  PTT:29.6 sec

## 2019-06-18 NOTE — PROGRESS NOTE ADULT - ATTENDING COMMENTS
pt seen and examined. pt with pulmonary HTN.  CT chest from last month reviewed as well. Pt with PE and most likely chronic PE as a cause of PAH.  He has RHC in October of last year.  We will repeat CT chest today  He will have MRI of head to exclude SDH. If negative I asked jenna STOUT to start lovenox as a form of AC, 1mg /kg q12h  We will repeat RHC tomorrow. PVR of 6 Wood units as per cath  Pt also has a left to right shunt based on prior STANLEY.   Plan to start Adempas for pulmonary HTN.  I will follow with you

## 2019-06-18 NOTE — PROGRESS NOTE ADULT - SUBJECTIVE AND OBJECTIVE BOX
HISTORY  78y Male s/p fall with R SAH    24 HOUR EVENTS: Patient doing well overnight.  Denies pain. Slept well without acute complaints.  Lactate cleared.  VSS. Voiding without difficulty.  Tolerating a regular diet.  CIWA ordered has been 0 x 24hrs. Denies f/c/n/v.     SUBJECTIVE/ROS:  [x ] A ten-point review of systems was otherwise negative except as noted.  [ ] Due to altered mental status/intubation, subjective information were not able to be obtained from the patient. History was obtained, to the extent possible, from review of the chart and collateral sources of information.      NEURO  RASS: 0    GCS:  15   CAM ICU: neg  Exam: No focal neuro deficits, DILLON, 5/5 strength   Meds: acetaminophen   Tablet .. 650 milliGRAM(s) Oral every 6 hours  citalopram 20 milliGRAM(s) Oral daily  melatonin 3 milliGRAM(s) Oral at bedtime  ondansetron Injectable 4 milliGRAM(s) IV Push every 6 hours PRN Nausea    [x] Adequacy of sedation and pain control has been assessed and adjusted      RESPIRATORY  RR: 16 (06-18-19 @ 05:00) (12 - 46)  SpO2: 93% (06-18-19 @ 05:00) (90% - 98%)  Wt(kg): --  Exam: unlabored, clear to auscultation bilaterally  Mechanical Ventilation:   ABG - ( 17 Jun 2019 20:54 )  pH: x     /  pCO2: x     /  pO2: x     / HCO3: x     / Base Excess: x     /  SaO2: x       Lactate: 1.3              [ ] Extubation Readiness Assessed  Meds:       CARDIOVASCULAR  HR: 58 (06-18-19 @ 05:00) (45 - 77)  BP: 109/61 (06-18-19 @ 05:00) (85/54 - 119/77)  BP(mean): 79 (06-18-19 @ 05:00) (64 - 94)  ABP: --  ABP(mean): --  Wt(kg): --  CVP(cm H2O): --  VBG - ( 17 Jun 2019 00:22 )  pH: x     /  pCO2: x     /  pO2: x     / HCO3: x     / Base Excess: x     /  SaO2: x      Lactate: 3.0              Lactate, Blood: 1.3 mmol/L (06-17 @ 20:54)    Exam: NSR   Cardiac Rhythm: RRR  Perfusion     [ x]Adequate   [ ]Inadequate  Mentation   [x ]Normal       [ ]Reduced  Extremities  [ x]Warm         [ ]Cool  Volume Status [ ]Hypervolemic [x ]Euvolemic [ ]Hypovolemic  Meds: metoprolol tartrate 12.5 milliGRAM(s) Oral daily        GI/NUTRITION  Exam: soft, nttp, nondistended, no rebound or guarding  Diet: NPO   Meds: docusate sodium 100 milliGRAM(s) Oral every 8 hours  pantoprazole  Injectable 40 milliGRAM(s) IV Push daily  senna 2 Tablet(s) Oral at bedtime      GENITOURINARY  I&O's Detail    06-16 @ 07:01  -  06-17 @ 07:00  --------------------------------------------------------  IN:    sodium chloride 0.9%: 375 mL    Sodium Chloride 0.9% IV Bolus: 500 mL  Total IN: 875 mL    OUT:    Voided: 225 mL  Total OUT: 225 mL    Total NET: 650 mL      06-17 @ 07:01  -  06-18 @ 06:16  --------------------------------------------------------  IN:    multiple electrolytes Injection Type 1: 1550 mL    Oral Fluid: 880 mL    sodium chloride 0.9%: 225 mL  Total IN: 2655 mL    OUT:    Voided: 225 mL  Total OUT: 225 mL    Total NET: 2430 mL          06-18    142  |  106  |  15.0  ----------------------------<  93  3.4<L>   |  25.0  |  0.65    Ca    8.2<L>      18 Jun 2019 05:36  Phos  2.9     06-18  Mg     1.8     06-18    TPro  6.8  /  Alb  3.8  /  TBili  0.9  /  DBili  x   /  AST  51<H>  /  ALT  32  /  AlkPhos  92  06-17    [ ] Murphy catheter, indication: voiding   Meds: magnesium sulfate  IVPB 2 Gram(s) IV Intermittent once  multiple electrolytes Injection Type 1 1000 milliLiter(s) IV Continuous <Continuous>  potassium chloride   Powder 40 milliEquivalent(s) Oral once        HEMATOLOGIC  Meds:   [x] VTE Prophylaxis                        12.6   6.4   )-----------( 202      ( 18 Jun 2019 05:36 )             39.4     PT/INR - ( 17 Jun 2019 00:19 )   PT: 14.2 sec;   INR: 1.23 ratio         PTT - ( 17 Jun 2019 00:19 )  PTT:29.6 sec  Transfusion     [ ] PRBC   [ ] Platelets   [ ] FFP   [ ] Cryoprecipitate      INFECTIOUS DISEASES  T(C): 36.5 (06-18-19 @ 04:00), Max: 36.8 (06-17-19 @ 16:00)  Wt(kg): --  WBC Count: 6.4 K/uL (06-18 @ 05:36)  WBC Count: 7.0 K/uL (06-17 @ 10:07)    Recent Cultures:    Meds:       ENDOCRINE  Capillary Blood Glucose    Meds: atorvastatin 10 milliGRAM(s) Oral at bedtime    Vascular: 2+ peripheral pulses b/l     Skin: No skin breakdown     ACCESS DEVICES:  [ ] Peripheral IV  [ ] Central Venous Line	[ ] R	[ ] L	[ ] IJ	[ ] Fem	[ ] SC	Placed:   [ ] Arterial Line		[ ] R	[ ] L	[ ] Fem	[ ] Rad	[ ] Ax	Placed:   [ ] PICC:					[ ] Mediport  [ ] Urinary Catheter, Date Placed:   [ ] Necessity of urinary, arterial, and venous catheters discussed    OTHER MEDICATIONS:  chlorhexidine 2% Cloths 1 Application(s) Topical <User Schedule>      CODE STATUS:     IMAGING:

## 2019-06-18 NOTE — PROGRESS NOTE ADULT - SUBJECTIVE AND OBJECTIVE BOX
CHIEF COMPLAINT:Patient is a 78y old  Male who presents with a chief complaint of hemochromatosis, PE (18 Jun 2019 09:40)      INTERVAL HISTORY: Patient awaiting MRI today to evaluate if truly a SAH. Patient to be transferred out of SICU today. Patient states he is feeling ok today, still with some intermittent shortness of breath.       Allergies    No Known Allergies    Intolerances      	  MEDICATIONS:  metoprolol tartrate 12.5 milliGRAM(s) Oral daily        acetaminophen   Tablet .. 650 milliGRAM(s) Oral every 6 hours  citalopram 20 milliGRAM(s) Oral daily  melatonin 3 milliGRAM(s) Oral at bedtime  ondansetron Injectable 4 milliGRAM(s) IV Push every 6 hours PRN    docusate sodium 100 milliGRAM(s) Oral every 8 hours  pantoprazole  Injectable 40 milliGRAM(s) IV Push daily  senna 2 Tablet(s) Oral at bedtime    atorvastatin 10 milliGRAM(s) Oral at bedtime    chlorhexidine 2% Cloths 1 Application(s) Topical <User Schedule>  multiple electrolytes Injection Type 1 1000 milliLiter(s) IV Continuous <Continuous>        PHYSICAL EXAM:    T(C): 36.5 (06-18-19 @ 08:00), Max: 36.8 (06-17-19 @ 16:00)  HR: 60 (06-18-19 @ 11:00) (48 - 81)  BP: 119/72 (06-18-19 @ 11:00) (85/54 - 119/77)  RR: 21 (06-18-19 @ 11:00) (12 - 46)  SpO2: 95% (06-18-19 @ 11:00) (90% - 97%)  Wt(kg): --    I&O's Summary    17 Jun 2019 07:01  -  18 Jun 2019 07:00  --------------------------------------------------------  IN: 2880 mL / OUT: 475 mL / NET: 2405 mL    18 Jun 2019 07:01  -  18 Jun 2019 12:15  --------------------------------------------------------  IN: 475 mL / OUT: 0 mL / NET: 475 mL        Daily     Daily     Appearance: Normal	  HEENT:   NC/AT  Eye: Pink Conjunctiva  Lungs: CTA B/L  CVS: RRR, Normal S1 and S2, No Edema  Pulses: Normal distal pulses.  Neuro: A&O x3    TELEMETRY: SR/SB, frequent PVCs, NSVT   	  RADIOLOGY:   < from: US Duplex Venous Lower Ext Complete, Bilateral (06.17.19 @ 18:07) >   EXAM:  US DPLX LWR EXT VEINS COMPL BI                          PROCEDURE DATE:  06/17/2019          INTERPRETATION:  Ultrasound of the lower extremity deep venous system         CLINICAL INFORMATION:  Pulmonary embolus, evaluate for deep venous   thrombosis.    TECHNIQUE:   Duplex ultrasonography with color and spectral doppler of   the bilateral lower extremity deep venous system was performed.    FINDINGS:    No previous examinations are  available for review.    The bilateral lower extremity deep venous system demonstrated no   abnormality.  The veins were patent with intact Doppler flow.  The flow   varied with respiration and augmented with distal calf compression.  The   veins were directly compressible by the ultrasound transducer.     The veins evaluated included the common femoral vein, the inflow of the   greater saphenous vein, the inflow of the deep femoral vein, the   superficial femoral vein, the popliteal vein and posterior tibial veins.        IMPRESSION:   Unremarkable ultrasound of the bilateral lower extremity   deep venous system.          < end of copied text >        < from: CT Angio Chest w/ IV Cont (05.30.19 @ 08:57) >   EXAM:  CT ANGIO CHEST (W)AW IC                          PROCEDURE DATE:  05/30/2019          INTERPRETATION:  CTA chest .  COMPARISON: None.  CLINICAL INFORMATION: chest pain, dyspnea.  TECHNIQUE: Contiguous axial 1.25 mm slice thickness images ofthe chest   were obtained after intravenous contrast administration utilizing PE   protocol.  Maximum intensity projection,(MIP) 3-D,  imaging was created and   interpreted.  100 mls of Omnipaque 300 was administered intravenously without   complication and 0 mls were discarded.    FINDINGS:  Multiple RIGHT upper lobe and proximal RIGHT lower lobe proximal   segmental nonobstructing emboli noted. A LEFT pulmonary arteries patent..    The mediastinum great vessels are normal.     There are no mediastinal masses or lymphadenopathy.     The heart and pericardium are normal.      The airway is patent showing normal caliber and contour.    A RIGHT upper lobe apical area of reticular nodular fibrosis noted. There   is no pleural effusion or pneumothorax.    Visualized upper abdominal viscera unremarkable.    The bones are normal.    IMPRESSION:    A RIGHT upper lobe a RIGHT lower lobe proximal segmental nonobstructing   pulmonary emboli.    . Critical value  discussed with cardiac charge nurse Christiano, on   5/30/2019 at 8:40 AM with read back.  Hospital policies for critical values including read back   policy were followed.  The verbal communication of the critical value   supplements this written report.     < end of copied text >  LABS:	 	    CARDIAC MARKERS:                            12.6   6.4   )-----------( 202      ( 18 Jun 2019 05:36 )             39.4     06-18    142  |  106  |  15.0  ----------------------------<  93  3.4<L>   |  25.0  |  0.65    Ca    8.2<L>      18 Jun 2019 05:36  Phos  2.9     06-18  Mg     1.8     06-18    TPro  6.8  /  Alb  3.8  /  TBili  0.9  /  DBili  x   /  AST  51<H>  /  ALT  32  /  AlkPhos  92  06-17    proBNP:   Lipid Profile:   HgA1c:   TSH:     ASSESSMENT/PLAN: CHIEF COMPLAINT:Patient is a 78y old  Male who presents with a chief complaint of hemochromatosis, PE (18 Jun 2019 09:40)      INTERVAL HISTORY: Patient awaiting MRI today to evaluate if truly a SAH. Patient to be transferred out of SICU today. Patient states he is feeling ok today, still with some intermittent shortness of breath.       Allergies    No Known Allergies    Intolerances      	  MEDICATIONS:  metoprolol tartrate 12.5 milliGRAM(s) Oral daily  acetaminophen   Tablet .. 650 milliGRAM(s) Oral every 6 hours  citalopram 20 milliGRAM(s) Oral daily  melatonin 3 milliGRAM(s) Oral at bedtime  ondansetron Injectable 4 milliGRAM(s) IV Push every 6 hours PRN  docusate sodium 100 milliGRAM(s) Oral every 8 hours  pantoprazole  Injectable 40 milliGRAM(s) IV Push daily  senna 2 Tablet(s) Oral at bedtime  atorvastatin 10 milliGRAM(s) Oral at bedtime  chlorhexidine 2% Cloths 1 Application(s) Topical <User Schedule>  multiple electrolytes Injection Type 1 1000 milliLiter(s) IV Continuous <Continuous>    PHYSICAL EXAM:  T(C): 36.5 (06-18-19 @ 08:00), Max: 36.8 (06-17-19 @ 16:00)  HR: 60 (06-18-19 @ 11:00) (48 - 81)  BP: 119/72 (06-18-19 @ 11:00) (85/54 - 119/77)  RR: 21 (06-18-19 @ 11:00) (12 - 46)  SpO2: 95% (06-18-19 @ 11:00) (90% - 97%)  Wt(kg): --    I&O's Summary    17 Jun 2019 07:01  -  18 Jun 2019 07:00  --------------------------------------------------------  IN: 2880 mL / OUT: 475 mL / NET: 2405 mL    18 Jun 2019 07:01  -  18 Jun 2019 12:15  --------------------------------------------------------  IN: 475 mL / OUT: 0 mL / NET: 475 mL    Appearance: Normal	  HEENT:   NC/AT  Eye: Pink Conjunctiva  Lungs: CTA B/L  CVS: RRR, Normal S1 and S2, No Edema  Pulses: Normal distal pulses.  Neuro: A&O x3    TELEMETRY: SR/SB, frequent PVCs, NSVT   	  RADIOLOGY:   < from: US Duplex Venous Lower Ext Complete, Bilateral (06.17.19 @ 18:07) >   EXAM:  US DPLX LWR EXT VEINS COMPL BI                          PROCEDURE DATE:  06/17/2019          INTERPRETATION:  Ultrasound of the lower extremity deep venous system         CLINICAL INFORMATION:  Pulmonary embolus, evaluate for deep venous   thrombosis.    TECHNIQUE:   Duplex ultrasonography with color and spectral doppler of   the bilateral lower extremity deep venous system was performed.    FINDINGS:    No previous examinations are  available for review.    The bilateral lower extremity deep venous system demonstrated no   abnormality.  The veins were patent with intact Doppler flow.  The flow   varied with respiration and augmented with distal calf compression.  The   veins were directly compressible by the ultrasound transducer.     The veins evaluated included the common femoral vein, the inflow of the   greater saphenous vein, the inflow of the deep femoral vein, the   superficial femoral vein, the popliteal vein and posterior tibial veins.        IMPRESSION:   Unremarkable ultrasound of the bilateral lower extremity   deep venous system.          < end of copied text >        < from: CT Angio Chest w/ IV Cont (05.30.19 @ 08:57) >   EXAM:  CT ANGIO CHEST (W)AW IC                          PROCEDURE DATE:  05/30/2019          INTERPRETATION:  CTA chest .  COMPARISON: None.  CLINICAL INFORMATION: chest pain, dyspnea.  TECHNIQUE: Contiguous axial 1.25 mm slice thickness images ofthe chest   were obtained after intravenous contrast administration utilizing PE   protocol.  Maximum intensity projection,(MIP) 3-D,  imaging was created and   interpreted.  100 mls of Omnipaque 300 was administered intravenously without   complication and 0 mls were discarded.    FINDINGS:  Multiple RIGHT upper lobe and proximal RIGHT lower lobe proximal   segmental nonobstructing emboli noted. A LEFT pulmonary arteries patent..    The mediastinum great vessels are normal.     There are no mediastinal masses or lymphadenopathy.     The heart and pericardium are normal.      The airway is patent showing normal caliber and contour.    A RIGHT upper lobe apical area of reticular nodular fibrosis noted. There   is no pleural effusion or pneumothorax.    Visualized upper abdominal viscera unremarkable.    The bones are normal.    IMPRESSION:    A RIGHT upper lobe a RIGHT lower lobe proximal segmental nonobstructing   pulmonary emboli.    . Critical value  discussed with cardiac charge nurse Christiano, on   5/30/2019 at 8:40 AM with read back.  Hospital policies for critical values including read back   policy were followed.  The verbal communication of the critical value   supplements this written report.                           12.6   6.4   )-----------( 202      ( 18 Jun 2019 05:36 )             39.4     06-18    142  |  106  |  15.0  ----------------------------<  93  3.4<L>   |  25.0  |  0.65    Ca    8.2<L>      18 Jun 2019 05:36  Phos  2.9     06-18  Mg     1.8     06-18    TPro  6.8  /  Alb  3.8  /  TBili  0.9  /  DBili  x   /  AST  51<H>  /  ALT  32  /  AlkPhos  92  06-17    ASSESSMENT/PLAN: 	  pt was seen and examined.   Chart reviewed. RHC from October 2018 done by Dr Diaz reviewed as well  Case d/w jenna

## 2019-06-18 NOTE — PROGRESS NOTE ADULT - ASSESSMENT
Hemochromatosis: no phlebotomy needed at this time. will resume phl;ebotomies as outpatient    possible SAH; anticoagulation on hold. brain MRI being considered to confirm presence of SAH. Patient has a hisotry of afib and PE. resume anticoagualtion when cleared by NSG    severe TR: being followed by CVTS

## 2019-06-19 DIAGNOSIS — I62.9 NONTRAUMATIC INTRACRANIAL HEMORRHAGE, UNSPECIFIED: ICD-10-CM

## 2019-06-19 LAB
ALBUMIN SERPL ELPH-MCNC: 3.3 G/DL — SIGNIFICANT CHANGE UP (ref 3.3–5.2)
ALP SERPL-CCNC: 72 U/L — SIGNIFICANT CHANGE UP (ref 40–120)
ALT FLD-CCNC: 20 U/L — SIGNIFICANT CHANGE UP
ANION GAP SERPL CALC-SCNC: 13 MMOL/L — SIGNIFICANT CHANGE UP (ref 5–17)
AST SERPL-CCNC: 27 U/L — SIGNIFICANT CHANGE UP
BASOPHILS # BLD AUTO: 0 K/UL — SIGNIFICANT CHANGE UP (ref 0–0.2)
BASOPHILS NFR BLD AUTO: 0.3 % — SIGNIFICANT CHANGE UP (ref 0–2)
BILIRUB DIRECT SERPL-MCNC: 0.4 MG/DL — HIGH (ref 0–0.3)
BILIRUB INDIRECT FLD-MCNC: 1 MG/DL — SIGNIFICANT CHANGE UP (ref 0.2–1)
BILIRUB SERPL-MCNC: 1.4 MG/DL — SIGNIFICANT CHANGE UP (ref 0.4–2)
BUN SERPL-MCNC: 10 MG/DL — SIGNIFICANT CHANGE UP (ref 8–20)
CALCIUM SERPL-MCNC: 9.1 MG/DL — SIGNIFICANT CHANGE UP (ref 8.6–10.2)
CHLORIDE SERPL-SCNC: 103 MMOL/L — SIGNIFICANT CHANGE UP (ref 98–107)
CO2 SERPL-SCNC: 25 MMOL/L — SIGNIFICANT CHANGE UP (ref 22–29)
CREAT SERPL-MCNC: 0.57 MG/DL — SIGNIFICANT CHANGE UP (ref 0.5–1.3)
EOSINOPHIL # BLD AUTO: 0.1 K/UL — SIGNIFICANT CHANGE UP (ref 0–0.5)
EOSINOPHIL NFR BLD AUTO: 2.3 % — SIGNIFICANT CHANGE UP (ref 0–5)
GLUCOSE SERPL-MCNC: 81 MG/DL — SIGNIFICANT CHANGE UP (ref 70–115)
HCT VFR BLD CALC: 39.8 % — LOW (ref 42–52)
HGB BLD-MCNC: 12.8 G/DL — LOW (ref 14–18)
LYMPHOCYTES # BLD AUTO: 0.8 K/UL — LOW (ref 1–4.8)
LYMPHOCYTES # BLD AUTO: 13 % — LOW (ref 20–55)
MAGNESIUM SERPL-MCNC: 2.2 MG/DL — SIGNIFICANT CHANGE UP (ref 1.6–2.6)
MCHC RBC-ENTMCNC: 29.3 PG — SIGNIFICANT CHANGE UP (ref 27–31)
MCHC RBC-ENTMCNC: 32.2 G/DL — SIGNIFICANT CHANGE UP (ref 32–36)
MCV RBC AUTO: 91.1 FL — SIGNIFICANT CHANGE UP (ref 80–94)
MONOCYTES # BLD AUTO: 1 K/UL — HIGH (ref 0–0.8)
MONOCYTES NFR BLD AUTO: 16.5 % — HIGH (ref 3–10)
NEUTROPHILS # BLD AUTO: 3.9 K/UL — SIGNIFICANT CHANGE UP (ref 1.8–8)
NEUTROPHILS NFR BLD AUTO: 67.7 % — SIGNIFICANT CHANGE UP (ref 37–73)
PHOSPHATE SERPL-MCNC: 3.5 MG/DL — SIGNIFICANT CHANGE UP (ref 2.4–4.7)
PLATELET # BLD AUTO: 225 K/UL — SIGNIFICANT CHANGE UP (ref 150–400)
POTASSIUM SERPL-MCNC: 3.8 MMOL/L — SIGNIFICANT CHANGE UP (ref 3.5–5.3)
POTASSIUM SERPL-SCNC: 3.8 MMOL/L — SIGNIFICANT CHANGE UP (ref 3.5–5.3)
PROT SERPL-MCNC: 5.8 G/DL — LOW (ref 6.6–8.7)
RBC # BLD: 4.37 M/UL — LOW (ref 4.6–6.2)
RBC # FLD: 17 % — HIGH (ref 11–15.6)
SODIUM SERPL-SCNC: 141 MMOL/L — SIGNIFICANT CHANGE UP (ref 135–145)
WBC # BLD: 5.8 K/UL — SIGNIFICANT CHANGE UP (ref 4.8–10.8)
WBC # FLD AUTO: 5.8 K/UL — SIGNIFICANT CHANGE UP (ref 4.8–10.8)

## 2019-06-19 PROCEDURE — 99223 1ST HOSP IP/OBS HIGH 75: CPT

## 2019-06-19 PROCEDURE — 93451 RIGHT HEART CATH: CPT | Mod: 26

## 2019-06-19 PROCEDURE — 99232 SBSQ HOSP IP/OBS MODERATE 35: CPT

## 2019-06-19 PROCEDURE — 76937 US GUIDE VASCULAR ACCESS: CPT | Mod: 26

## 2019-06-19 PROCEDURE — 71045 X-RAY EXAM CHEST 1 VIEW: CPT | Mod: 26

## 2019-06-19 PROCEDURE — 78582 LUNG VENTILAT&PERFUS IMAGING: CPT | Mod: 26

## 2019-06-19 RX ORDER — ASPIRIN/CALCIUM CARB/MAGNESIUM 324 MG
81 TABLET ORAL DAILY
Refills: 0 | Status: DISCONTINUED | OUTPATIENT
Start: 2019-06-19 | End: 2019-06-21

## 2019-06-19 RX ORDER — ATORVASTATIN CALCIUM 80 MG/1
40 TABLET, FILM COATED ORAL AT BEDTIME
Refills: 0 | Status: DISCONTINUED | OUTPATIENT
Start: 2019-06-19 | End: 2019-06-21

## 2019-06-19 RX ORDER — ENOXAPARIN SODIUM 100 MG/ML
70 INJECTION SUBCUTANEOUS ONCE
Refills: 0 | Status: COMPLETED | OUTPATIENT
Start: 2019-06-19 | End: 2019-06-19

## 2019-06-19 RX ORDER — RIOCIGUAT 1.5 MG/1
0.5 TABLET, FILM COATED ORAL THREE TIMES A DAY
Refills: 0 | Status: DISCONTINUED | OUTPATIENT
Start: 2019-06-19 | End: 2019-06-21

## 2019-06-19 RX ORDER — ENOXAPARIN SODIUM 100 MG/ML
70 INJECTION SUBCUTANEOUS EVERY 12 HOURS
Refills: 0 | Status: DISCONTINUED | OUTPATIENT
Start: 2019-06-19 | End: 2019-06-20

## 2019-06-19 RX ADMIN — CITALOPRAM 20 MILLIGRAM(S): 10 TABLET, FILM COATED ORAL at 11:59

## 2019-06-19 RX ADMIN — CHLORHEXIDINE GLUCONATE 1 APPLICATION(S): 213 SOLUTION TOPICAL at 11:47

## 2019-06-19 RX ADMIN — Medication 25 MILLIGRAM(S): at 05:41

## 2019-06-19 RX ADMIN — Medication 3 MILLIGRAM(S): at 22:12

## 2019-06-19 RX ADMIN — Medication 100 MILLIGRAM(S): at 11:59

## 2019-06-19 RX ADMIN — ATORVASTATIN CALCIUM 40 MILLIGRAM(S): 80 TABLET, FILM COATED ORAL at 22:12

## 2019-06-19 RX ADMIN — Medication 1 MILLIGRAM(S): at 11:59

## 2019-06-19 RX ADMIN — PANTOPRAZOLE SODIUM 40 MILLIGRAM(S): 20 TABLET, DELAYED RELEASE ORAL at 11:59

## 2019-06-19 RX ADMIN — RIOCIGUAT 0.5 MILLIGRAM(S): 1.5 TABLET, FILM COATED ORAL at 22:12

## 2019-06-19 RX ADMIN — ENOXAPARIN SODIUM 70 MILLIGRAM(S): 100 INJECTION SUBCUTANEOUS at 03:08

## 2019-06-19 NOTE — PROGRESS NOTE ADULT - ASSESSMENT
79 y/o M with a PMHx of pulmonary hypertension, severe TR (on STANLEY 06/14/19), large PFO, Afib on coumadin, Pulmonary embolism (CTA 05/30/19), PNA in 2008 complicated by septic multisystem organ failure requiring HD, resolved, Hemochromatosis, HTN, HLD, and who was transferred from [facility] after fall with questionable syncope with right SAH. Patient states that he was drinking beers and a double screwdriver yesterday, and thinks he slipped and fell on the floor. Unclear if patient had a syncopal event or not, patient doesn't think he passed out, but can't be completely sure. Patient states that he has been experiencing dyspnea on exertion for a long time, being worked up by Pulm, CT surgery, and Cardiology. Patient  is currently resting comfortably, denies SOB at rest. Patient hasn't followed up with his Cardiologist since his recent STANLEY. Patient denies any fevers, chills, CP, LE swelling, headache, dizziness, abdominal pain, N/V/D, vision changes, unilateral weakness.       Right cerebellar CVA-  ?Small SAH-  Repeat CT head on arrival read as likely calcification.  MRI brain today  Q4 neuro checks  Neurosx following     Severe Pulmonary HTN and RV failure likely from chronic PE  - PAP 55/20 and PCWP 8 mmHG on cardiac cath 10/2018  - STANLEY on 06/14/19 showed EF 50-55%, severe pulmonary HTN, severely reduced RV fxn, severe TR, and + PFO  - Repeat CTA chest to r/o new PE  - RHC in October of last year.  - Plan for RHC tomorrow per cardio  - Plan to start adempas 0.5mg TID after RHC tomorrow.   Cardio appreciated      2. NSVT  - Secondary to severe right heart failure   - D/C metoprolol tartrate, trial Toprol XL 25mg qday   - Continue telemetry monitoring  - Keep K>4, Mg>2  Cardio appreciated  maintain tele    3. Pulmonary Embolism  - RUL and RLL segmental non-obstructing PE noted on CTA 05/30/19  - CTA chest today to r/o new acute Pulmonary embolism in setting of NSVT and ?syncope   - CT Surgery following  - Plan to start therapeutic lovenox of MRI head is normal and cleared by Neurosurgery    4. Afib  - Currently in SB   - Toprol XL 25 daily  - Plan to start therapeutic lovenox of MRI head is normal and cleared by Neurosurgery    5. Hemochromatosis  - Heme consult: no phlebotomy needed at this time. will resume phlebotomies as outpatient  Abd US      Severe TR-  -was being worked up as an outpatient for sx   TTE: EF 40 to 45%.Grade I diastolic dysfunction. Severely enlarged right ventricle. Severely reduced RV systolic function.Moderate-severe tricuspid regurgitation. Moderate aortic regurgitation. Moderate to severe pulmonic valve regurgitation.   severe pulmonary hypertension.  -would await MRI and clearance from neuro prior to any surgical interventions   CTS following      ETOH abuse-  US with hepatomegaly & mild nodularity likely form hemochromatosis, no lab evidence of cirrhosis  hep panel  CIWA protocol  Thiamine folate  SW consult      DVT: Lovenox after MRI -ve 77 y/o M with a PMHx of pulmonary hypertension, severe TR (on STANLEY 06/14/19), large PFO, Afib on coumadin, Pulmonary embolism (CTA 05/30/19), PNA in 2008 complicated by septic multisystem organ failure requiring HD, resolved, Hemochromatosis, HTN, HLD, and who was transferred from [facility] after fall with questionable syncope with right SAH. Patient states that he was drinking beers and a double screwdriver yesterday, and thinks he slipped and fell on the floor. Unclear if patient had a syncopal event or not, patient doesn't think he passed out, but can't be completely sure. Patient states that he has been experiencing dyspnea on exertion for a long time, being worked up by Pulm, CT surgery, and Cardiology. Patient  is currently resting comfortably, denies SOB at rest. Patient hasn't followed up with his Cardiologist since his recent STANLEY. Patient denies any fevers, chills, CP, LE swelling, headache, dizziness, abdominal pain, N/V/D, vision changes, unilateral weakness.       >Right cerebellar CVA-  ?Small SAH vs calcification on CT head   MRI brain: 4 mm acute infarct right cerebellum.  Q4 neuro checks  Neurosx following  Neurology called  Add ASA 81, increase statin dose, Lipid panel, A1c  Echo as below  Carotid dupplex  DVT ppx: NOAC tonight as per cardio     Severe Pulmonary HTN and RV failure likely from chronic PE  - PAP 55/20 and PCWP 8 mmHG on cardiac cath 10/2018  - STANLEY on 06/14/19 showed EF 50-55%, severe pulmonary HTN, severely reduced RV fxn, severe TR, and + PFO  - Repeat CTA chest -ve for PE  - RHC in October of last year.  - RHC: Severe pulmonary hypertension- 80 systolic. Wedge normal. L to R shunt with PFO/ASD.   - Plan to start adempas 0.5mg TID today as per cardio        2. NSVT  - Secondary to severe right heart failure   - c/w Toprol XL 25mg qday   - Continue telemetry monitoring  - Keep K>4, Mg>2  Cardio appreciated  maintain tele    3. Pulmonary Embolism  - RUL and RLL segmental non-obstructing PE noted on CTA 05/30/19  - CTA chest-ve for PE    4. Afib  - Currently in SB   - Toprol XL 25 daily  -Therapeutic lovenox x 1 dose give last night. NOAC to be given tonight as per cardio    5. Hemochromatosis  - Heme consult: no phlebotomy needed at this time. will resume phlebotomies as outpatient      Severe TR-  -was being worked up as an outpatient for sx   TTE: EF 40 to 45%.Grade I diastolic dysfunction. Severely enlarged right ventricle. Severely reduced RV systolic function.Moderate-severe tricuspid regurgitation. Moderate aortic regurgitation. Moderate to severe pulmonic valve regurgitation.   severe pulmonary hypertension.  -would await MRI and clearance from neuro prior to any surgical interventions   CTS following      ETOH abuse-  US with hepatomegaly & mild nodularity likely form hemochromatosis, no lab evidence of cirrhosis  hep panel  CIWA protocol  Thiamine folate  SW consult      DVT: NOAC tonight

## 2019-06-19 NOTE — PROGRESS NOTE ADULT - SUBJECTIVE AND OBJECTIVE BOX
CHIEF COMPLAINT:Patient is a 78y old  Male who presents with a chief complaint of Shortness of breath (19 Jun 2019 09:23)    INTERVAL HISTORY:    s/p RHC with severe pulmonary HTN in setting of cetph.  Also has an PFO/ASD.  Pt with CVA on MRI but no SDH.     Allergies  No Known Allergies    	  MEDICATIONS:  metoprolol succinate ER 25 milliGRAM(s) Oral daily  acetaminophen   Tablet .. 650 milliGRAM(s) Oral every 6 hours  citalopram 20 milliGRAM(s) Oral daily  LORazepam   Injectable 2 milliGRAM(s) IV Push every 2 hours PRN  melatonin 3 milliGRAM(s) Oral at bedtime  ondansetron Injectable 4 milliGRAM(s) IV Push every 6 hours PRN  docusate sodium 100 milliGRAM(s) Oral every 8 hours  pantoprazole  Injectable 40 milliGRAM(s) IV Push daily  senna 2 Tablet(s) Oral at bedtime  atorvastatin 40 milliGRAM(s) Oral at bedtime  aspirin  chewable 81 milliGRAM(s) Oral daily  chlorhexidine 2% Cloths 1 Application(s) Topical <User Schedule>  folic acid 1 milliGRAM(s) Oral daily  thiamine 100 milliGRAM(s) Oral daily    PHYSICAL EXAM  T(C): 36.4 (06-19-19 @ 08:20), Max: 36.6 (06-18-19 @ 16:20)  HR: 70 (06-19-19 @ 11:05) (63 - 72)  BP: 119/63 (06-19-19 @ 11:05) (108/60 - 128/69)  RR: 20 (06-19-19 @ 11:05) (18 - 20)  SpO2: 97% (06-19-19 @ 11:05) (91% - 100%)  I&O's Summary    18 Jun 2019 07:01  -  19 Jun 2019 07:00  --------------------------------------------------------  IN: 475 mL / OUT: 1050 mL / NET: -575 mL    19 Jun 2019 07:01  -  19 Jun 2019 15:40  --------------------------------------------------------  IN: 0 mL / OUT: 100 mL / NET: -100 mL  Appearance: Normal	  HEENT:   NC/AT  Eye: Pink Conjunctiva  Lungs: CTA B/L  CVS: RRR, Normal S1 and S2, normal split of second HS.   Pulses: Normal distal pulses.  Neuro: A&O x3      LABS:	 	                      12.8   5.8   )-----------( 225      ( 19 Jun 2019 06:25 )             39.8     06-19    141  |  103  |  10.0  ----------------------------<  81  3.8   |  25.0  |  0.57    Ca    9.1      19 Jun 2019 06:25  Phos  3.5     06-19  Mg     2.2     06-19    TPro  5.8<L>  /  Alb  3.3  /  TBili  1.4  /  DBili  0.4<H>  /  AST  27  /  ALT  20  /  AlkPhos  72  06-19    ASSESSMENT/PLAN:   1. pulmonary HTN with Hx of PE. No PE on recnet CT scan  2. Obtain VQ scan for distal PE not seen on CT scan  3. Start adempas 0.5 mg tid, DW clinical pharmacist   4. Recent CVA, agree with increasing dose of statin. Plan for closure of ASD as outpt.  5. Low dose ASA, warfarin for now. If no hemorrhagic conversion can use DOAC in the future.

## 2019-06-19 NOTE — CONSULT NOTE ADULT - ASSESSMENT
6/18 MRIB W/O: Small acute infarct involving the right nickie measuring 7 mm. Extra-axial collection in the left frontal lobe measuring 8 mm which is restricting with some  GRE signal. This statistically suggest a small hemorrhage. A small empyema cannot be entirely excluded.  neurology following for stroke   called by Dr Romero for MRI findings eval     case d/w Dr Cummings and images reviewed at 12:12  no immediate NSx intervention indicated at this time  neuro-checks q1 HR and MICU admit  MRI w gadolinium to r/o empyema   hold all AC/AP, ASA and heparin for HD/ no boluses until contrasted MRI B available for review   HOB > 30 degrees  case/plan d/w Dr. Romero

## 2019-06-19 NOTE — PROGRESS NOTE ADULT - SUBJECTIVE AND OBJECTIVE BOX
78y Male who had a RHC which showed severe pulmonary HTN, left to right shunt 1.8 PFO/ASD, normal wedge, via RFV #7fr sheath manually removed by RN without incident . Patient awake and alert without complaints. Denies chest pain, sob, palps.      Neuro: A&OX3  Lungs: CTA B/L  CV: S1, S2, no murmur, RRR  Abd: Soft  Right Groin: Soft, no bleeding, no hematoma  Extremity: + distal pulses      A/P: 78y Male s/p RHC severe pulmonary HTN  1. Groin management discussed with patient  2. Continue current meds  3. Follow up as an outpatient with cardiologist regarding PFO/ASD closure  4. Bedrest x 3 hours  5. Medical management for pulmonary HTN

## 2019-06-19 NOTE — PROGRESS NOTE ADULT - SUBJECTIVE AND OBJECTIVE BOX
78 year old male being followed by Dr. Miller at [facility] for hemochromatosis. The patient had a liver MRI in August 2018 and was was found to have radiographic evidence of hepatic iron overload. On that scan, there was not mention of cirrhosis or hepatoma. The patient has been on a phleb program every 3 months. His last ferritin was 53.     The patient has a hisotry of alchohol absuse and valvular insufficiency    The patient was transferred from [facility] [place], admitted for witnessed mechanical fall while on Coumadin for Afib and history of PE in 5/19. CT head done in [facility] revealed right SAH, patient GCS 15 upon arrival. Ct scan here suggests possible calcification. Brain MRI showed 4 mm acute infarct in right cerebellum.  Patient seen, denies headache. no specific complaints.           PAST MEDICAL & SURGICAL HISTORY:  ETOH abuse  Nonrheumatic aortic valve insufficiency  Pneumonia  Sepsis  Hemochromatosis  Pulmonary hypertension  Chronic atrial fibrillation  Non-rheumatic tricuspid valve insufficiency  Hyperlipidemia, unspecified hyperlipidemia type  Essential hypertension  Family history of punctured lung  History of hammer toe correction  History of bunionectomy  Laceration of neck, complicated  History of shoulder surgery  History of total right hip replacement      ROS:  Negative except for:    MEDICATIONS  (STANDING):  acetaminophen   Tablet .. 650 milliGRAM(s) Oral every 6 hours  aspirin  chewable 81 milliGRAM(s) Oral daily  atorvastatin 40 milliGRAM(s) Oral at bedtime  chlorhexidine 2% Cloths 1 Application(s) Topical <User Schedule>  citalopram 20 milliGRAM(s) Oral daily  docusate sodium 100 milliGRAM(s) Oral every 8 hours  enoxaparin Injectable 70 milliGRAM(s) SubCutaneous every 12 hours  folic acid 1 milliGRAM(s) Oral daily  melatonin 3 milliGRAM(s) Oral at bedtime  metoprolol succinate ER 25 milliGRAM(s) Oral daily  pantoprazole  Injectable 40 milliGRAM(s) IV Push daily  riociguat 0.5 milliGRAM(s) Oral three times a day  senna 2 Tablet(s) Oral at bedtime  thiamine 100 milliGRAM(s) Oral daily    MEDICATIONS  (PRN):  LORazepam   Injectable 2 milliGRAM(s) IV Push every 2 hours PRN CIWA-Ar score increase by 2 points and a total score of 7 or less  ondansetron Injectable 4 milliGRAM(s) IV Push every 6 hours PRN Nausea      Allergies    No Known Allergies    Intolerances        Vital Signs Last 24 Hrs  T(C): 36.9 (19 Jun 2019 15:56), Max: 36.9 (19 Jun 2019 15:56)  T(F): 98.5 (19 Jun 2019 15:56), Max: 98.5 (19 Jun 2019 15:56)  HR: 73 (19 Jun 2019 15:56) (63 - 73)  BP: 114/72 (19 Jun 2019 15:56) (108/60 - 128/69)  BP(mean): --  RR: 18 (19 Jun 2019 15:56) (18 - 20)  SpO2: 90% (19 Jun 2019 15:56) (90% - 97%)    PHYSICAL EXAM  General: adult in NAD  HEENT: clear oropharynx, anicteric sclera, pink conjunctiva  Neck: supple  CV: normal S1/S2 with no murmur rubs or gallops  Lungs: clear  Abdomen: soft non-tender non-distended, no hepatosplenomegaly  Ext: no clubbing cyanosis or edema      LABS:                          12.8   5.8   )-----------( 225      ( 19 Jun 2019 06:25 )             39.8     Serial CBC's  06-19 @ 06:25  Hct-39.8 / Hgb-12.8 / Plat-225 / RBC-4.37 / WBC-5.8          Serial CBC's  06-18 @ 05:36  Hct-39.4 / Hgb-12.6 / Plat-202 / RBC-4.33 / WBC-6.4            06-19    141  |  103  |  10.0  ----------------------------<  81  3.8   |  25.0  |  0.57    Ca    9.1      19 Jun 2019 06:25  Phos  3.5     06-19  Mg     2.2     06-19    TPro  5.8<L>  /  Alb  3.3  /  TBili  1.4  /  DBili  0.4<H>  /  AST  27  /  ALT  20  /  AlkPhos  72  06-19          Platelet Count - Automated: 225 K/uL (06-19 @ 06:25)  Hemoglobin: 12.8 g/dL (06-19 @ 06:25)            RADIOLOGY & ADDITIONAL STUDIES:

## 2019-06-19 NOTE — PROGRESS NOTE ADULT - SUBJECTIVE AND OBJECTIVE BOX
Subjective: "I feel good "   Pt presents for RHC   Mallampati 3   ASA 3   BRA 1.3    Medications:  acetaminophen   Tablet .. 650 milliGRAM(s) Oral every 6 hours  atorvastatin 10 milliGRAM(s) Oral at bedtime  chlorhexidine 2% Cloths 1 Application(s) Topical <User Schedule>  citalopram 20 milliGRAM(s) Oral daily  docusate sodium 100 milliGRAM(s) Oral every 8 hours  folic acid 1 milliGRAM(s) Oral daily  LORazepam   Injectable 2 milliGRAM(s) IV Push every 2 hours PRN  melatonin 3 milliGRAM(s) Oral at bedtime  metoprolol succinate ER 25 milliGRAM(s) Oral daily  ondansetron Injectable 4 milliGRAM(s) IV Push every 6 hours PRN  pantoprazole  Injectable 40 milliGRAM(s) IV Push daily  senna 2 Tablet(s) Oral at bedtime  thiamine 100 milliGRAM(s) Oral daily      PHYSICAL EXAM:  Vital Signs Last 24 Hrs  T(C): 36.4 (2019 08:20), Max: 36.7 (2019 12:45)  T(F): 97.5 (2019 08:20), Max: 98 (2019 12:45)  HR: 63 (2019 08:20) (60 - 71)  BP: 118/67 (2019 08:20) (106/66 - 127/76)  BP(mean): 96 (2019 12:00) (82 - 96)  RR: 20 (2019 08:20) (18 - 27)  SpO2: 93% (2019 08:20) (91% - 100%)  Daily Height in cm: 172.72 (2019 08:20)    Daily Weight in k.1 (2019 04:52)          General: A/ox 3, No acute Distress  Neck: Supple, NO JVD  Cardiac: S1 S2, 2-3/KATE   Pulmonary: decreased bilaterally R>L   Abdomen: Soft, Non -tender, +BS   Extremities: No Rashes, No edema  Neuro: A/o x 3, No focal deficits  Psch: normal mood , normal affect    LABS:                          12.8   5.8   )-----------( 225      ( 2019 06:25 )             39.8     06-19    141  |  103  |  10.0  ----------------------------<  81  3.8   |  25.0  |  0.57    Ca    9.1      2019 06:25  Phos  3.5     -  Mg     2.2         TPro  5.8<L>  /  Alb  3.3  /  TBili  1.4  /  DBili  0.4<H>  /  AST  27  /  ALT  20  /  AlkPhos  72          < from: MR Head No Cont (19 @ 22:45) >  IMPRESSION:   4 mm acute infarct right cerebellum. Subjective: "I feel good "   Pt presents for RHC   Mallampati 3   ASA 3   BRA 1.3  GFR 99     Medications:  acetaminophen   Tablet .. 650 milliGRAM(s) Oral every 6 hours  atorvastatin 10 milliGRAM(s) Oral at bedtime  chlorhexidine 2% Cloths 1 Application(s) Topical <User Schedule>  citalopram 20 milliGRAM(s) Oral daily  docusate sodium 100 milliGRAM(s) Oral every 8 hours  folic acid 1 milliGRAM(s) Oral daily  LORazepam   Injectable 2 milliGRAM(s) IV Push every 2 hours PRN  melatonin 3 milliGRAM(s) Oral at bedtime  metoprolol succinate ER 25 milliGRAM(s) Oral daily  ondansetron Injectable 4 milliGRAM(s) IV Push every 6 hours PRN  pantoprazole  Injectable 40 milliGRAM(s) IV Push daily  senna 2 Tablet(s) Oral at bedtime  thiamine 100 milliGRAM(s) Oral daily      PHYSICAL EXAM:  Vital Signs Last 24 Hrs  T(C): 36.4 (2019 08:20), Max: 36.7 (2019 12:45)  T(F): 97.5 (2019 08:20), Max: 98 (2019 12:45)  HR: 63 (2019 08:20) (60 - 71)  BP: 118/67 (2019 08:20) (106/66 - 127/76)  BP(mean): 96 (2019 12:00) (82 - 96)  RR: 20 (2019 08:20) (18 - 27)  SpO2: 93% (2019 08:20) (91% - 100%)  Daily Height in cm: 172.72 (2019 08:20)    Daily Weight in k.1 (2019 04:52)          General: A/ox 3, No acute Distress  Neck: Supple, NO JVD  Cardiac: S1 S2, 2-3/KATE   Pulmonary: decreased bilaterally R>L   Abdomen: Soft, Non -tender, +BS   Extremities: No Rashes, No edema  Neuro: A/o x 3, No focal deficits  Psch: normal mood , normal affect    LABS:                          12.8   5.8   )-----------( 225      ( 2019 06:25 )             39.8     06-19    141  |  103  |  10.0  ----------------------------<  81  3.8   |  25.0  |  0.57    Ca    9.1      2019 06:25  Phos  3.5       Mg     2.2         TPro  5.8<L>  /  Alb  3.3  /  TBili  1.4  /  DBili  0.4<H>  /  AST  27  /  ALT  20  /  AlkPhos  72          < from: MR Head No Cont (19 @ 22:45) >  IMPRESSION:   4 mm acute infarct right cerebellum.

## 2019-06-19 NOTE — CONSULT NOTE ADULT - SUBJECTIVE AND OBJECTIVE BOX
78[place] [facility] [place] a witnessed mechanical fall and a head CT showed a SAH. Fall was likely was related to EtOH.     Imaging showed (ind reviewed):  HEAD CT - SDH  MRI Brain - 4 mm acute infarct right cerebellum.    Patient medically being worked up for syncope. Has severe pulmonary HTN and RV failure on STALNEY. Workup also showed PE.   Patient is frustrated and wants to go home. Reports no pain.      REVIEW OF SYSTEMS  Constitutional - No fever, No weight loss, +fatigue  HEENT - No eye pain, No visual disturbances, No difficulty hearing, No tinnitus, No vertigo, No neck pain  Respiratory - No cough, No wheezing, No shortness of breath  Cardiovascular - No chest pain, No palpitations  Gastrointestinal - No abdominal pain, No nausea, No vomiting, No diarrhea, No constipation  Genitourinary - No dysuria, No frequency, No hematuria, No incontinence  Neurological - No headaches, No memory loss, No loss of strength, No numbness, No tremors  Skin - No itching, No rashes, No lesions   Endocrine - No temperature intolerance  Musculoskeletal - No joint pain, No joint swelling, No muscle pain  Psychiatric - No depression, No anxiety    VITALS  T(C): 36.4 (06-19-19 @ 08:20), Max: 36.6 (06-18-19 @ 16:20)  HR: 70 (06-19-19 @ 11:05) (63 - 72)  BP: 119/63 (06-19-19 @ 11:05) (108/60 - 128/69)  RR: 20 (06-19-19 @ 11:05) (18 - 20)  SpO2: 97% (06-19-19 @ 11:05) (91% - 100%)  Wt(kg): --    PAST MEDICAL & SURGICAL HISTORY  ETOH abuse  Nonrheumatic aortic valve insufficiency  Pneumonia  Sepsis  Hemochromatosis  Pulmonary hypertension  Chronic atrial fibrillation  Non-rheumatic tricuspid valve insufficiency  Hyperlipidemia, unspecified hyperlipidemia type  Essential hypertension  Family history of punctured lung  History of hammer toe correction  History of bunionectomy  Laceration of neck, complicated  History of shoulder surgery  History of total right hip replacement      SOCIAL HISTORY  Smoking - Quit  EtOH - ++beer  Drugs - Denied    FUNCTIONAL HISTORY  Lives with spouse, 4 EDMAR  Independent    CURRENT FUNCTIONAL STATUS  6/18  Bed Mobility: Rolling/Turning:     · Level of [place]	independent	    Bed Mobility: Scooting/Bridging:     · Level of [place]	independent	    Bed Mobility: Sit to Supine:     · Level of [place]	independent	    Bed Mobility: Supine to Sit:     · Level of [place]	independent	    Transfer: Sit to Stand:     · Level of [place]	supervision	  · Physical Assist/Nonphysical Assist	verbal cues	  · Weight-Bearing Restrictions	full weight-bearing	    Transfer: Stand to Sit:     · Level of [place]	supervision	  · Physical Assist/Nonphysical Assist	verbal cues	  · Weight-Bearing Restrictions	full weight-bearing	    Sit/Stand Transfer Safety Analysis:     · Impairments Contributing to Impaired Transfers	decreased strength; decreased activity tolerance	    Gait Skills:     · Level of [place]	supervision	  · Physical Assist/Nonphysical Assist	verbal cues	  · Weight-Bearing Restrictions	full weight-bearing	  · Gait Distance	5 feet	        FAMILY HISTORY   Family history of diabetes mellitus  Family history of dementia  Family history of myocardial infarction      RECENT LABS/IMAGING  CBC Full  -  ( 19 Jun 2019 06:25 )  WBC Count : 5.8 K/uL  RBC Count : 4.37 M/uL  Hemoglobin : 12.8 g/dL  Hematocrit : 39.8 %  Platelet Count - Automated : 225 K/uL  Mean Cell Volume : 91.1 fl  Mean Cell Hemoglobin : 29.3 pg  Mean Cell Hemoglobin Concentration : 32.2 g/dL  Auto Neutrophil # : 3.9 K/uL  Auto Lymphocyte # : 0.8 K/uL  Auto Monocyte # : 1.0 K/uL  Auto Eosinophil # : 0.1 K/uL  Auto Basophil # : 0.0 K/uL  Auto Neutrophil % : 67.7 %  Auto Lymphocyte % : 13.0 %  Auto Monocyte % : 16.5 %  Auto Eosinophil % : 2.3 %  Auto Basophil % : 0.3 %    06-19    141  |  103  |  10.0  ----------------------------<  81  3.8   |  25.0  |  0.57    Ca    9.1      19 Jun 2019 06:25  Phos  3.5     06-19  Mg     2.2     06-19    TPro  5.8<L>  /  Alb  3.3  /  TBili  1.4  /  DBili  0.4<H>  /  AST  27  /  ALT  20  /  AlkPhos  72  06-19        ALLERGIES  No Known Allergies      MEDICATIONS   acetaminophen   Tablet .. 650 milliGRAM(s) Oral every 6 hours  aspirin  chewable 81 milliGRAM(s) Oral daily  atorvastatin 40 milliGRAM(s) Oral at bedtime  chlorhexidine 2% Cloths 1 Application(s) Topical <User Schedule>  citalopram 20 milliGRAM(s) Oral daily  docusate sodium 100 milliGRAM(s) Oral every 8 hours  folic acid 1 milliGRAM(s) Oral daily  LORazepam   Injectable 2 milliGRAM(s) IV Push every 2 hours PRN  melatonin 3 milliGRAM(s) Oral at bedtime  metoprolol succinate ER 25 milliGRAM(s) Oral daily  ondansetron Injectable 4 milliGRAM(s) IV Push every 6 hours PRN  pantoprazole  Injectable 40 milliGRAM(s) IV Push daily  senna 2 Tablet(s) Oral at bedtime  thiamine 100 milliGRAM(s) Oral daily      ----------------------------------------------------------------------------------------  PHYSICAL EXAM  Constitutional - NAD, Comfortable  HEENT - NCAT, EOMI  Neck - Supple, No limited ROM  Chest - Breathing comfortably, No wheezing  Cardiovascular - S1S2   Abdomen - Soft   Extremities - No C/C/E, No calf tenderness   Neurologic Exam -                    Cognitive - Awake, Alert, AAO to self, place, date, year, situation     Communication - Fluent, No dysarthria     Cranial Nerves - CN 2-12 intact     Motor - No focal deficits                    LEFT    UE - ShAB 5/5, EF 5/5, EE 5/5, WE 5/5,  5/5                    RIGHT UE - ShAB 5/5, EF 5/5, EE 5/5, WE 5/5,  5/5                    LEFT    LE - HF 5/5, KE 5/5, DF 5/5, PF 5/5                    RIGHT LE - HF 5/5, KE 5/5, DF 5/5, PF 5/5        Sensory - Intact to LT  Psychiatric - Mood stable, Affect WNL  ----------------------------------------------------------------------------------------  ASSESSMENT/PLAN  78yMale with functional deficits after an EtOH related fall sustaining a SAH and acute CVA  Acute CVA - ASA, Lipitor  EtOH - CIWA, Ativan, Folate, Thiamine  Mood - Celexa   Pain - Tylenol  Constipation - Coalce, Senna  DVT PPX - SCDs  Rehab - Patient is making significant functional progress for DC HOME.     Will continue to follow. Functional progress will determine ongoing rehab dispo recommendations, which may change.    Continue bedside therapy as well as OOB throughout the day with mobilization by staff to maintain cardiopulmonary function and prevention of secondary complications related to debility.
[facility]                                     Neu[facility] [place]                                 Emili Padilla, & Gary                                  [address]                                        [place], [place], [address]                                             [phone]    CC: stroke on MRI  HPI: The patient is a 78y Male who presented as a transfer from [facility] for [place] wioth head injury.  UInitial CT head showed possible SAH, subsequent imaging showed calcification, not blood.  he says he tripped and fell, hitting his head in the process. He denies LOC, but was confused after the fall. Subsequent MRI at [facility] showed tiny cerebellar CVA.  neurology is called.      PAST MEDICAL & SURGICAL HISTORY:  ETOH abuse  Nonrheumatic aortic valve insufficiency  Pneumonia  Sepsis  Hemochromatosis  Pulmonary hypertension  Chronic atrial fibrillation  Non-rheumatic tricuspid valve insufficiency  Hyperlipidemia, unspecified hyperlipidemia type  Essential hypertension  Family history of punctured lung  History of hammer toe correction  History of bunionectomy  Laceration of neck, complicated  History of shoulder surgery  History of total right hip replacement      MEDICATIONS  (STANDING):  acetaminophen   Tablet .. 650 milliGRAM(s) Oral every 6 hours  aspirin  chewable 81 milliGRAM(s) Oral daily  atorvastatin 40 milliGRAM(s) Oral at bedtime  chlorhexidine 2% Cloths 1 Application(s) Topical <User Schedule>  citalopram 20 milliGRAM(s) Oral daily  docusate sodium 100 milliGRAM(s) Oral every 8 hours  folic acid 1 milliGRAM(s) Oral daily  melatonin 3 milliGRAM(s) Oral at bedtime  metoprolol succinate ER 25 milliGRAM(s) Oral daily  pantoprazole  Injectable 40 milliGRAM(s) IV Push daily  senna 2 Tablet(s) Oral at bedtime  thiamine 100 milliGRAM(s) Oral daily    MEDICATIONS  (PRN):  LORazepam   Injectable 2 milliGRAM(s) IV Push every 2 hours PRN CIWA-Ar score increase by 2 points and a total score of 7 or less  ondansetron Injectable 4 milliGRAM(s) IV Push every 6 hours PRN Nausea      Allergies    No Known Allergies    Intolerances      SOCIAL HISTORY:  former tob,   (+) alcohol   no drugs    FAMILY HISTORY:  Family history of diabetes mellitus  Family history of dementia  Family history of myocardial infarction    ROS: 14 point ROS negative other than what is present in HPI or below    Vital Signs Last 24 Hrs  T(C): 36.4 (19 Jun 2019 08:20), Max: 36.6 (18 Jun 2019 16:20)  T(F): 97.5 (19 Jun 2019 08:20), Max: 97.9 (19 Jun 2019 05:37)  HR: 70 (19 Jun 2019 11:05) (63 - 72)  BP: 119/63 (19 Jun 2019 11:05) (108/60 - 128/69)  BP(mean): --  RR: 20 (19 Jun 2019 11:05) (18 - 20)  SpO2: 97% (19 Jun 2019 11:05) (91% - 100%)      General: NAD    Detailed Neurologic Exam:    Mental status: The patient is awake and alert and has normal attention span.  The patient is fully oriented in 3 spheres. The patient is oriented to current events. The patient is able to name objects, follow commands, repeat sentences.    Cranial nerves: Pupils equal and react symmetrically to light. There is no visual field deficit to confrontation. Extraocular motion is full with no nystagmus. There is no ptosis. Facial sensation is intact. Facial musculature is symmetric. Palate elevates symmetrically. Shoulder shrug is normal. Tongue is midline.    Motor: There is normal bulk and tone.  There is no tremor.  Strength is 5/5 in the right arm and leg.   Strength is 5/5 in the left arm and leg.    Sensation: Intact to light touch and pin in 4 extremities    Reflexes: 2+ throughout and plantar responses are flexor.    Cerebellar: There is no dysmetria on finger to nose testing.    Gait : deferred    NIHSS=0    LABS:                         12.8   5.8   )-----------( 225      ( 19 Jun 2019 06:25 )             39.8       06-19    141  |  103  |  10.0  ----------------------------<  81  3.8   |  25.0  |  0.57    Ca    9.1      19 Jun 2019 06:25  Phos  3.5     06-19  Mg     2.2     06-19    TPro  5.8<L>  /  Alb  3.3  /  TBili  1.4  /  DBili  0.4<H>  /  AST  27  /  ALT  20  /  AlkPhos  72  06-19        RADIOLOGY & ADDITIONAL STUDIES (independently reviewed unless otherwise noted):  MRI brain- tiny acute CVA in right cerebellum.  Now blood or mass noted.  (+) SVID    < from: [facility] Echo Doppler (06.14.19 @ 13:16) >  Summary:   1. Normal global left ventricular systolic function. Left ventricular   ejection fraction, by visual estimation, is 50 to 55%.   2. Severely enlarged right ventricle.   3. Severely reduced RV systolic function.   4. Severe tricuspid regurgitation.   5. Severe pulmonary hypertension.   6. Moderate mitral regurgitation.   7. Mild aortic regurgitation.   8. Mild pulmonic insufficiency.   9. Continuous right to left shunting through a large patent foramen   ovale.  10. No intracardiac thrombus.  11. Dilated pulmonary artery.  12. Nopericardial effusion.  13. ** No prior echocardiograms available for comparison.    LE doppler no DVT, but recent CT angio in May showed PE's
SUBJECTIVE   "i want to get the valves fixed you know, apparently they are pretty bad"     INTERIM HISTORY SIGNIFICANT FOR   patient admitted s/p mechanical fall at home after drinking now being worked out for ICH vs calcifications found on CT scan of head   cardiology requesting CT sx input for PE and known valvular issues / PFO         Patient is a 78y old  Male who presents with a chief complaint of ICH (17 Jun 2019 12:55)    HPI:  78 year old male transferred [place] [facility] [place] admitted for witnessed mechanical fall while on Coumadin for Afib. CT head done in [facility] [place] revealed right SAH, patient GCS 15 upon arrival. Admitted to SICU.    Primary Survey:  A: Protected, patient conversing  B: CTAB. Symmetrical chest rise  C: 2+ central (femoral) & peripheral pulses (Radial, DP)  D: GCS 15, MAEO, interacting. No andres disability noted  E: No gross deformities on primary exposure      CXR: Negative for evidence of hemo/pneumothorax    Secondary Survey:  Constitutional: Well-developed well nourished Male in no acute distress  HEENT: Head is normocephalic and atraumatic, maxillofacial structures stable, no blood or discharge from nares or oral cavity, no siegel sign / racoon eyes, EOMI b/l, pupils 3 mm round and reactive to light b/l, no active drainage or redness  Respiratory: Breath sounds CTA b/l respirations are unlabored, no accessory muscle use, no conversational dyspnea  Cardiovascular: Regular rate & rhythm, +S1, S2  Chest: Chest wall is non-tender to palpation, no subQ emphysema or crepitus palpated  Gastrointestinal: Abdomen soft, non-tender, non-distended, no rebound tenderness / guarding, no ecchymosis or external signs of abdominal trauma  Extremities: moving all extremities spontaneously, no point tenderness or deformity noted to upper or lower extremities b/l  Pelvis: stable  Vascular: 2+ radial, femoral, and DP pulses b/l  Neurological: GCS: 15 (4/5/6). A&O x 3; no gross sensory / motor / coordination deficits  Musculoskeletal: 5/5 strength of upper and lower extremities b/l  Back: no C/T/LS spine tenderness to palpation, no step-offs or signs of external trauma to the back (17 Jun 2019 00:14)    OBJECTIVE  PAST MEDICAL & SURGICAL HISTORY:  ETOH abuse  Nonrheumatic aortic valve insufficiency  Pneumonia  Sepsis  Hemochromatosis  Pulmonary hypertension  Chronic atrial fibrillation  Non-rheumatic tricuspid valve insufficiency  Hyperlipidemia, unspecified hyperlipidemia type  Essential hypertension  Family history of punctured lung  History of hammer toe correction  History of bunionectomy  Laceration of neck, complicated  History of shoulder surgery  History of total right hip replacement    No Known Allergies    Home Medications:  Advair Diskus 100 mcg-50 mcg inhalation powder: 1 puff(s) inhaled 2 times a day (17 Jun 2019 01:27)  citalopram 20 mg oral tablet: 1 tab(s) orally once a day (17 Jun 2019 01:27)  Glucosamine Chondroitin oral capsule: 3 cap(s) orally once a day (17 Jun 2019 01:27)  lovastatin 10 mg oral tablet: 1 tab(s) orally once a day (17 Jun 2019 01:27)  metoprolol: 12.5 milligram(s) orally once a day (17 Jun 2019 01:27)  Ventolin HFA 90 mcg/inh inhalation aerosol: 2 puff(s) inhaled 4 times a day, As Needed  (17 Jun 2019 01:27)  Vitamin D3 2000 intl units oral tablet: 1 tab(s) orally once a day (17 Jun 2019 01:27)  warfarin 3 mg oral tablet: 1 tab(s) orally once a day Monday-Friday (17 Jun 2019 01:27)  warfarin 4 mg oral tablet: 1 tab(s) orally once a day Saturday and Sunday (17 Jun 2019 01:27)    VITALS  Currently in sinus rhythm with vitals as below  ICU Vital Signs Last 24 Hrs  T(C): 36.5 (18 Jun 2019 04:00), Max: 36.8 (17 Jun 2019 16:00)  T(F): 97.7 (18 Jun 2019 04:00), Max: 98.3 (17 Jun 2019 16:00)  HR: 58 (18 Jun 2019 05:00) (45 - 77)  BP: 109/61 (18 Jun 2019 05:00) (85/54 - 119/77)  BP(mean): 79 (18 Jun 2019 05:00) (64 - 94)  ABP: --  ABP(mean): --  RR: 16 (18 Jun 2019 05:00) (12 - 46)  SpO2: 93% (18 Jun 2019 05:00) (90% - 98%)    Adult Advanced Hemodynamics Last 24 Hrs  CVP(mm Hg): --  CVP(cm H2O): --  CO: --  CI: --  PA: --  PA(mean): --  PCWP: --  SVR: --  SVRI: --  PVR: --  PVRI: --  LABS                        12.6   6.4   )-----------( 202      ( 18 Jun 2019 05:36 )             39.4   PT/INR - ( 17 Jun 2019 00:19 )   PT: 14.2 sec;   INR: 1.23 ratio         PTT - ( 17 Jun 2019 00:19 )  PTT:29.6 [id number]    145  |  107  |  21.0<H>  ----------------------------<  137<H>  3.9   |  24.0  |  0.81    Ca    8.7      17 Jun 2019 10:07  Phos  3.1     06-17  Mg     1.9     06-17    TPro  6.8  /  Alb  3.8  /  TBili  0.9  /  DBili  x   /  AST  51<H>  /  ALT  32  /  AlkPhos  92  06-17  CAPILLARY BLOOD GLUCOSE      POCT Blood Glucose.: 92 mg/dL (17 Jun 2019 23:59)      ABG - ( 17 Jun 2019 01:09 )  pH, Arterial: 7.42  pH, Blood: x     /  pCO2: 34    /  pO2: 60    / HCO3: 23    / Base Excess: -1.7  /  SaO2: 91                  IN/OUT    06-16-19 @ 07:01  -  06-17-19 @ 07:00  --------------------------------------------------------  IN: 875 mL / OUT: 225 mL / NET: 650 mL    06-17-19 @ 07:01  -  06-18-19 @ 06:04  --------------------------------------------------------  IN: 2655 mL / OUT: 225 mL / NET: 2430 mL      IMAGING  personally reviewed imaging     CURRENT MEDICATIONS  MEDICATIONS  (STANDING):  acetaminophen   Tablet .. 650 milliGRAM(s) Oral every 6 hours  atorvastatin 10 milliGRAM(s) Oral at bedtime  chlorhexidine 2% Cloths 1 Application(s) Topical <User Schedule>  citalopram 20 milliGRAM(s) Oral daily  docusate sodium 100 milliGRAM(s) Oral every 8 hours  melatonin 3 milliGRAM(s) Oral at bedtime  metoprolol tartrate 12.5 milliGRAM(s) Oral daily  multiple electrolytes Injection Type 1 1000 milliLiter(s) (75 mL/Hr) IV Continuous <Continuous>  pantoprazole  Injectable 40 milliGRAM(s) IV Push daily  senna 2 Tablet(s) Oral at bedtime    MEDICATIONS  (PRN):  ondansetron Injectable 4 milliGRAM(s) IV Push every 6 hours PRN Nausea
FULL CONSULT TO FOLLOW
Patient is a 78y old  Male who presents with a chief complaint of fall on coumadin.    HPI: 77 y/o M with a PMHx of pulmonary hypertension, severe TR (on STANLEY 19), large PFO, Afib on coumadin, Pulmonary embolism (CTA 19), PNA in  complicated by septic multisystem organ failure requiring HD, resolved, Hemochromatosis, HTN, HLD, and who was transferred from [facility] [place] after fall with questionable syncope with right SAH. Patient states that he was drinking beers and a double screwdriver yesterday, and thinks he slipped and fell on the floor. Unclear if patient had a syncopal event or not, patient doesn't think he passed out, but can't be completely sure. Patient states that he has been experiencing dyspnea on exertion for a long time, being worked up by Pulm, CT surgery, and Cardiology. Patient  is currently resting comfortably, denies SOB at rest. Patient hasn't followed up with his Cardiologist since his recent STANLEY. Patient denies any fevers, chills, CP, LE swelling, headache, dizziness, abdominal pain, N/V/D, vision changes, unilateral weakness.     PAST MEDICAL & SURGICAL HISTORY:  ETOH abuse  Nonrheumatic aortic valve insufficiency  Pneumonia  Sepsis  Hemochromatosis  Pulmonary hypertension  Chronic atrial fibrillation  Non-rheumatic tricuspid valve insufficiency  Hyperlipidemia, unspecified hyperlipidemia type  Essential hypertension  Family history of punctured lung  History of hammer toe correction  History of bunionectomy  Laceration of neck, complicated  History of shoulder surgery  History of total right hip replacement      PREVIOUS DIAGNOSTIC TESTING:      ECHO  FINDINGS:  < from: [facility] Echo Doppler (19 @ 13:16) >  PHYSICIAN INTERPRETATION:  Left Ventricle: The left ventricular internal cavity size is normal.  Global LV systolic function was normal. Left ventricular ejection   fraction, by visual estimation, is 50 to 55%.  Right Ventricle: The right ventricular size is severely enlarged. RV   systolic function is severely reduced.  Left Atrium: There is left atrial enlargement. There is no spontaneous   echo contrast or thrombus in the left atrium or left atrial appendage.   Left atrial appendage emptying systolic velocities are normal (70 cm/s).  Right Atrium: There is no spontaneous echo contrast or thrombus in the   right atrium. 2D, color Doppler, and intravenous administration of   agitated saline demonstrated a large patent foramen ovale with continuous   right to left shunting.  Pericardium: There is no evidence of pericardial effusion. Trace fluid is   seen in the transverse sinus.  Mitral Valve: Mild leaflet thickening with mild buckling of theposterior   leaflet into the left atrium. No mitral stenosis. There is mild to   moderate mitral regurgitation. There is blunted systolic flow in the   pulmonary veins, no reversal.  Tricuspid Valve: Leaflet thickness is normal. Leaflets are tethered.   Severe tricuspid regurgitation is visualized. Peak TR gradient = 67 mmHg.  Aortic Valve: A trileaflet aortic valve is present with mildly restricted   mobility. No evidence of aortic stenosis. Mild aortic valve regurgitation   is seen. No significant aortic stenosis. Dimensionless index = 0.47.  Pulmonic Valve: The pulmonic valve is normal. Mild pulmonic valve   regurgitation.  Aorta: The visualized portions of the aortic root, ascending aorta,   aortic arch, and descending thoracic aorta are normal in size. Mild,   non-mobile atheroma in the thoracic aorta.  Pulmonary Artery: The pulmonary artery is dilated.  Other: Unable to accurately calculate Qp:Qs due to multivalvular   regurgitation.       Summary:   1. Normal global left ventricular systolic function. Left ventricular   ejection fraction, by visual estimation, is 50 to 55%.   2. Severely enlarged right ventricle.   3. Severely reduced RV systolic function.   4. Severe tricuspid regurgitation.   5. Severe pulmonary hypertension.   6. Moderate mitral regurgitation.   7. Mild aortic regurgitation.   8. Mild pulmonic insufficiency.   9. Continuous right to left shunting through a large patent foramen   ovale.  10. No intracardiac thrombus.  11. Dilated pulmonary artery.  12. Nopericardial effusion.  13. ** No prior echocardiograms available for comparison.    Miriam Zacarias DO Electronically signed on 2019 at 4:08:48 PM    < end of copied text >    CATHETERIZATION  FINDINGS:  10/2018  Normal coronary arteries  PAP 55/20  PCWP 8 mmHg      Allergies    No Known Allergies    MEDICATIONS  (STANDING):  acetaminophen   Tablet .. 650 milliGRAM(s) Oral every 6 hours  atorvastatin 10 milliGRAM(s) Oral at bedtime  chlorhexidine 2% Cloths 1 Application(s) Topical <User Schedule>  citalopram 20 milliGRAM(s) Oral daily  docusate sodium 100 milliGRAM(s) Oral every 8 hours  metoprolol tartrate 12.5 milliGRAM(s) Oral daily  pantoprazole  Injectable 40 milliGRAM(s) IV Push daily  senna 2 Tablet(s) Oral at bedtime  sodium chloride 0.9%. 1000 milliLiter(s) (75 mL/Hr) IV Continuous <Continuous>    MEDICATIONS  (PRN):  ondansetron Injectable 4 milliGRAM(s) IV Push every 6 hours PRN Nausea    Home Medications:  Advair Diskus 100 mcg-50 mcg inhalation powder: 1 puff(s) inhaled 2 times a day ()  citalopram 20 mg oral tablet: 1 tab(s) orally once a day ()  Glucosamine Chondroitin oral capsule: 3 cap(s) orally once a day ()  lovastatin 10 mg oral tablet: 1 tab(s) orally once a day ()  metoprolol: 12.5 milligram(s) orally once a day ()  Ventolin HFA 90 mcg/inh inhalation aerosol: 2 puff(s) inhaled 4 times a day, As Needed  ()  Vitamin D3 2000 intl units oral tablet: 1 tab(s) orally once a day ()  warfarin 3 mg oral tablet: 1 tab(s) orally once a day Monday-Friday ()  warfarin 4 mg oral tablet: 1 tab(s) orally once a day Saturday and  ()    FAMILY HISTORY:  Family history of diabetes mellitus  Family history of dementia  Family history of myocardial infarction      SOCIAL HISTORY: Lives with family.     CIGARETTES: Former smoker, quit 20 years ago. Smoked 1.5 ppd x 50 years    ALCOHOL: Drinks daily, at least 6 beers a day.     REVIEW OF SYSTEMS:  CONSTITUTIONAL: No fever, weight loss, or fatigue  Cardiovascular: AS PER HPI  Respiratory: AS PER HPI  Genitourinary:  No dysuria, no hematuria;   Gastrointestinal:   No dark color stool, no melena, no diarrhea, no constipation, no abdominal pain;   Neurological: No headache, no dizziness, no slurred speech;    Psychiatric: No agitation, no anxiety.    ALL OTHER REVIEW OF SYSTEMS ARE NEGATIVE.    Vital Signs Last 24 Hrs  T(C): 36.6 (2019 08:00), Max: 36.8 (2019 01:00)  T(F): 97.9 (2019 08:00), Max: 98.3 (2019 01:00)  HR: 58 (2019 11:02) (45 - 74)  BP: 93/53 (2019 11:02) (91/54 - 117/65)  BP(mean): 68 (2019 11:02) (67 - 87)  RR: 19 (2019 11:02) (14 - 27)  SpO2: 95% (2019 11:02) (92% - 98%)    Daily Height in cm: 172.72 (2019 01:00)        PHYSICAL EXAM:  Appearance: Normal, well nourished	  HEENT:   Normal oral mucosa, PERRL, EOMI, sclera non-icteric	  Lymphatic: No cervical lymphadenopathy  Cardiovascular: Bradycardia S1 S2, No JVD, III/VI holosystolic murmur lsb,, No carotid bruits, No peripheral edema  Respiratory: Decreased BS at b/l bases   Psychiatry: A & O x 3, Mood & affect appropriate  Gastrointestinal:  Soft, Non-tender, + BS, no bruits	  Skin: No rashes, No ecchymoses, No cyanosis  Neurologic: Grossly non-focal with full strength in all four extremities  Extremities: Normal range of motion, No clubbing, cyanosis or edema  Vascular: Peripheral pulses palpable 2+ bilaterally      INTERPRETATION OF TELEMETRY: SR/SB, 16 beats NSVT    ECG: NSR, extreme right axis deviation, RVH with repolarization abnormalities, NSST/T wave abnormalities     LABS:                        13.8   7.0   )-----------( 226      ( 2019 10:07 )             43.5     06-17    145  |  107  |  21.0<H>  ----------------------------<  137<H>  3.9   |  24.0  |  0.81    Ca    8.7      2019 10:07  Phos  3.1     06-  Mg     1.9     -    TPro  6.8  /  Alb  3.8  /  TBili  0.9  /  DBili  x   /  AST  51<H>  /  ALT  32  /  AlkPhos  92  06-17        PT/INR - ( 2019 00:19 )   PT: 14.2 sec;   INR: 1.23 ratio         PTT - ( 2019 00:19 )  PTT:29.6 sec  Urinalysis Basic - ( 2019 05:33 )    Color: Yellow / Appearance: Clear / S.020 / pH: x  Gluc: x / Ketone: Negative  / Bili: Negative / Urobili: 4 mg/dL   Blood: x / Protein: 30 mg/dL / Nitrite: Negative   Leuk Esterase: Trace / RBC: 0-2 /HPF / WBC 0-2   Sq Epi: x / Non Sq Epi: Occasional / Bacteria: Occasional      I&O's Summary    2019 07:  -  2019 07:00  --------------------------------------------------------  IN: 875 mL / OUT: 225 mL / NET: 650 mL    2019 07:  -  2019 12:10  --------------------------------------------------------  IN: 465 mL / OUT: 0 mL / NET: 465 mL      BNP    RADIOLOGY & ADDITIONAL STUDIES:  < from: CT Angio Chest w/ IV Cont (19 @ 08:57) >   EXAM:  CT ANGIO CHEST (W)AW IC                          PROCEDURE DATE:  2019          INTERPRETATION:  CTA chest .  COMPARISON: None.  CLINICAL INFORMATION: chest pain, dyspnea.  TECHNIQUE: Contiguous axial 1.25 mm slice thickness images ofthe chest   were obtained after intravenous contrast administration utilizing PE   protocol.  Maximum intensity projection,(MIP) 3-D,  imaging was created and   interpreted.  100 mls of Omnipaque 300 was administered intravenously without   complication and 0 mls were discarded.    FINDINGS:  Multiple RIGHT upper lobe and proximal RIGHT lower lobe proximal   segmental nonobstructing emboli noted. A LEFT pulmonary arteries patent..    The mediastinum great vessels are normal.     There are no mediastinal masses or lymphadenopathy.     The heart and pericardium are normal.      The airway is patent showing normal caliber and contour.    A RIGHT upper lobe apical area of reticular nodular fibrosis noted. There   is no pleural effusion or pneumothorax.    Visualized upper abdominal viscera unremarkable.    The bones are normal.    IMPRESSION:    A RIGHT upper lobe a RIGHT lower lobe proximal segmental nonobstructing   pulmonary emboli.    . Critical value  discussed with cardiac charge nurse Christiano, on   2019 at 8:40 AM with read back.  Hospital policies for critical values including read back   policy were followed.  The verbal communication of the critical value   supplements this written report.         < end of copied text >    < from: CT Head No Cont (19 @ 06:02) >   EXAM:  CT BRAIN                          PROCEDURE DATE:  2019          INTERPRETATION:      EXAM:  CT Head Without Contrast     CLINICAL HISTORY:  78 years old, male; Condition or disease; Other: Sah,   fall on Coumadin    TECHNIQUE:  Imaging protocol: Axial computed tomography images of the   head without contrast. Coronal and sagittal reformatted images were   created and reviewed.     COMPARISON:  No relevant prior studies available.     FINDINGS:    Small linear hyperdensity over the posterior right cortex at the   convexity appears to represent calcification. Tiny area subarachnoid   hemorrhage is considered unlikely. There are no definite intra-or   extra-axial hemorrhages or   fluid collections.  There is no mass effect or midline shift.  Ventricles   are symmetrical and mildly dilated with associated cortical volume loss   consistent with generalized atrophy.  Chronic ischemic changes in the   periventricular deep white matter.  Atherosclerotic changes within   intracranial arteries.  There are   no focal parenchymal abnormalities.  No calvarial fractures.     IMPRESSION:   Linear hyperdensity in the cortex over the right convexity likely   consistent with calcification. Tiny area of subarachnoid hemorrhage is   considered unlikely. Comparison to previous examinations would be   helpful. Generalized atrophy and chronic ischemic changes.  No definite   acute intracranial process   or intracranial hemorrhage. Consider follow-up as clinically warranted.   Preliminary report provided by [facility] BELGICA VELAZQUEZ M.D.;[facility] RADIOLOGIST   .    < end of copied text >
Patient is a 78y old  Male who presents with a chief complaint of fall/ head injury     HPI:  78 [place] [facility] [place] fall while on Coumadin for Afib. [place] [facility] [place] SAH, patient GCS 15 upon arrival. Admitted to SICU.    Primary Survey:  A: Protected, patient conversing  B: CTAB. Symmetrical chest rise  C: 2+ central (femoral) & peripheral pulses (Radial, DP)  D: GCS 15, MAEO, interacting. No andres disability noted  E: No gross deformities on primary exposure    CXR: Negative for evidence of hemo/pneumothorax    - LOC   + trauma   denied Headache     - Nausea / - Vomiting  Right hand dominant   denies new/ worsening weakness  denies new/ worsening paresthesias/ numbness   denies  new/ worsening visual changes  denies C- Spine pain, - collar/ cleared by trauma   denies T/LS  Spine pain, + - brace   denies Bowel/ Bladder dysfunction     Abnormal Nutritional Status: [] malnutrition (see nutrition note); [] underweight: BMI <19; [] morbid obesity: BMI >40; [] Cachexia  BMI 22.1      GCS: 15  Eye response (E)4  Verbal response (V)5  Motor response (M)6    traumatic SAH - Coburn & Mckeon: 1      NIHSS: total score 0  1a LOC  0=alert     1b	LOC Questions	  0 = Answers both correctly.    1c	LOC Commands	  0 = Performs both tasks correctly.    2	Gaze	  0 = Normal.  0 = No visual loss.    5a	Left Arm Motor	  0 = No drift    5b	Right Arm Motor	  0 = No drift    6a	Left Leg Motor	  0 = No drift    6b	Right Leg Motor	  0 = No drift    8	Sensory	  0 = Normal    9	Language	  0 = Normal    10    Dysarthria  0 = normal articulation     11	Neglect	  0 = Normal          PAST MEDICAL & SURGICAL HISTORY:  ETOH abuse  Nonrheumatic aortic valve insufficiency  Pneumonia  Sepsis  Hemochromatosis  Pulmonary hypertension  Chronic atrial fibrillation  Non-rheumatic tricuspid valve insufficiency  Hyperlipidemia, unspecified hyperlipidemia type  Essential hypertension  Family history of punctured lung  History of hammer toe correction  History of bunionectomy  Laceration of neck, complicated  History of shoulder surgery  History of total right hip replacement      SOCIAL HISTORY:  - EtOH/ socially,  - tobacco/ quit, - drugs    FAMILY HISTORY:  Family history of diabetes mellitus  Family history of dementia  Family history of myocardial infarction        MEDICATIONS  (STANDING):    MEDICATIONS  (PRN):    Allergies  No Known Allergies    Intolerances        PHYSICAL EXAM:  GENERAL: NAD, well-groomed, well-developed, AAOx3 and very cooperative  HEAD:  traumatic, Normocephalic, no palpable step-offs or scalp hematomas/abrasions appreciated on palpation,  EYES: b/l EOMI, PERRL, conjunctiva and sclera clear, + b/l cataracts  NECK: Supple, nontender to palpation  + FROM w no muscular soreness reported,   TS/LS: nontender to palpation midline or paraspinal muscles b/l,   NERVOUS SYSTEM:  Alert & Oriented X3, speech is clear and fluent, no dysarthria appreciated. Good concentration & very cooperative; Motor Strength 5/5 B/L upper and lower extremities, sensory is at baseline and symmetric b/l; No pronators or ankle clonus appreciated b/l, cerebellar signs grossly intact b/l. CN II-XII grossly intact b/l and symmetric;; no couch's b/l appreciated.   EXTREMITIES:  2+ Peripheral Pulses, No clubbing,  CHEST/LUNG: Clear to auscultation bilaterally;   HEART: Afib +S1 & +S2  ABDOMEN: Soft, Nontender, Nondistended;   SKIN: No rashes or lesions, b/l fungal toenails?                          14.1   8.5   )-----------( 234      ( 17 Jun 2019 00:19 )             43.9       PT/INR - ( 17 Jun 2019 00:19 )   PT: 14.2 sec;   INR: 1.23 ratio    PTT - ( 17 Jun 2019 00:19 )  PTT:29.6 sec        RADIOLOGY & ADDITIONAL STUDIES:  6/16 CT CS [facility]: no acute fx, sublexation or evidence of traumatic malalignment. Cervical spondylosis w severe canal stenosis at c3/4  and c4/5.  6/16 CTH [facility]: tiny amount of high medial parietal SAH. No IPH or calvarial Fx, chronic findings.        Time Spent with patient/ education on the floor & arranging care: 30 mins

## 2019-06-19 NOTE — PROGRESS NOTE ADULT - ASSESSMENT
Hemochromatosis: no phlebotomy needed at this time. will resume phlebotomies as outpatient    ? SAH; Brain MRI showed 4 mm acute infarct in right cerebellum. no evidence of ICH. Patient has a hisotry of afib and PE. anticoagulation when cleared by neurosurgery. neurology and cardiology input noted. if warfarin is AC of choice, this can be monitored as  outpt    severe TR: being followed by CVT    once stable, outpt f/u with Dr. Hernandez

## 2019-06-19 NOTE — PROGRESS NOTE ADULT - SUBJECTIVE AND OBJECTIVE BOX
CHIEF COMPLAINT/INTERVAL HISTORY:    Patient is a 78y old  Male who presents with a chief complaint of hemochromatosis, PE (2019 09:40)      HPI:  78 year [place] [facility] [place] witnessed mechanical fall while on Coumadin for Afib. [place] [facility] [place] right SAH, patient GCS 15 upon arrival. Admitted to SICU.      SUBJECTIVE & OBJECTIVE/ ROS: Pt seen and examined at bedside. s/p RHC. No chest pain, palpitations, light headedness/dizziness, cough, fevers/chills, abdominal pain, n/v, diarrhea/constipation, dysuria or increased urinary frequency.     ICU Vital Signs Last 24 Hrs  T(C): 36.7 (2019 12:45), Max: 36.8 (2019 16:00)  T(F): 98 (2019 12:45), Max: 98.3 (2019 16:00)  HR: 67 (2019 12:45) (50 - 81)  BP: 118/76 (2019 12:45) (85/54 - 127/76)  BP(mean): 96 (2019 12:00) (65 - 96)  ABP: --  ABP(mean): --  RR: 20 (2019 12:45) (12 - 46)  SpO2: 94% (2019 12:45) (90% - 97%)        MEDICATIONS  (STANDING):  acetaminophen   Tablet .. 650 milliGRAM(s) Oral every 6 hours  atorvastatin 10 milliGRAM(s) Oral at bedtime  chlorhexidine 2% Cloths 1 Application(s) Topical <User Schedule>  citalopram 20 milliGRAM(s) Oral daily  docusate sodium 100 milliGRAM(s) Oral every 8 hours  melatonin 3 milliGRAM(s) Oral at bedtime  metoprolol succinate ER 25 milliGRAM(s) Oral daily  pantoprazole  Injectable 40 milliGRAM(s) IV Push daily  senna 2 Tablet(s) Oral at bedtime    MEDICATIONS  (PRN):  ondansetron Injectable 4 milliGRAM(s) IV Push every 6 hours PRN Nausea      LABS:                        12.6   6.4   )-----------( 202      ( 2019 05:36 )             39.4     06-18    142  |  106  |  15.0  ----------------------------<  93  3.4<L>   |  25.0  |  0.65    Ca    8.2<L>      2019 05:36  Phos  2.9       Mg     1.8         TPro  6.8  /  Alb  3.8  /  TBili  0.9  /  DBili  x   /  AST  51<H>  /  ALT  32  /  AlkPhos  92      PT/INR - ( 2019 00:19 )   PT: 14.2 sec;   INR: 1.23 ratio         PTT - ( 2019 00:19 )  PTT:29.6 sec  Urinalysis Basic - ( 2019 05:33 )    Color: Yellow / Appearance: Clear / S.020 / pH: x  Gluc: x / Ketone: Negative  / Bili: Negative / Urobili: 4 mg/dL   Blood: x / Protein: 30 mg/dL / Nitrite: Negative   Leuk Esterase: Trace / RBC: 0-2 /HPF / WBC 0-2   Sq Epi: x / Non Sq Epi: Occasional / Bacteria: Occasional        CAPILLARY BLOOD GLUCOSE      POCT Blood Glucose.: 92 mg/dL (2019 23:59)  POCT Blood Glucose.: 98 mg/dL (2019 17:10)      RECENT CULTURES:      RADIOLOGY & ADDITIONAL TESTS:      PHYSICAL EXAM:    GENERAL: NAD, well-groomed, well-developed  HEAD:  Atraumatic, Normocephalic  EYES: EOMI, PERRLA, conjunctiva and sclera clear  ENMT: Moist mucous membranes  NECK: Supple, No JVD  NERVOUS SYSTEM:  Alert & Oriented X3, Motor Strength 5/5 B/L upper and lower extremities; DTRs 2+ intact and symmetric  CHEST/LUNG: Clear to auscultation bilaterally; No rales, rhonchi, wheezing, or rubs  HEART: Regular rate and rhythm; No murmurs, rubs, or gallops  ABDOMEN: Soft, Nontender, Nondistended; Bowel sounds present  EXTREMITIES:  2+ Peripheral Pulses, No clubbing, cyanosis, or edema CHIEF COMPLAINT/INTERVAL HISTORY:    Patient is a 78y old  Male who presents with a chief complaint of hemochromatosis, PE (2019 09:40)      HPI:  78 year old male [place] [facility] [place], admitted for witnessed mechanical fall while on Coumadin for Afib. CT head done in [facility] [place] revealed right SAH, patient GCS 15 upon arrival. Admitted to SICU.      SUBJECTIVE & OBJECTIVE/ ROS: Pt seen and examined at bedside. s/p RHC. No chest pain, palpitations, light headedness/dizziness, cough, fevers/chills, abdominal pain, n/v, diarrhea/constipation, dysuria or increased urinary frequency.     ICU Vital Signs Last 24 Hrs  T(C): 36.7 (2019 12:45), Max: 36.8 (2019 16:00)  T(F): 98 (2019 12:45), Max: 98.3 (2019 16:00)  HR: 67 (2019 12:45) (50 - 81)  BP: 118/76 (2019 12:45) (85/54 - 127/76)  BP(mean): 96 (2019 12:00) (65 - 96)  ABP: --  ABP(mean): --  RR: 20 (2019 12:45) (12 - 46)  SpO2: 94% (2019 12:45) (90% - 97%)        MEDICATIONS  (STANDING):  acetaminophen   Tablet .. 650 milliGRAM(s) Oral every 6 hours  atorvastatin 10 milliGRAM(s) Oral at bedtime  chlorhexidine 2% Cloths 1 Application(s) Topical <User Schedule>  citalopram 20 milliGRAM(s) Oral daily  docusate sodium 100 milliGRAM(s) Oral every 8 hours  melatonin 3 milliGRAM(s) Oral at bedtime  metoprolol succinate ER 25 milliGRAM(s) Oral daily  pantoprazole  Injectable 40 milliGRAM(s) IV Push daily  senna 2 Tablet(s) Oral at bedtime    MEDICATIONS  (PRN):  ondansetron Injectable 4 milliGRAM(s) IV Push every 6 hours PRN Nausea      LABS:                        12.6   6.4   )-----------( 202      ( 2019 05:36 )             39.4     06-18    142  |  106  |  15.0  ----------------------------<  93  3.4<L>   |  25.0  |  0.65    Ca    8.2<L>      2019 05:36  Phos  2.9       Mg     1.8         TPro  6.8  /  Alb  3.8  /  TBili  0.9  /  DBili  x   /  AST  51<H>  /  ALT  32  /  AlkPhos  92      PT/INR - ( 2019 00:19 )   PT: 14.2 sec;   INR: 1.23 ratio         PTT - ( 2019 00:19 )  PTT:29.6 sec  Urinalysis Basic - ( 2019 05:33 )    Color: Yellow / Appearance: Clear / S.020 / pH: x  Gluc: x / Ketone: Negative  / Bili: Negative / Urobili: 4 mg/dL   Blood: x / Protein: 30 mg/dL / Nitrite: Negative   Leuk Esterase: Trace / RBC: 0-2 /HPF / WBC 0-2   Sq Epi: x / Non Sq Epi: Occasional / Bacteria: Occasional        CAPILLARY BLOOD GLUCOSE      POCT Blood Glucose.: 92 mg/dL (2019 23:59)  POCT Blood Glucose.: 98 mg/dL (2019 17:10)      RECENT CULTURES:      RADIOLOGY & ADDITIONAL TESTS:      PHYSICAL EXAM:    GENERAL: NAD, well-groomed, well-developed  HEAD:  Atraumatic, Normocephalic  EYES: EOMI, PERRLA, conjunctiva and sclera clear  ENMT: Moist mucous membranes  NECK: Supple, No JVD  NERVOUS SYSTEM:  Alert & Oriented X3, Motor Strength 5/5 B/L upper and lower extremities; DTRs 2+ intact and symmetric  CHEST/LUNG: Clear to auscultation bilaterally; No rales, rhonchi, wheezing, or rubs  HEART: Regular rate and rhythm; No murmurs, rubs, or gallops  ABDOMEN: Soft, Nontender, Nondistended; Bowel sounds present  EXTREMITIES:  2+ Peripheral Pulses, No clubbing, cyanosis, or edema  Right groin site intact

## 2019-06-19 NOTE — CONSULT NOTE ADULT - ASSESSMENT
The patient is a 78y Male who is followed by neurology because of CVA    CVA  small right cerebellar CVA  asymptomatic  has large PFO on STANLEY with continuous right to left shunt  would consider closing this PFO as he has possible embolic CVA and recent CTA chest 5/30 showed PE  check fasting lipids    continue ASA, statin  consider anticoagulation- there is no blood on MRI and the cerebellar stroke is tiny and is not at significant risk for hemorrhagic transformation.    Alcohol overuse  Agree with WA for now not appearing to be withdrawing    will follow with you    Naman Mock MD PhD   [id number]

## 2019-06-19 NOTE — PROGRESS NOTE ADULT - ASSESSMENT
77 y/o M with a PMHx of pulmonary hypertension, severe TR (on STANLEY 06/14/19), large PFO, Afib on coumadin, Pulmonary embolism (CTA 05/30/19), PNA in 2008 complicated by septic multisystem organ failure requiring HD, resolved, Hemochromatosis, HTN, HLD, and who [place] [facility] [place] with questionable syncope with right SAH. Patient states that he was drinking beers and a double screwdriver yesterday, and thinks he slipped and fell on the floor. CT of   MRI head 4mm acute infarct   STANLEY on 06/14/19 showed EF 50-55%, severe pulmonary HTN, severely reduced RV fxn, severe TR, and + PFO  Pt presents today for RHC evaluation of PFO   Currently no neuro deficits feels well   PRE-PROCEDURE ASSESSMENT  RHC   -Patient seen and examined  -Labs reviewed  -Pre-procedure teaching completed with patient  -Informed consent witnessed  -Questions answered

## 2019-06-19 NOTE — PROGRESS NOTE ADULT - SUBJECTIVE AND OBJECTIVE BOX
RHC performed. Full report to follow.     Severe pulmonary hypertension- 80 systolic.   Wedge normal.   L to R shunt 1.8 for PFO/ASD.     Plan for medical management of pulmonary hypertension. Will plan for outpatient closure for PFO/ASD.

## 2019-06-19 NOTE — CONSULT NOTE ADULT - CONSULT REASON
6/18 MRIB W/O: Small acute infarct involving the right nickie measuring 7 mm. Extra-axial collection in the left frontal lobe measuring 8 mm which is restricting with some  GRE signal. This statistically suggest a small hemorrhage. A small empyema cannot be entirely excluded.
Severe Pulmonary HTN and RV Systolic Dysfunction
s/p fall, no LOC, found to have a tiny amount of high medial parietal SAH. No IPH or calvarial Fx, chronic findings on [facility]  + coumadin for AFib, INR 5.1 at [facility]
CVA
known to service   TR/ PFO and recent dx PE

## 2019-06-20 DIAGNOSIS — I27.20 PULMONARY HYPERTENSION, UNSPECIFIED: ICD-10-CM

## 2019-06-20 DIAGNOSIS — I63.449 CEREBRAL INFARCTION DUE TO EMBOLISM OF UNSPECIFIED CEREBELLAR ARTERY: ICD-10-CM

## 2019-06-20 LAB
ANION GAP SERPL CALC-SCNC: 12 MMOL/L — SIGNIFICANT CHANGE UP (ref 5–17)
APTT BLD: 137.4 SEC — CRITICAL HIGH (ref 27.5–36.3)
BASOPHILS # BLD AUTO: 0 K/UL — SIGNIFICANT CHANGE UP (ref 0–0.2)
BASOPHILS NFR BLD AUTO: 0.1 % — SIGNIFICANT CHANGE UP (ref 0–2)
BUN SERPL-MCNC: 12 MG/DL — SIGNIFICANT CHANGE UP (ref 8–20)
CALCIUM SERPL-MCNC: 8.8 MG/DL — SIGNIFICANT CHANGE UP (ref 8.6–10.2)
CHLORIDE SERPL-SCNC: 103 MMOL/L — SIGNIFICANT CHANGE UP (ref 98–107)
CHOLEST SERPL-MCNC: 96 MG/DL — LOW (ref 110–199)
CO2 SERPL-SCNC: 27 MMOL/L — SIGNIFICANT CHANGE UP (ref 22–29)
CREAT SERPL-MCNC: 0.6 MG/DL — SIGNIFICANT CHANGE UP (ref 0.5–1.3)
EOSINOPHIL # BLD AUTO: 0.1 K/UL — SIGNIFICANT CHANGE UP (ref 0–0.5)
EOSINOPHIL NFR BLD AUTO: 1.4 % — SIGNIFICANT CHANGE UP (ref 0–5)
GLUCOSE SERPL-MCNC: 95 MG/DL — SIGNIFICANT CHANGE UP (ref 70–115)
HAV IGM SER-ACNC: SIGNIFICANT CHANGE UP
HBA1C BLD-MCNC: 5.4 % — SIGNIFICANT CHANGE UP (ref 4–5.6)
HBV CORE IGM SER-ACNC: SIGNIFICANT CHANGE UP
HBV SURFACE AG SER-ACNC: SIGNIFICANT CHANGE UP
HCT VFR BLD CALC: 39.8 % — LOW (ref 42–52)
HCV AB S/CO SERPL IA: 0.12 S/CO — SIGNIFICANT CHANGE UP (ref 0–0.99)
HCV AB SERPL-IMP: SIGNIFICANT CHANGE UP
HDLC SERPL-MCNC: 31 MG/DL — LOW
HGB BLD-MCNC: 13.1 G/DL — LOW (ref 14–18)
INR BLD: 1.64 RATIO — HIGH (ref 0.88–1.16)
LIPID PNL WITH DIRECT LDL SERPL: 49 MG/DL — SIGNIFICANT CHANGE UP
LYMPHOCYTES # BLD AUTO: 0.8 K/UL — LOW (ref 1–4.8)
LYMPHOCYTES # BLD AUTO: 10.7 % — LOW (ref 20–55)
MAGNESIUM SERPL-MCNC: 1.6 MG/DL — SIGNIFICANT CHANGE UP (ref 1.6–2.6)
MCHC RBC-ENTMCNC: 29.7 PG — SIGNIFICANT CHANGE UP (ref 27–31)
MCHC RBC-ENTMCNC: 32.9 G/DL — SIGNIFICANT CHANGE UP (ref 32–36)
MCV RBC AUTO: 90.2 FL — SIGNIFICANT CHANGE UP (ref 80–94)
MONOCYTES # BLD AUTO: 1 K/UL — HIGH (ref 0–0.8)
MONOCYTES NFR BLD AUTO: 13.9 % — HIGH (ref 3–10)
NEUTROPHILS # BLD AUTO: 5.3 K/UL — SIGNIFICANT CHANGE UP (ref 1.8–8)
NEUTROPHILS NFR BLD AUTO: 73.6 % — HIGH (ref 37–73)
PHOSPHATE SERPL-MCNC: 4.1 MG/DL — SIGNIFICANT CHANGE UP (ref 2.4–4.7)
PLATELET # BLD AUTO: 230 K/UL — SIGNIFICANT CHANGE UP (ref 150–400)
POTASSIUM SERPL-MCNC: 3.5 MMOL/L — SIGNIFICANT CHANGE UP (ref 3.5–5.3)
POTASSIUM SERPL-SCNC: 3.5 MMOL/L — SIGNIFICANT CHANGE UP (ref 3.5–5.3)
PROTHROM AB SERPL-ACNC: 19.2 SEC — HIGH (ref 10–12.9)
RBC # BLD: 4.41 M/UL — LOW (ref 4.6–6.2)
RBC # FLD: 16.6 % — HIGH (ref 11–15.6)
SODIUM SERPL-SCNC: 142 MMOL/L — SIGNIFICANT CHANGE UP (ref 135–145)
TOTAL CHOLESTEROL/HDL RATIO MEASUREMENT: 3 RATIO — LOW (ref 3.4–9.6)
TRIGL SERPL-MCNC: 81 MG/DL — SIGNIFICANT CHANGE UP (ref 10–200)
WBC # BLD: 7.2 K/UL — SIGNIFICANT CHANGE UP (ref 4.8–10.8)
WBC # FLD AUTO: 7.2 K/UL — SIGNIFICANT CHANGE UP (ref 4.8–10.8)

## 2019-06-20 PROCEDURE — 99232 SBSQ HOSP IP/OBS MODERATE 35: CPT

## 2019-06-20 PROCEDURE — 99232 SBSQ HOSP IP/OBS MODERATE 35: CPT | Mod: 24

## 2019-06-20 PROCEDURE — 74177 CT ABD & PELVIS W/CONTRAST: CPT | Mod: 26

## 2019-06-20 PROCEDURE — 93880 EXTRACRANIAL BILAT STUDY: CPT | Mod: 26

## 2019-06-20 RX ORDER — WARFARIN SODIUM 2.5 MG/1
5 TABLET ORAL ONCE
Refills: 0 | Status: COMPLETED | OUTPATIENT
Start: 2019-06-20 | End: 2019-06-20

## 2019-06-20 RX ORDER — ENOXAPARIN SODIUM 100 MG/ML
65 INJECTION SUBCUTANEOUS EVERY 12 HOURS
Refills: 0 | Status: DISCONTINUED | OUTPATIENT
Start: 2019-06-20 | End: 2019-06-21

## 2019-06-20 RX ADMIN — ATORVASTATIN CALCIUM 40 MILLIGRAM(S): 80 TABLET, FILM COATED ORAL at 21:16

## 2019-06-20 RX ADMIN — Medication 81 MILLIGRAM(S): at 09:45

## 2019-06-20 RX ADMIN — Medication 1 MILLIGRAM(S): at 09:45

## 2019-06-20 RX ADMIN — Medication 25 MILLIGRAM(S): at 05:56

## 2019-06-20 RX ADMIN — ENOXAPARIN SODIUM 65 MILLIGRAM(S): 100 INJECTION SUBCUTANEOUS at 21:17

## 2019-06-20 RX ADMIN — PANTOPRAZOLE SODIUM 40 MILLIGRAM(S): 20 TABLET, DELAYED RELEASE ORAL at 09:45

## 2019-06-20 RX ADMIN — Medication 650 MILLIGRAM(S): at 21:18

## 2019-06-20 RX ADMIN — WARFARIN SODIUM 5 MILLIGRAM(S): 2.5 TABLET ORAL at 21:20

## 2019-06-20 RX ADMIN — ENOXAPARIN SODIUM 70 MILLIGRAM(S): 100 INJECTION SUBCUTANEOUS at 09:46

## 2019-06-20 RX ADMIN — Medication 650 MILLIGRAM(S): at 21:48

## 2019-06-20 RX ADMIN — RIOCIGUAT 0.5 MILLIGRAM(S): 1.5 TABLET, FILM COATED ORAL at 06:52

## 2019-06-20 RX ADMIN — Medication 100 MILLIGRAM(S): at 09:45

## 2019-06-20 RX ADMIN — RIOCIGUAT 0.5 MILLIGRAM(S): 1.5 TABLET, FILM COATED ORAL at 21:20

## 2019-06-20 RX ADMIN — Medication 3 MILLIGRAM(S): at 21:24

## 2019-06-20 RX ADMIN — CITALOPRAM 20 MILLIGRAM(S): 10 TABLET, FILM COATED ORAL at 09:46

## 2019-06-20 RX ADMIN — RIOCIGUAT 0.5 MILLIGRAM(S): 1.5 TABLET, FILM COATED ORAL at 13:19

## 2019-06-20 RX ADMIN — CHLORHEXIDINE GLUCONATE 1 APPLICATION(S): 213 SOLUTION TOPICAL at 09:41

## 2019-06-20 NOTE — PROGRESS NOTE ADULT - SUBJECTIVE AND OBJECTIVE BOX
INTERVAL HPI/OVERNIGHT EVENTS:    Patient is a 78 year old male with a past medical history of afib on Coumadin, severe tricuspid regurgitation, large PFO, PE 5/30/19, PNA in 2008 complicated by sepsis and multisystem organ failure requiring HD- resolved, hemochromatosis, HTN, HLD who [place] [facility] [place] s/p questionable syncope and fall found with possible R SAH on CT head. Repeat CT head on arrival read as likely calcification.     Patient seen and examined earlier this morning. No acute overnight events reported. Patient denies headache, dizziness, weakness, paresthesias, vision changes.    Vital Signs Last 24 Hrs  T(C): 36.6 (06-20-19 @ 05:58), Max: 36.9 (06-19-19 @ 15:56)  T(F): 97.8 (06-20-19 @ 05:58), Max: 98.5 (06-19-19 @ 15:56)  HR: 70 (06-20-19 @ 05:58) (64 - 74)  BP: 96/60 (06-20-19 @ 05:58) (96/60 - 127/75)  RR: 22 (06-20-19 @ 09:27) (18 - 22)  SpO2: 84% (06-20-19 @ 09:27) (84% - 97%)    PHYSICAL EXAM:  GENERAL: NAD, well-groomed, well-developed  MAL COMA SCORE: E- 4 V- 5 M- 6 =15  MENTAL STATUS: Awake, alert, oriented to self, SSH, full date. Appropriately conversant without aphasia; following commands  CRANIAL NERVES: Pupils 3mm and reactive bilaterally. Face symmetric. Hearing grossly intact. Speech clear and fluent.  MOTOR: strength 5/5 b/l upper and lower extremities, no drift, no dysmetria  SENSATION: grossly intact to light touch all extremities  CHEST/LUNG: Resting comfortably in bed with nonlabored breathing, no respiratory distress   HEART: Regular rate    LABS:                                   13.1   7.2   )-----------( 230      ( 20 Jun 2019 06:18 )             39.8     06-20    142  |  103  |  12.0  ----------------------------<  95  3.5   |  27.0  |  0.60    Ca    8.8      20 Jun 2019 06:18  Phos  4.1     06-20  Mg     1.6     06-20    TPro  5.8<L>  /  Alb  3.3  /  TBili  1.4  /  DBili  0.4<H>  /  AST  27  /  ALT  20  /  AlkPhos  72  06-19        RADIOLOGY & ADDITIONAL TESTS:  US Duplex Venous Lower Ext Complete, Bilateral (06.17.19 @ 18:07)  IMPRESSION:   Unremarkable ultrasound of the bilateral lower extremity   deep venous system.        CT Head No Cont (06.17.19 @ 06:02)  IMPRESSION:   Linear hyperdensity in the cortex over the right convexity likely   consistent with calcification. Tiny area of subarachnoid hemorrhage is   considered unlikely. Comparison to previous examinations would be   helpful. Generalized atrophy and chronic ischemic changes.  No definite   acute intracranial process   or intracranial hemorrhage. Consider follow-up as clinically warranted.   Preliminary report provided by [facility] BELGICA VELAZQUEZ M.D.;[facility] RADIOLOGIST     MR Head No Cont (06.18.19 @ 22:45)  IMPRESSION:   4 mm acute infarct right cerebellum.  TAYLOR SCHULTZ         MEDICATIONS  (STANDING):  acetaminophen   Tablet .. 650 milliGRAM(s) Oral every 6 hours  aspirin  chewable 81 milliGRAM(s) Oral daily  atorvastatin 40 milliGRAM(s) Oral at bedtime  chlorhexidine 2% Cloths 1 Application(s) Topical <User Schedule>  citalopram 20 milliGRAM(s) Oral daily  docusate sodium 100 milliGRAM(s) Oral every 8 hours  enoxaparin Injectable 70 milliGRAM(s) SubCutaneous every 12 hours  folic acid 1 milliGRAM(s) Oral daily  melatonin 3 milliGRAM(s) Oral at bedtime  metoprolol succinate ER 25 milliGRAM(s) Oral daily  pantoprazole  Injectable 40 milliGRAM(s) IV Push daily  riociguat 0.5 milliGRAM(s) Oral three times a day  senna 2 Tablet(s) Oral at bedtime  thiamine 100 milliGRAM(s) Oral daily  warfarin 5 milliGRAM(s) Oral once    MEDICATIONS  (PRN):  LORazepam   Injectable 2 milliGRAM(s) IV Push every 2 hours PRN CIWA-Ar score increase by 2 points and a total score of 7 or less  ondansetron Injectable 4 milliGRAM(s) IV Push every 6 hours PRN Nausea

## 2019-06-20 NOTE — PROGRESS NOTE ADULT - SUBJECTIVE AND OBJECTIVE BOX
Patient doing well. Slept well.  Has more tests today and is mildly irritable, but pleasantly.   Reports no pain and is looking to go home.     FUNCTIONAL PROGRESS  6/20  Bed Mobility: Rolling/Turning:     · Level of Cotton	independent	    Bed Mobility: Scooting/Bridging:     · Level of Cotton	independent	    Bed Mobility: Sit to Supine:     · Level of Cotton	supervision	  		  · Physical Assist/Nonphysical Assist	verbal cues	  		   		    Bed Mobility: Supine to Sit:     · Level of Cotton	supervision	  		  · Physical Assist/Nonphysical Assist	verbal cues	  		   		    Bed Mobility Analysis:     · Bed Mobility Limitations	decreased ability to use arms for pushing/pulling	  · Impairments Contributing to Impaired Bed Mobility	decreased strength; decreased flexibility	    Transfer: Sit to Stand:     · Level of Cotton	contact guard	  · Physical Assist/Nonphysical Assist	verbal cues; nonverbal cues (demo/gestures)	  · Weight-Bearing Restrictions	full weight-bearing	  · Assistive Device	hand held assist	    Transfer: Stand to Sit:     · Level of Cotton	contact guard	  · Physical Assist/Nonphysical Assist	verbal cues; nonverbal cues (demo/gestures)	  · Weight-Bearing Restrictions	full weight-bearing	  · Assistive Device	hand held assist	    Sit/Stand Transfer Safety Analysis:     · Transfer Safety Concerns Noted	decreased weight-shifting ability; in sagittal plane	  · Impairments Contributing to Impaired Transfers	decreased strength; decreased activity tolerance; impaired coordination; decreased flexibility; impaired balance	    Gait Skills:     · Level of Cotton	minimum assist (75% patients effort)	  · Physical Assist/Nonphysical Assist	verbal cues; nonverbal cues (demo/gestures)	  · Weight-Bearing Restrictions	full weight-bearing	  · Assistive Device	hand held assist	  · Gait Distance	5 feet; times 2	    Bathing Training:     · Level of Cotton	supervision; sponge	  · Physical Assist/Nonphysical Assist	set-up required; verbal cues	    Upper Body Dressing Training:     · Level of Cotton	supervision; gown	  · Physical Assist/Nonphysical Assist	set-up required; verbal cues	    Lower Body Dressing Training:     · Level of Cotton	minimum assist (75% patients effort); socks in sitting via LE crossing	  · Physical Assist/Nonphysical Assist	1 person assist; nonverbal cues (demo/gestures); verbal cues	    Toilet Hygiene Training:     · Level of Cotton	contact guard; +urination on commode during evaluation; pt performed hygiene in sitting	  · Physical Assist/Nonphysical Assist	set-up required; verbal cues	    Grooming Training:     · Level of Cotton	supervision; to wash hands in sitting	  · Physical Assist/Nonphysical Assist	set-up required; verbal cues	    Eating/Self-Feeding Training:     · Level of Cotton	not observed	        REVIEW OF SYSTEMS  Constitutional - No fever,  No fatigue  HEENT - No vertigo, No neck pain  Neurological - No headaches, No memory loss, No loss of strength, No numbness, No tremors  Skin - No rashes, +lesions   Musculoskeletal - No joint pain, No joint swelling, No muscle pain  Psychiatric - No depression, No anxiety    VITALS  T(C): 36.6 (06-20-19 @ 05:58), Max: 36.9 (06-19-19 @ 15:56)  HR: 70 (06-20-19 @ 05:58) (70 - 74)  BP: 96/60 (06-20-19 @ 05:58) (96/60 - 114/72)  RR: 22 (06-20-19 @ 09:27) (18 - 22)  SpO2: 84% (06-20-19 @ 09:27) (84% - 92%)  Wt(kg): --    MEDICATIONS   acetaminophen   Tablet .. 650 milliGRAM(s) every 6 hours  aspirin  chewable 81 milliGRAM(s) daily  atorvastatin 40 milliGRAM(s) at bedtime  chlorhexidine 2% Cloths 1 Application(s) <User Schedule>  citalopram 20 milliGRAM(s) daily  docusate sodium 100 milliGRAM(s) every 8 hours  enoxaparin Injectable 70 milliGRAM(s) every 12 hours  folic acid 1 milliGRAM(s) daily  LORazepam   Injectable 2 milliGRAM(s) every 2 hours PRN  melatonin 3 milliGRAM(s) at bedtime  metoprolol succinate ER 25 milliGRAM(s) daily  ondansetron Injectable 4 milliGRAM(s) every 6 hours PRN  pantoprazole  Injectable 40 milliGRAM(s) daily  riociguat 0.5 milliGRAM(s) three times a day  senna 2 Tablet(s) at bedtime  thiamine 100 milliGRAM(s) daily  warfarin 5 milliGRAM(s) once      RECENT LABS - Reviewed                        13.1   7.2   )-----------( 230      ( 20 Jun 2019 06:18 )             39.8     06-20    142  |  103  |  12.0  ----------------------------<  95  3.5   |  27.0  |  0.60    Ca    8.8      20 Jun 2019 06:18  Phos  4.1     06-20  Mg     1.6     06-20    TPro  5.8<L>  /  Alb  3.3  /  TBili  1.4  /  DBili  0.4<H>  /  AST  27  /  ALT  20  /  AlkPhos  72  06-19              ----------------------------------------------------------------------------------------  PHYSICAL EXAM  Constitutional - NAD, Comfortable  Extremities - No edema  Neurologic Exam -                    Motor - No focal deficits     Sensory - Intact to LT  Psychiatric - Mood stable, Affect WNL  ----------------------------------------------------------------------------------------  ASSESSMENT/PLAN  78yMale with functional deficits after an EtOH related fall sustaining a SAH and acute CVA  Acute CVA - ASA, Lipitor  AFIB - Coumadin + Lovenox [place]  EtOH - CIWA, Ativan, Folate, Thiamine  Mood - Celexa 	  Pain - Tylenol  Constipation - Coalce, Senna  DVT PPX - SCDs  Rehab - Continue to recommend DC HOME. Continue bedside therapy as well as OOB throughout the day with mobilization by staff to maintain cardiopulmonary function and prevention of secondary complications related to debility.

## 2019-06-20 NOTE — PHYSICAL THERAPY INITIAL EVALUATION ADULT - IMPAIRMENTS CONTRIBUTING TO GAIT DEVIATIONS, PT EVAL
impaired balance/decreased strength/decreased activity tolerance
decreased flexibility/impaired coordination/decreased activity tolerance/impaired balance/narrow base of support/decreased strength

## 2019-06-20 NOTE — PHYSICAL THERAPY INITIAL EVALUATION ADULT - ADDITIONAL COMMENTS
as per pt.: lives in the private house with wife, 4 steps to enter (+) rail, (-) DME, (+) driving, wife available to assist pt. as needed upon D/C home
as per pt.: lives in the private house with wife, 4 steps to enter (+) rail, (-) DME, (+) driving, wife available to assist pt. as needed upon D/C home

## 2019-06-20 NOTE — PROGRESS NOTE ADULT - ATTENDING COMMENTS
NSGY Attg:    see above    patient seen and examined    exam nonfocal    MRI brain w and wo contrast pending NSGY Attg:    see above    patient seen and examined by PA staff    exam nonfocal    MRI brain reviewed -- no evidence of ICH    agree with plan as above; recall prn

## 2019-06-20 NOTE — PHYSICAL THERAPY INITIAL EVALUATION ADULT - GENERAL OBSERVATIONS, REHAB EVAL
Pt. received in the semi hollingsworth position, NAD
Pt. received in the semi hollingsworth position, NAD.

## 2019-06-20 NOTE — PROGRESS NOTE ADULT - SUBJECTIVE AND OBJECTIVE BOX
78 year old male being followed by Dr. Miller at [facility] for hemochromatosis. The patient had a liver MRI in August 2018 and was was found to have radiographic evidence of hepatic iron overload. On that scan, there was not mention of cirrhosis or hepatoma. The patient has been on a phleb program every 3 months. His last ferritin was 53.     The patient has a hisotry of alchohol absuse and valvular insufficiency    [place] [facility] [place] mechanical fall while on Coumadin for Afib and history of PE in 5/19. CT [place] [facility] [place] right SAH, patient GCS 15 upon arrival. Ct scan here suggests possible calcification. Brain MRI showed 4 mm acute infarct in right cerebellum.  Patient seen, denies headache. Feels well and offers no specific complaints.           PAST MEDICAL & SURGICAL HISTORY:  ETOH abuse  Nonrheumatic aortic valve insufficiency  Pneumonia  Sepsis  Hemochromatosis  Pulmonary hypertension  Chronic atrial fibrillation  Non-rheumatic tricuspid valve insufficiency  Hyperlipidemia, unspecified hyperlipidemia type  Essential hypertension  Family history of punctured lung  History of hammer toe correction  History of bunionectomy  Laceration of neck, complicated  History of shoulder surgery  History of total right hip replacement      ROS:  Negative except for:    MEDICATIONS  (STANDING):  acetaminophen   Tablet .. 650 milliGRAM(s) Oral every 6 hours  aspirin  chewable 81 milliGRAM(s) Oral daily  atorvastatin 40 milliGRAM(s) Oral at bedtime  chlorhexidine 2% Cloths 1 Application(s) Topical <User Schedule>  citalopram 20 milliGRAM(s) Oral daily  docusate sodium 100 milliGRAM(s) Oral every 8 hours  enoxaparin Injectable 70 milliGRAM(s) SubCutaneous every 12 hours  folic acid 1 milliGRAM(s) Oral daily  melatonin 3 milliGRAM(s) Oral at bedtime  metoprolol succinate ER 25 milliGRAM(s) Oral daily  pantoprazole  Injectable 40 milliGRAM(s) IV Push daily  riociguat 0.5 milliGRAM(s) Oral three times a day  senna 2 Tablet(s) Oral at bedtime  thiamine 100 milliGRAM(s) Oral daily    MEDICATIONS  (PRN):  LORazepam   Injectable 2 milliGRAM(s) IV Push every 2 hours PRN CIWA-Ar score increase by 2 points and a total score of 7 or less  ondansetron Injectable 4 milliGRAM(s) IV Push every 6 hours PRN Nausea      Allergies    No Known Allergies    Intolerances        Vital Signs Last 24 Hrs  T(C): 36.9 (19 Jun 2019 15:56), Max: 36.9 (19 Jun 2019 15:56)  T(F): 98.5 (19 Jun 2019 15:56), Max: 98.5 (19 Jun 2019 15:56)  HR: 73 (19 Jun 2019 15:56) (63 - 73)  BP: 114/72 (19 Jun 2019 15:56) (108/60 - 128/69)  BP(mean): --  RR: 18 (19 Jun 2019 15:56) (18 - 20)  SpO2: 90% (19 Jun 2019 15:56) (90% - 97%)    PHYSICAL EXAM  General: adult in NAD  HEENT: clear oropharynx, anicteric sclera, pink conjunctiva  Neck: supple  CV: normal S1/S2 with no murmur rubs or gallops  Lungs: clear  Abdomen: soft non-tender non-distended, no hepatosplenomegaly  Ext: no clubbing cyanosis or edema      LABS:         6/20/19:  WBC 7.2  H/H 13.1/39.8, plt ct 230,000                 12.8   5.8   )-----------( 225      ( 19 Jun 2019 06:25 )             39.8     Serial CBC's  06-19 @ 06:25  Hct-39.8 / Hgb-12.8 / Plat-225 / RBC-4.37 / WBC-5.8          Serial CBC's  06-18 @ 05:36  Hct-39.4 / Hgb-12.6 / Plat-202 / RBC-4.33 / WBC-6.4            06-19    141  |  103  |  10.0  ----------------------------<  81  3.8   |  25.0  |  0.57    Ca    9.1      19 Jun 2019 06:25  Phos  3.5     06-19  Mg     2.2     06-19    TPro  5.8<L>  /  Alb  3.3  /  TBili  1.4  /  DBili  0.4<H>  /  AST  27  /  ALT  20  /  AlkPhos  72  06-19          Platelet Count - Automated: 225 K/uL (06-19 @ 06:25)  Hemoglobin: 12.8 g/dL (06-19 @ 06:25)            RADIOLOGY & ADDITIONAL STUDIES:

## 2019-06-20 NOTE — PHYSICAL THERAPY INITIAL EVALUATION ADULT - DIAGNOSIS, PT EVAL
Impaired Functional Mobility due to pulmonary embolism and A-fib on coumadin, s/p fall found to have R SAH.
Impaired Functional Mobility due to new acute cerebella infarct (6/18), pulmonary embolism and A-fib on coumadin, s/p fall found to have R SAH.

## 2019-06-20 NOTE — PROGRESS NOTE ADULT - ASSESSMENT
Hemochromatosis: no phlebotomy needed at this time. will resume phlebotomies as outpatient.  CBC stable    ? SAH; Brain MRI showed 4 mm acute infarct in right cerebellum. no evidence of ICH. Patient has a hisotry of afib and PE. anticoagulation when cleared by neurosurgery. neurology and cardiology input noted. if warfarin is AC of choice, this can be monitored as  outpt    severe TR: being followed by CVT    once stable, outpt f/u with Dr. Hernandez

## 2019-06-20 NOTE — PHYSICAL THERAPY INITIAL EVALUATION ADULT - ACTIVE RANGE OF MOTION EXAMINATION, REHAB EVAL
bilateral upper extremity Active ROM was WFL (within functional limits)/bilateral  lower extremity Active ROM was WFL (within functional limits)/assessed during functional mobility, resistance not applied
assessed during functional mobility, resistance not applied/bilateral upper extremity Active ROM was WFL (within functional limits)/bilateral  lower extremity Active ROM was WFL (within functional limits)

## 2019-06-20 NOTE — PROGRESS NOTE ADULT - SUBJECTIVE AND OBJECTIVE BOX
CHIEF COMPLAINT/INTERVAL HISTORY:    Patient is a 78y old  Male who presents with a chief complaint of hemochromatosis, PE (2019 09:40)      HPI:  78 year old male transferred from [facility] [place], admitted for witnessed mechanical fall while on Coumadin for Afib. CT head done in [facility] revealed right SAH, patient GCS 15 upon arrival. Admitted to SICU.      SUBJECTIVE & OBJECTIVE/ ROS: Pt seen and examined at bedside. No chest pain, palpitations, light headedness/dizziness, cough, fevers/chills, abdominal pain, n/v, diarrhea/constipation, dysuria or increased urinary frequency.     ICU Vital Signs Last 24 Hrs  T(C): 36.7 (2019 12:45), Max: 36.8 (2019 16:00)  T(F): 98 (2019 12:45), Max: 98.3 (2019 16:00)  HR: 67 (2019 12:45) (50 - 81)  BP: 118/76 (2019 12:45) (85/54 - 127/76)  BP(mean): 96 (2019 12:00) (65 - 96)  ABP: --  ABP(mean): --  RR: 20 (2019 12:45) (12 - 46)  SpO2: 94% (2019 12:45) (90% - 97%)        MEDICATIONS  (STANDING):  acetaminophen   Tablet .. 650 milliGRAM(s) Oral every 6 hours  atorvastatin 10 milliGRAM(s) Oral at bedtime  chlorhexidine 2% Cloths 1 Application(s) Topical <User Schedule>  citalopram 20 milliGRAM(s) Oral daily  docusate sodium 100 milliGRAM(s) Oral every 8 hours  melatonin 3 milliGRAM(s) Oral at bedtime  metoprolol succinate ER 25 milliGRAM(s) Oral daily  pantoprazole  Injectable 40 milliGRAM(s) IV Push daily  senna 2 Tablet(s) Oral at bedtime    MEDICATIONS  (PRN):  ondansetron Injectable 4 milliGRAM(s) IV Push every 6 hours PRN Nausea      LABS:                        12.6   6.4   )-----------( 202      ( 2019 05:36 )             39.4     06-18    142  |  106  |  15.0  ----------------------------<  93  3.4<L>   |  25.0  |  0.65    Ca    8.2<L>      2019 05:36  Phos  2.9     -  Mg     1.8     -18    TPro  6.8  /  Alb  3.8  /  TBili  0.9  /  DBili  x   /  AST  51<H>  /  ALT  32  /  AlkPhos  92  06-17    PT/INR - ( 2019 00:19 )   PT: 14.2 sec;   INR: 1.23 ratio         PTT - ( 2019 00:19 )  PTT:29.6 sec  Urinalysis Basic - ( 2019 05:33 )    Color: Yellow / Appearance: Clear / S.020 / pH: x  Gluc: x / Ketone: Negative  / Bili: Negative / Urobili: 4 mg/dL   Blood: x / Protein: 30 mg/dL / Nitrite: Negative   Leuk Esterase: Trace / RBC: 0-2 /HPF / WBC 0-2   Sq Epi: x / Non Sq Epi: Occasional / Bacteria: Occasional        CAPILLARY BLOOD GLUCOSE      POCT Blood Glucose.: 92 mg/dL (2019 23:59)  POCT Blood Glucose.: 98 mg/dL (2019 17:10)      RECENT CULTURES:      RADIOLOGY & ADDITIONAL TESTS:      PHYSICAL EXAM:    GENERAL: NAD, well-groomed, well-developed  HEAD:  Atraumatic, Normocephalic  EYES: EOMI, PERRLA, conjunctiva and sclera clear  ENMT: Moist mucous membranes  NECK: Supple, No JVD  NERVOUS SYSTEM:  Alert & Oriented X3, Motor Strength 5/5 B/L upper and lower extremities; DTRs 2+ intact and symmetric  CHEST/LUNG: Clear to auscultation bilaterally; No rales, rhonchi, wheezing, or rubs  HEART: Regular rate and rhythm; No murmurs, rubs, or gallops  ABDOMEN: Soft, Nontender, Nondistended; Bowel sounds present  EXTREMITIES:  2+ Peripheral Pulses, No clubbing, cyanosis, or edema  Right groin site intact

## 2019-06-20 NOTE — PROGRESS NOTE ADULT - SUBJECTIVE AND OBJECTIVE BOX
SUBJECTIVE:  Cardiology NP F/U note:  SP: RHC which revealed: severe pulm HTN / left to right shunt/ PFO/ASD   denies complaints of chest pain/sob/dizziness/palps overnight   aurelia diet/ OOB       	  MEDICATIONS  (STANDING):  acetaminophen   Tablet .. 650 milliGRAM(s) Oral every 6 hours  aspirin  chewable 81 milliGRAM(s) Oral daily  atorvastatin 40 milliGRAM(s) Oral at bedtime  chlorhexidine 2% Cloths 1 Application(s) Topical <User Schedule>  citalopram 20 milliGRAM(s) Oral daily  docusate sodium 100 milliGRAM(s) Oral every 8 hours  enoxaparin Injectable 70 milliGRAM(s) SubCutaneous every 12 hours  folic acid 1 milliGRAM(s) Oral daily  melatonin 3 milliGRAM(s) Oral at bedtime  metoprolol succinate ER 25 milliGRAM(s) Oral daily  pantoprazole  Injectable 40 milliGRAM(s) IV Push daily  riociguat 0.5 milliGRAM(s) Oral three times a day  senna 2 Tablet(s) Oral at bedtime  thiamine 100 milliGRAM(s) Oral daily    MEDICATIONS  (PRN):  LORazepam   Injectable 2 milliGRAM(s) IV Push every 2 hours PRN CIWA-Ar score increase by 2 points and a total score of 7 or less  ondansetron Injectable 4 milliGRAM(s) IV Push every 6 hours PRN Nausea      PHYSICAL EXAM:    T(C): 36.6 (19 @ 05:58), Max: 36.9 (19 @ 15:56)  HR: 70 (19 @ 05:58) (64 - 74)  BP: 96/60 (19 @ 05:58) (96/60 - 128/69)  RR: 18 (19 @ 05:58) (18 - 20)  SpO2: 90% (19 @ 05:58) (90% - 97%)  Wt(kg): --    I&O's Summary    2019 07:01  -  2019 07:00  --------------------------------------------------------  IN: 480 mL / OUT: 700 mL / NET: -220 mL        Daily     Daily Weight in k.5 (2019 04:28)    Appearance: NAD	  Cardiovascular: Normal S1 S2, RRR 70's  No JVD, No murmurs, No edema  Respiratory: Lungs clear to auscultation	  Psychiatry: A & O x 3, Mood & affect appropriate  Gastrointestinal:  Soft, Non-tender, + BS	  Skin: warm and dry  Neurologic: Non-focal  Extremities: Normal range of motion,:  Right Groin soft /no hematoma  dressing removed  Vascular: Peripheral pulses palpable 2+ bilaterally    TELEMETRY: RSR 70's / PVCs no events 	  	  RADIOLOGY:   DIAGNOSTIC TESTING:  [X ] Echocardiogram:  < from: TTE Echo w/Cont Complete (19 @ 10:28) >  Left ventricular ejection fraction, by visual estimation, is 40 to  45%.   2. Mildly decreased global left ventricular systolic function.   3. Spectral Doppler shows impaired relaxation pattern of left   ventricular myocardial filling (Grade I diastolic dysfunction).   4. Severely enlarged right ventricle.   5. Severely reduced RV systolic function.   6. There is no evidence of pericardial effusion.   7. Mild mitral annular calcification.   8. Thickening of the anterior and posterior mitral valve leaflets.   9. Moderate-severe tricuspid regurgitation.  10. Moderate aortic regurgitation.  11. Moderate to severe pulmonic valve regurgitation.  12. Estimated pulmonary artery systolic pressure is 73.7 mmHg assuming a   right atrial pressure of 15 mmHg, which is consistent with severe   pulmonary hypertension.  13. Mildly dilated pulmonary artery.  14. Endocardial visualization was enhanced with intravenous echo contrast.      [ ]  Catheterization: official pending : severe pulm HTN / left to right shunt/ PFO/ASD   	                                  13.1   7.2   )-----------( 230      ( 2019 06:18 )             39.8     06-20    142  |  103  |  12.0  ----------------------------<  95  3.5   |  27.0  |  0.60    Ca    8.8      2019 06:18  Phos  4.1     06-20  Mg     1.6     06-20    TPro  5.8<L>  /  Alb  3.3  /  TBili  1.4  /  DBili  0.4<H>  /  AST  27  /  ALT  20  /  AlkPhos  72  06-19    proBNP:   Lipid Profile:   HgA1c: Hemoglobin A1C, Whole Blood: 5.4 % ( @ 06:18)      ASSESSMENT:      Plan: SUBJECTIVE:  Cardiology NP F/U note:  SP: RHC which revealed: severe pulm HTN / left to right shunt/ PFO/ASD   denies complaints of chest pain/sob/dizziness/palps overnight   aurelia diet/ OOB       	  MEDICATIONS  (STANDING):  acetaminophen   Tablet .. 650 milliGRAM(s) Oral every 6 hours  aspirin  chewable 81 milliGRAM(s) Oral daily  atorvastatin 40 milliGRAM(s) Oral at bedtime  chlorhexidine 2% Cloths 1 Application(s) Topical <User Schedule>  citalopram 20 milliGRAM(s) Oral daily  docusate sodium 100 milliGRAM(s) Oral every 8 hours  enoxaparin Injectable 70 milliGRAM(s) SubCutaneous every 12 hours  folic acid 1 milliGRAM(s) Oral daily  melatonin 3 milliGRAM(s) Oral at bedtime  metoprolol succinate ER 25 milliGRAM(s) Oral daily  pantoprazole  Injectable 40 milliGRAM(s) IV Push daily  riociguat 0.5 milliGRAM(s) Oral three times a day  senna 2 Tablet(s) Oral at bedtime  thiamine 100 milliGRAM(s) Oral daily    MEDICATIONS  (PRN):  LORazepam   Injectable 2 milliGRAM(s) IV Push every 2 hours PRN CIWA-Ar score increase by 2 points and a total score of 7 or less  ondansetron Injectable 4 milliGRAM(s) IV Push every 6 hours PRN Nausea      PHYSICAL EXAM:    T(C): 36.6 (19 @ 05:58), Max: 36.9 (19 @ 15:56)  HR: 70 (19 @ 05:58) (64 - 74)  BP: 96/60 (19 @ 05:58) (96/60 - 128/69)  RR: 18 (19 @ 05:58) (18 - 20)  SpO2: 90% (19 @ 05:58) (90% - 97%)  Wt(kg): --    I&O's Summary    2019 07:01  -  2019 07:00  --------------------------------------------------------  IN: 480 mL / OUT: 700 mL / NET: -220 mL        Daily     Daily Weight in k.5 (2019 04:28)    Appearance: NAD	  Cardiovascular: Normal S1 S2, RRR 70's  No JVD, No murmurs, No edema  Respiratory: Lungs clear to auscultation	  Psychiatry: A & O x 3, Mood & affect appropriate  Gastrointestinal:  Soft, Non-tender, + BS	  Skin: warm and dry  Neurologic: Non-focal/ no deficits.   Extremities: Normal range of motion,:  Right Groin soft /no hematoma  dressing removed  Vascular: Peripheral pulses palpable 2+ bilaterally    TELEMETRY: RSR 70's / PVCs no events 	  	  RADIOLOGY:   DIAGNOSTIC TESTING:  [X ] Echocardiogram:  < from: TTE Echo w/Cont Complete (19 @ 10:28) >  Left ventricular ejection fraction, by visual estimation, is 40 to  45%.   2. Mildly decreased global left ventricular systolic function.   3. Spectral Doppler shows impaired relaxation pattern of left   ventricular myocardial filling (Grade I diastolic dysfunction).   4. Severely enlarged right ventricle.   5. Severely reduced RV systolic function.   6. There is no evidence of pericardial effusion.   7. Mild mitral annular calcification.   8. Thickening of the anterior and posterior mitral valve leaflets.   9. Moderate-severe tricuspid regurgitation.  10. Moderate aortic regurgitation.  11. Moderate to severe pulmonic valve regurgitation.  12. Estimated pulmonary artery systolic pressure is 73.7 mmHg assuming a   right atrial pressure of 15 mmHg, which is consistent with severe   pulmonary hypertension.  13. Mildly dilated pulmonary artery.  14. Endocardial visualization was enhanced with intravenous echo contrast.      [ ]  Catheterization: official pending : severe pulm HTN / left to right shunt/ PFO/ASD   	  VQ:   < from: NM Pulmonary Ventilation/Perfusion Scan (19 @ 17:47) >  FINDINGS: There is heterogeneous radiotracer distribution in the lungs on   both the ventilation and the perfusion images. There are no segmental   perfusion defects identified.      IMPRESSION:  Very low probability of pulmonary embolus.                                    13.1   7.2   )-----------( 230      ( 2019 06:18 )             39.8     -    142  |  103  |  12.0  ----------------------------<  95  3.5   |  27.0  |  0.60    Ca    8.8      2019 06:18  Phos  4.1     06-20  Mg     1.6     06-20    TPro  5.8<L>  /  Alb  3.3  /  TBili  1.4  /  DBili  0.4<H>  /  AST  27  /  ALT  20  /  AlkPhos  72  06-19    proBNP:   Lipid Profile:   HgA1c: Hemoglobin A1C, Whole Blood: 5.4 % (-20 @ 06:18)      ASSESSMENT:  78m [place] [facility] [place] for witnessed mechanical fall CT [place] done [place] [facility] [place] right SAH,/ MRI with 4mm acute cerebella infarct  HX: HTN/HLD/ PAF on AC / PE/pulm HTN / tricuspid valve disease/Alcohol abuse/sepsis/hemachromatosis  SP RHC : severe pulm htn and / L-R shunting with PFO   Hemodynamically stable without complaints  Neuro f/u noted recs noted for AC     Plan:  continue current meds and management   continue riociguat 0.5 milliGRAM(s) Oral three times a day for P-HTN  start Coumadin / goal INR 2.0-3.0  PT  PFO closure as out pt  abd CT and carotids pending / VQ showed low prob for PE  D/w Dr. Hearn and Dr. James SUBJECTIVE:  Cardiology NP F/U note:  SP: RHC which revealed: severe pulm HTN / left to right shunt/ PFO/ASD   denies complaints of chest pain/sob/dizziness/palps overnight   aurelia diet/ OOB       	  MEDICATIONS  (STANDING):  acetaminophen   Tablet .. 650 milliGRAM(s) Oral every 6 hours  aspirin  chewable 81 milliGRAM(s) Oral daily  atorvastatin 40 milliGRAM(s) Oral at bedtime  chlorhexidine 2% Cloths 1 Application(s) Topical <User Schedule>  citalopram 20 milliGRAM(s) Oral daily  docusate sodium 100 milliGRAM(s) Oral every 8 hours  enoxaparin Injectable 70 milliGRAM(s) SubCutaneous every 12 hours  folic acid 1 milliGRAM(s) Oral daily  melatonin 3 milliGRAM(s) Oral at bedtime  metoprolol succinate ER 25 milliGRAM(s) Oral daily  pantoprazole  Injectable 40 milliGRAM(s) IV Push daily  riociguat 0.5 milliGRAM(s) Oral three times a day  senna 2 Tablet(s) Oral at bedtime  thiamine 100 milliGRAM(s) Oral daily    MEDICATIONS  (PRN):  LORazepam   Injectable 2 milliGRAM(s) IV Push every 2 hours PRN CIWA-Ar score increase by 2 points and a total score of 7 or less  ondansetron Injectable 4 milliGRAM(s) IV Push every 6 hours PRN Nausea      PHYSICAL EXAM:    T(C): 36.6 (19 @ 05:58), Max: 36.9 (19 @ 15:56)  HR: 70 (19 @ 05:58) (64 - 74)  BP: 96/60 (19 @ 05:58) (96/60 - 128/69)  RR: 18 (19 @ 05:58) (18 - 20)  SpO2: 90% (19 @ 05:58) (90% - 97%)  Wt(kg): --    I&O's Summary    2019 07:01  -  2019 07:00  --------------------------------------------------------  IN: 480 mL / OUT: 700 mL / NET: -220 mL        Daily     Daily Weight in k.5 (2019 04:28)    Appearance: NAD	  Cardiovascular: Normal S1 S2, RRR 70's  No JVD, No murmurs, No edema  Respiratory: Lungs clear to auscultation	  Psychiatry: A & O x 3, Mood & affect appropriate  Gastrointestinal:  Soft, Non-tender, + BS	  Skin: warm and dry  Neurologic: Non-focal/ no deficits.   Extremities: Normal range of motion,:  Right Groin soft /no hematoma  dressing removed  Vascular: Peripheral pulses palpable 2+ bilaterally    TELEMETRY: RSR 70's / PVCs no events 	  	  RADIOLOGY:   DIAGNOSTIC TESTING:  [X ] Echocardiogram:  < from: TTE Echo w/Cont Complete (19 @ 10:28) >  Left ventricular ejection fraction, by visual estimation, is 40 to  45%.   2. Mildly decreased global left ventricular systolic function.   3. Spectral Doppler shows impaired relaxation pattern of left   ventricular myocardial filling (Grade I diastolic dysfunction).   4. Severely enlarged right ventricle.   5. Severely reduced RV systolic function.   6. There is no evidence of pericardial effusion.   7. Mild mitral annular calcification.   8. Thickening of the anterior and posterior mitral valve leaflets.   9. Moderate-severe tricuspid regurgitation.  10. Moderate aortic regurgitation.  11. Moderate to severe pulmonic valve regurgitation.  12. Estimated pulmonary artery systolic pressure is 73.7 mmHg assuming a   right atrial pressure of 15 mmHg, which is consistent with severe   pulmonary hypertension.  13. Mildly dilated pulmonary artery.  14. Endocardial visualization was enhanced with intravenous echo contrast.      [ ]  Catheterization: official pending : severe pulm HTN / left to right shunt/ PFO/ASD   	  VQ:   < from: NM Pulmonary Ventilation/Perfusion Scan (19 @ 17:47) >  FINDINGS: There is heterogeneous radiotracer distribution in the lungs on   both the ventilation and the perfusion images. There are no segmental   perfusion defects identified.      IMPRESSION:  Very low probability of pulmonary embolus.                                    13.1   7.2   )-----------( 230      ( 2019 06:18 )             39.8     -    142  |  103  |  12.0  ----------------------------<  95  3.5   |  27.0  |  0.60    Ca    8.8      2019 06:18  Phos  4.1     06-20  Mg     1.6     06-20    TPro  5.8<L>  /  Alb  3.3  /  TBili  1.4  /  DBili  0.4<H>  /  AST  27  /  ALT  20  /  AlkPhos  72  06-19    proBNP:   Lipid Profile:   HgA1c: Hemoglobin A1C, Whole Blood: 5.4 % (-20 @ 06:18)      ASSESSMENT:  78m transferred from [facility] [place], admitted for witnessed mechanical fall CT head done in [facility] [place] revealed right SAH,/ MRI with 4mm acute cerebella infarct  HX: HTN/HLD/ PAF on AC / PE/pulm HTN / tricuspid valve disease/Alcohol abuse/sepsis/hemachromatosis  SP RHC : severe pulm htn and / L-R shunting with PFO   Hemodynamically stable without complaints  Neuro f/u noted recs noted for AC     Plan:  continue current meds and management   continue riociguat 0.5 milliGRAM(s) Oral three times a day for P-HTN  start Coumadin / goal INR 2.0-3.0  PT  PFO closure as out pt  abd CT and carotids pending / VQ showed low prob for PE  D/w Dr. Hearn and Dr. James  D/c tele

## 2019-06-20 NOTE — OCCUPATIONAL THERAPY INITIAL EVALUATION ADULT - PERTINENT HX OF CURRENT PROBLEM, REHAB EVAL
[place] [facility] [place] revealed subarachnoid hemorrhage. 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

## 2019-06-20 NOTE — PROGRESS NOTE ADULT - SUBJECTIVE AND OBJECTIVE BOX
Subjective:  Patient c/o shortness of breath with exertion, such as ambulating to the restroom, alleviated with rest, no aggravating factors. However, denies active chest pain, palpitations, nausea, vomiting, dizziness, headache.     PAST MEDICAL & SURGICAL HISTORY:  ·	ETOH abuse  ·	Nonrheumatic aortic valve insufficiency  ·	Pneumonia  ·	Sepsis  ·	Hemochromatosis  ·	Pulmonary hypertension  ·	Chronic atrial fibrillation  ·	Non-rheumatic tricuspid valve insufficiency  ·	Hyperlipidemia, unspecified hyperlipidemia type  ·	Essential hypertension  ·	Family history of punctured lung  ·	History of hammer toe correction  ·	History of bunionectomy  ·	Laceration of neck, complicated  ·	History of shoulder surgery  ·	History of total right hip replacement    Medications:  ·	acetaminophen   Tablet .. 650 milliGRAM(s) Oral every 6 hours  ·	aspirin  chewable 81 milliGRAM(s) Oral daily  ·	atorvastatin 40 milliGRAM(s) Oral at bedtime  ·	chlorhexidine 2% Cloths 1 Application(s) Topical <User Schedule>  ·	citalopram 20 milliGRAM(s) Oral daily  ·	docusate sodium 100 milliGRAM(s) Oral every 8 hours  ·	enoxaparin Injectable 70 milliGRAM(s) SubCutaneous every 12 hours  ·	folic acid 1 milliGRAM(s) Oral daily  ·	LORazepam   Injectable 2 milliGRAM(s) IV Push every 2 hours PRN  ·	melatonin 3 milliGRAM(s) Oral at bedtime  ·	metoprolol succinate ER 25 milliGRAM(s) Oral daily  ·	ondansetron Injectable 4 milliGRAM(s) IV Push every 6 hours PRN  ·	pantoprazole  Injectable 40 milliGRAM(s) IV Push daily  ·	riociguat 0.5 milliGRAM(s) Oral three times a day  ·	senna 2 Tablet(s) Oral at bedtime  ·	thiamine 100 milliGRAM(s) Oral daily  ·	warfarin 5 milliGRAM(s) Oral once    MEDICATIONS  (PRN):  ·	LORazepam   Injectable 2 milliGRAM(s) IV Push every 2 hours PRN CIWA-Ar score increase by 2 points and a total score of 7 or less  ·	ondansetron Injectable 4 milliGRAM(s) IV Push every 6 hours PRN Nausea      Daily Weight in k.5 (2019 04:28)                        13.1   7.2   )-----------( 230      ( 2019 06:18 )             39.8   06-20    142  |  103  |  12.0  ----------------------------<  95  3.5   |  27.0  |  0.60    Ca    8.8      2019 06:18  Phos  4.1       Mg     1.6         TPro  5.8<L>  /  Alb  3.3  /  TBili  1.4  /  DBili  0.4<H>  /  AST  27  /  ALT  20  /  AlkPhos  72      Objective:  T(C): 36.6 (19 @ 05:58), Max: 36.9 (19 @ 15:56)  HR: 70 (19 @ 05:58) (70 - 74)  BP: 96/60 (19 @ 05:58) (96/60 - 119/63)  RR: 22 (19 @ 09:27) (18 - 22)  SpO2: 84% (19 @ 09:27) (84% - 97%)  Wt(kg): --CAPILLARY BLOOD GLUCOSE    I&O's Summary    2019 07:01  -  2019 07:00  --------------------------------------------------------  IN: 480 mL / OUT: 700 mL / NET: -220 mL    Physical Exam  Neuro: A+O x 3, non-focal, speech clear and intact  Pulm: CTA, equal bilaterally, on supplemental O2  CV: RRR, no murmurs, +S1S2  Abd: soft, NT, ND, +BS  Ext: +DP Pulses b/l, +PT pulses, no edema

## 2019-06-20 NOTE — PHYSICAL THERAPY INITIAL EVALUATION ADULT - PLANNED THERAPY INTERVENTIONS, PT EVAL
stairs negotiation training/gait training/transfer training
transfer training/gait training/bed mobility training/stairs negotiation training

## 2019-06-20 NOTE — PHYSICAL THERAPY INITIAL EVALUATION ADULT - IMPAIRED TRANSFERS: SIT/STAND, REHAB EVAL
decreased strength/decreased activity tolerance
impaired coordination/decreased flexibility/decreased activity tolerance/impaired balance/decreased strength

## 2019-06-20 NOTE — PROGRESS NOTE ADULT - ASSESSMENT
77 y/o M with a PMHx of pulmonary hypertension, severe TR (on STANLEY 06/14/19), large PFO, Afib on coumadin, Pulmonary embolism (CTA 05/30/19), PNA in 2008 complicated by septic multisystem organ failure requiring HD, resolved, Hemochromatosis, HTN, HLD, and who was transferred from [facility] [place] after fall with questionable syncope with right SAH. Patient states that he was drinking beers and a double screwdriver yesterday, and thinks he slipped and fell on the floor. Unclear if patient had a syncopal event or not, patient doesn't think he passed out, but can't be completely sure. Patient states that he has been experiencing dyspnea on exertion for a long time, being worked up by Pulm, CT surgery, and Cardiology. Patient  is currently resting comfortably, denies SOB at rest. Patient hasn't followed up with his Cardiologist since his recent STANLEY. Patient denies any fevers, chills, CP, LE swelling, headache, dizziness, abdominal pain, N/V/D, vision changes, unilateral weakness.       >Right cerebellar CVA-  Initial CT head showed Small SAH vs calcification  MRI brain: 4 mm acute infarct right cerebellum.  Q4 neuro checks  Neurosx following  Neurology called  Add ASA 81, increase statin dose, Lipid panel, A1c  Echo as below  Carotid dupplex pending  DVT ppx: Lovenox & coumadin     Severe Pulmonary HTN and RV failure likely from chronic PE  - PAP 55/20 and PCWP 8 mmHG on cardiac cath 10/2018  - STANLEY on 06/14/19 showed EF 50-55%, severe pulmonary HTN, severely reduced RV fxn, severe TR, and + PFO  - Repeat CTA chest -ve for PE  V/Q scan done to r/o PE in distal pulm vasculature, was -ve for PE  - RHC in October of last year.  - RHC: Severe pulmonary hypertension- 80 systolic. Wedge normal. L to R shunt with PFO/ASD.   - c/w adempas 0.5mg TID as per cardio        2. NSVT  - Secondary to severe right heart failure   - c/w Toprol XL 25mg qday   - Continue telemetry monitoring  - Keep K>4, Mg>2  Cardio appreciated  maintain tele    3. Pulmonary Embolism  - RUL and RLL segmental non-obstructing PE noted on CTA 05/30/19  - CTA chest-ve for PE  V/Q scan done to r/o PE in distal pulm vasculature, was -ve for PE    4. Afib  - Currently in SB   - Toprol XL 25 daily  -Lovenox bridge to coumadin  Daily INR    5. Hemochromatosis  - Heme consult: no phlebotomy needed at this time. will resume phlebotomies as outpatient      Severe TR-  -was being worked up as an outpatient for sx   TTE: EF 40 to 45%.Grade I diastolic dysfunction. Severely enlarged right ventricle. Severely reduced RV systolic function.Moderate-severe tricuspid regurgitation. Moderate aortic regurgitation. Moderate to severe pulmonic valve regurgitation.   severe pulmonary hypertension.  -would await MRI and clearance from neuro prior to any surgical interventions   CTS following  No evidence of cirrhosis on labs  CT A/P done to r/o cirrhosis, negative for cirrhosis  Hep panel -ve      ETOH abuse-  US with hepatomegaly & mild nodularity likely form hemochromatosis, no lab evidence of cirrhosis  hep panel  CIWA protocol  Thiamine folate  SW consult      DVT: Lovenox/ coumadin 77 y/o M with a PMHx of pulmonary hypertension, severe TR (on STANLEY 06/14/19), large PFO, Afib on coumadin, Pulmonary embolism (CTA 05/30/19), PNA in 2008 complicated by septic multisystem organ failure requiring HD, resolved, Hemochromatosis, HTN, HLD, and who was transferred from [facility] [place] after [place] with questionable syncope with right SAH. Patient states that he was drinking beers and a double screwdriver yesterday, and thinks he slipped and fell on the floor. Unclear if patient had a syncopal event or not, patient doesn't think he passed out, but can't be completely sure. Patient states that he has been experiencing dyspnea on exertion for a long time, being worked up by Pulm, CT surgery, and Cardiology. Patient  is currently resting comfortably, denies SOB at rest. Patient hasn't followed up with his Cardiologist since his recent STANLEY. Patient denies any fevers, chills, CP, LE swelling, headache, dizziness, abdominal pain, N/V/D, vision changes, unilateral weakness.       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  Q4 neuro checks  Neurosx following  Neurology called  Add ASA 81, increase statin dose, Lipid panel, A1c  Echo as below  Carotid dupplex pending  DVT ppx: Lovenox & coumadin  PFO closure as outpatient     Severe Pulmonary HTN and RV failure likely from chronic PE  - PAP 55/20 and PCWP 8 mmHG on cardiac cath 10/2018  - STANLEY on 06/14/19 showed EF 50-55%, severe pulmonary HTN, severely reduced RV fxn, severe TR, and + PFO  - Repeat CTA chest -ve for PE  V/Q scan done to r/o PE in distal pulm vasculature, was -ve for PE  - RHC in October of last year.  - RHC: Severe pulmonary hypertension- 80 systolic. Wedge normal. L to R shunt with PFO/ASD.   - c/w adempas 0.5mg TID as per cardio        2. NSVT  - Secondary to severe right heart failure   - c/w Toprol XL 25mg qday   - Continue telemetry monitoring  - Keep K>4, Mg>2  Cardio appreciated  maintain tele    3. Pulmonary Embolism  - RUL and RLL segmental non-obstructing PE noted on CTA 05/30/19  - CTA chest-ve for PE  V/Q scan done to r/o PE in distal pulm vasculature, was -ve for PE    4. Afib  - Currently in SB   - Toprol XL 25 daily  -Lovenox bridge to coumadin  Daily INR    5. Hemochromatosis  - Heme consult: no phlebotomy needed at this time. will resume phlebotomies as outpatient      Severe TR-  -was being worked up as an outpatient for sx   TTE: EF 40 to 45%.Grade I diastolic dysfunction. Severely enlarged right ventricle. Severely reduced RV systolic function.Moderate-severe tricuspid regurgitation. Moderate aortic regurgitation. Moderate to severe pulmonic valve regurgitation.   severe pulmonary hypertension.  -would await MRI and clearance from neuro prior to any surgical interventions   CTS following  No evidence of cirrhosis on labs  CT A/P done to r/o cirrhosis, negative for cirrhosis  Hep panel -ve      ETOH abuse-  US with hepatomegaly & mild nodularity likely form hemochromatosis, no lab evidence of cirrhosis  hep panel  CIWA protocol  Thiamine folate  SW consult      DVT: Lovenox/ coumadin 77 y/o M with a PMHx of pulmonary hypertension, severe TR (on STANLEY 06/14/19), large PFO, Afib on coumadin, Pulmonary embolism (CTA 05/30/19), PNA in 2008 complicated by septic multisystem organ failure requiring HD, resolved, Hemochromatosis, HTN, HLD, and who was transferred from [facility] [place] after fall with questionable syncope with right SAH. Patient states that he was drinking beers and a double screwdriver yesterday, and thinks he slipped and fell on the floor. Unclear if patient had a syncopal event or not, patient doesn't think he passed out, but can't be completely sure. Patient states that he has been experiencing dyspnea on exertion for a long time, being worked up by Pulm, CT surgery, and Cardiology. Patient  is currently resting comfortably, denies SOB at rest. Patient hasn't followed up with his Cardiologist since his recent STANLEY. Patient denies any fevers, chills, CP, LE swelling, headache, dizziness, abdominal pain, N/V/D, vision changes, unilateral weakness.       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  Q4 neuro checks  Neurosx following  Neurology called  Add ASA 81, increase statin dose, Lipid panel, A1c  Echo as below  Carotid dupplex pending  DVT ppx: Lovenox & coumadin  PFO closure as outpatient     Severe Pulmonary HTN and RV failure likely from chronic PE  - PAP 55/20 and PCWP 8 mmHG on cardiac cath 10/2018  - STANLEY on 06/14/19 showed EF 50-55%, severe pulmonary HTN, severely reduced RV fxn, severe TR, and + PFO  - Repeat CTA chest -ve for PE  V/Q scan done to r/o PE in distal pulm vasculature, was -ve for PE  - RHC in October of last year.  - RHC: Severe pulmonary hypertension- 80 systolic. Wedge normal. L to R shunt with PFO/ASD.   - c/w adempas 0.5mg TID as per cardio  Pt saturating 84% at rest on RA & 88% on 4L NC, home O2 being set up        2. NSVT  - Secondary to severe right heart failure   - c/w Toprol XL 25mg qday   - Continue telemetry monitoring  - Keep K>4, Mg>2  Cardio appreciated  maintain tele    3. Pulmonary Embolism  - RUL and RLL segmental non-obstructing PE noted on CTA 05/30/19  - CTA chest-ve for PE  V/Q scan done to r/o PE in distal pulm vasculature, was -ve for PE    4. Afib  - Currently in SB   - Toprol XL 25 daily  -Lovenox bridge to coumadin  Daily INR    5. Hemochromatosis  - Heme consult: no phlebotomy needed at this time. will resume phlebotomies as outpatient      Severe TR-  -was being worked up as an outpatient for sx   TTE: EF 40 to 45%.Grade I diastolic dysfunction. Severely enlarged right ventricle. Severely reduced RV systolic function.Moderate-severe tricuspid regurgitation. Moderate aortic regurgitation. Moderate to severe pulmonic valve regurgitation.   severe pulmonary hypertension.  -would await MRI and clearance from neuro prior to any surgical interventions   CTS following  No evidence of cirrhosis on labs  CT A/P done to r/o cirrhosis, negative for cirrhosis  Hep panel -ve      ETOH abuse-  US with hepatomegaly & mild nodularity likely form hemochromatosis, no lab evidence of cirrhosis  hep panel  CIWA protocol  Thiamine folate  SW consult      DVT: Lovenox/ coumadin

## 2019-06-20 NOTE — OCCUPATIONAL THERAPY INITIAL EVALUATION ADULT - ADDITIONAL COMMENTS
Pt lives in house with 4 EDMAR and no steps inside; bedroom and bathroom are on main level. Bathroom has bathtub with curtains and shower stall with doors. Pt does not own any DME. Pt is left handed. Pt drives. Pt's wife is able and available to assist upon discharge; wife does not drive. Pt and wife have 5 children between the two of them and pt reports all are supportive.

## 2019-06-20 NOTE — PROGRESS NOTE ADULT - ASSESSMENT
78y Male PMH afib on Coumadin, severe tricuspid regurgitation, large PFO, PE 5/30/19, PNA in 2008 complicated by sepsis and multisystem organ failure requiring HD- resolved, hemochromatosis, HTN, HLD, tx from [facility] s/p questionable syncope and fall found with possible R SAH on CT head. Repeat CT head on arrival read as likely calcification.  - GCS 15, no focal neurologic deficit    PLAN:  - Continue Q4 neuro checks  - OK for full dose anticoagulation given history of A.Fib and PE as MRI does not reveal evidence of hemorrhage and small cerebellar infarct not at high risk for hemorrhagic transformation  - Neurology following. Consider closing PFO given history of PE and possible embolic CVA.   - Cardiology following  - Hematology following  - Mobilization as tolerated  - DVT prophylaxis: b/l venodynes, on full dose SQL  - Supportive care/further medical management per primary team  - Discussed with Dr. Cummings 78y Male PMH afib on Coumadin, severe tricuspid regurgitation, large PFO, PE 5/30/19, PNA in 2008 complicated by sepsis and multisystem organ failure requiring HD- resolved, hemochromatosis, HTN, HLD, tx from [facility] s/p questionable syncope and fall found with possible R SAH on CT head. Repeat CT head on arrival read as likely calcification.  - GCS 15, no focal neurologic deficit    PLAN:  - Continue Q4 neuro checks  - OK for full dose anticoagulation given history of A.Fib and PE as MRI does not reveal evidence of hemorrhage and small cerebellar infarct not at high risk for hemorrhagic transformation  - Neurology following. Consider closing PFO given history of PE and possible embolic CVA.   - Cardiology following  - Hematology following  - Mobilization as tolerated  - DVT prophylaxis: b/l venodynes, on full dose SQL  - Supportive care/further medical management per primary team  - Reconsult neurosurgery as needed   - Discussed with Dr. Cummings

## 2019-06-20 NOTE — PROGRESS NOTE ADULT - ATTENDING COMMENTS
pt seen and examined.   Plan of care d/w np.   pt with PHTN, prior DVT/PEs  pt on AC. warfarin started.  Pt with PF/ASD and stroke  On Adempas for pulmonary htn.  plan for dc tomorrow  We will contact  to obtain supply of adempas

## 2019-06-20 NOTE — PROGRESS NOTE ADULT - PROBLEM SELECTOR PROBLEM 2
Cerebrovascular accident (CVA) due to embolism of cerebellar artery, unspecified blood vessel laterality

## 2019-06-20 NOTE — PROGRESS NOTE ADULT - ASSESSMENT
77 y/o M with a PMHx of pulmonary hypertension, severe TR (on STANLEY 06/14/19) being worked up as an outpatient by Dr. Lofton, large PFO, Afib on coumadin, Pulmonary embolism (CTA 05/30/19), PNA in 2008 complicated by septic multisystem organ failure requiring HD since resolved, Hemochromatosis, HTN, HLD, and who was transferred via air [place] [facility] [place] after fall with questionable right SAH. On this admission, patient is being followed by Heme/Onc for hemachromatosis, Cardiology for PE (VQ scan negative, no PE on current imaging) and Pulmonary HTN, and neurology/neurosurgery for right cerebellar CVA suspected that and alcohol overuse    Severe TR   -was being worked up as an outpatient for sx   -tte to eval severity   -would await MRI and clearance from neuro prior to any surgical interventions     ETOH abuse  -r/o cirrhosis   -recc abdominal ultrasound   -low threshold for hep panel thereafter     Severe Pulmonary HTN and RV failure  - WHO class 4 as per cardiology   - PAP 55/20 and PCWP 8 mmHG on cardiac cath 10/2018  - STANLEY on 06/14/19 showed EF 50-55%, severe pulmonary HTN, severely reduced RV fxn, severe TR, and + PFO  - Recommend Pulm evaluation  - TTE repeat     Pulmonary Embolism  - anticoagulation as per cardiology when cleared by neuro   - PE with non surgical management at this time     Afib  - Currently in SB   - Toprol XL 25 daily  - Restart coumadin as above    E/M TIME SPENT:   30 minutes of noncontinuous time spent   Evaluating/treating patient, reviewing data/labs/imaging, discussing case with multidisciplinary team, discussing plan/goals of care with patient/family about patient's condition . Non-inclusive of procedure time.   greater than 50% of time spent educating patient about condition, medication and prognosis. 79 y/o M with a PMHx of pulmonary hypertension, severe TR (on STANLEY 06/14/19) being worked up as an outpatient by Dr. Lofton, large PFO, Afib on coumadin, Pulmonary embolism (CTA 05/30/19), PNA in 2008 complicated by septic multisystem organ failure requiring HD since resolved, Hemochromatosis, HTN, HLD, and who [place] [facility] [place] with questionable right SAH. On this admission, patient is being followed by Heme/Onc for hemachromatosis, Cardiology for PE (VQ scan negative, no PE on current imaging) and Pulmonary HTN, and neurology/neurosurgery for right cerebellar CVA suspected 2/2 emboli from PFO and alcohol overuse. CT surgery was consulted for further surgical work up regarding severe TR.     Severe TR   -was being worked up as an outpatient for sx   -TTE completed, moderate to severe TR, moderate AI, severely enlarged RV, EF 40-45%,   -MRI pending for clearance from neuro prior to any surgical interventions. however was cleared for A/C for Afib   -U/S Abdomen showed questionable mild nodularity of the liver, scheduled for f/u CT abdomen/pelvis with IVC for further evaluation to r/o cirrhosis, hepatitis panel also ordered  -Patient will then continue work up as an outpatient.  -On discharge, please include Dr. Lofton's information for follow up appointment as follows:  ·	Please follow up with Dr. Lofton by calling [phone].  ·	The cardiac surgery office is on the second floor of [facility].   ·	Take the [place] ("G") elevators to 2 and make a left.   ·	Walk down to the second hallway on your left (before the double doors).   ·	Sign says Cardiovascular and Thoracic Surgery.  ·	The waiting room is on your left.     -Per Dr. Hodges at the bedside, patient will be trialed on pulmonary HTN meds prior to OR

## 2019-06-20 NOTE — OCCUPATIONAL THERAPY INITIAL EVALUATION ADULT - GENERAL OBSERVATIONS, REHAB EVAL
Received pt in semi-holilngsworth position in bed, +IV lock, +telemetry, +6LNCO2, +VCBs; pt agrees to OT.

## 2019-06-20 NOTE — OCCUPATIONAL THERAPY INITIAL EVALUATION ADULT - MANUAL MUSCLE TESTING RESULTS, REHAB EVAL
bilateral shoulders grossly assessed with AROM against gravity 3/5, bilateral elbows grossly assessed with AROM against gravity 3/5, bilateral gross grasp 5/5

## 2019-06-20 NOTE — OCCUPATIONAL THERAPY INITIAL EVALUATION ADULT - PLANNED THERAPY INTERVENTIONS, OT EVAL
neuromuscular re-education/ADL retraining/transfer training/toilet/balance training/bed mobility training

## 2019-06-20 NOTE — PROGRESS NOTE ADULT - ASSESSMENT
The patient is a 78y Male who is followed by neurology because of CVA    CVA  small right cerebellar CVA  asymptomatic  has large PFO on STANLEY with continuous right to left shunt  would consider closing this PFO as he has possible embolic CVA and recent CTA chest 5/30 showed PE    - per chart cardiology to arrange for closure as an outpatienbt  check fasting lipids    continue ASA, statin  consider anticoagulation- there is no blood on MRI and the cerebellar stroke is tiny and is not at significant risk for hemorrhagic transformation.    Lipids  check fasting lipid panel LDL=49  goal LDL is under 70--> at goal  continue lipitor 40 mg daily      Alcohol overuse  Agree with CIWA for now not appearing to be withdrawing    Neuro stable.  No further inpatient neuro work up to suggest at this time    Thank you for allowing me to participate in the care of your patient    Naman Mock MD, PhD   [id number]

## 2019-06-20 NOTE — PHYSICAL THERAPY INITIAL EVALUATION ADULT - CRITERIA FOR SKILLED THERAPEUTIC INTERVENTIONS
predicted duration of therapy intervention/risk reduction/prevention/impairments found/functional limitations in following categories/anticipated equipment needs at discharge/therapy frequency/anticipated discharge recommendation/rehab potential
impairments found/functional limitations in following categories/therapy frequency/predicted duration of therapy intervention/risk reduction/prevention/rehab potential/anticipated equipment needs at discharge/anticipated discharge recommendation

## 2019-06-20 NOTE — PHYSICAL THERAPY INITIAL EVALUATION ADULT - IMPAIRMENTS FOUND, PT EVAL
gait, locomotion, and balance/muscle strength/aerobic capacity/endurance
gait, locomotion, and balance/muscle strength/aerobic capacity/endurance

## 2019-06-21 ENCOUNTER — TRANSCRIPTION ENCOUNTER (OUTPATIENT)
Age: 79
End: 2019-06-21

## 2019-06-21 VITALS
TEMPERATURE: 98 F | OXYGEN SATURATION: 92 % | HEART RATE: 70 BPM | RESPIRATION RATE: 18 BRPM | DIASTOLIC BLOOD PRESSURE: 70 MMHG | SYSTOLIC BLOOD PRESSURE: 105 MMHG

## 2019-06-21 LAB
ANION GAP SERPL CALC-SCNC: 14 MMOL/L — SIGNIFICANT CHANGE UP (ref 5–17)
APTT BLD: 36.1 SEC — SIGNIFICANT CHANGE UP (ref 27.5–36.3)
BASOPHILS # BLD AUTO: 0 K/UL — SIGNIFICANT CHANGE UP (ref 0–0.2)
BASOPHILS NFR BLD AUTO: 0.2 % — SIGNIFICANT CHANGE UP (ref 0–2)
BUN SERPL-MCNC: 16 MG/DL — SIGNIFICANT CHANGE UP (ref 8–20)
CALCIUM SERPL-MCNC: 9 MG/DL — SIGNIFICANT CHANGE UP (ref 8.6–10.2)
CHLORIDE SERPL-SCNC: 99 MMOL/L — SIGNIFICANT CHANGE UP (ref 98–107)
CO2 SERPL-SCNC: 29 MMOL/L — SIGNIFICANT CHANGE UP (ref 22–29)
CREAT SERPL-MCNC: 0.73 MG/DL — SIGNIFICANT CHANGE UP (ref 0.5–1.3)
EOSINOPHIL # BLD AUTO: 0.1 K/UL — SIGNIFICANT CHANGE UP (ref 0–0.5)
EOSINOPHIL NFR BLD AUTO: 1.8 % — SIGNIFICANT CHANGE UP (ref 0–5)
GLUCOSE SERPL-MCNC: 92 MG/DL — SIGNIFICANT CHANGE UP (ref 70–115)
HCT VFR BLD CALC: 41.2 % — LOW (ref 42–52)
HGB BLD-MCNC: 13.2 G/DL — LOW (ref 14–18)
INR BLD: 1.33 RATIO — HIGH (ref 0.88–1.16)
LYMPHOCYTES # BLD AUTO: 0.9 K/UL — LOW (ref 1–4.8)
LYMPHOCYTES # BLD AUTO: 17.7 % — LOW (ref 20–55)
MAGNESIUM SERPL-MCNC: 1.7 MG/DL — LOW (ref 1.8–2.6)
MCHC RBC-ENTMCNC: 29.3 PG — SIGNIFICANT CHANGE UP (ref 27–31)
MCHC RBC-ENTMCNC: 32 G/DL — SIGNIFICANT CHANGE UP (ref 32–36)
MCV RBC AUTO: 91.4 FL — SIGNIFICANT CHANGE UP (ref 80–94)
MONOCYTES # BLD AUTO: 0.9 K/UL — HIGH (ref 0–0.8)
MONOCYTES NFR BLD AUTO: 16.9 % — HIGH (ref 3–10)
NEUTROPHILS # BLD AUTO: 3.2 K/UL — SIGNIFICANT CHANGE UP (ref 1.8–8)
NEUTROPHILS NFR BLD AUTO: 62.8 % — SIGNIFICANT CHANGE UP (ref 37–73)
PHOSPHATE SERPL-MCNC: 4.1 MG/DL — SIGNIFICANT CHANGE UP (ref 2.4–4.7)
PLATELET # BLD AUTO: 220 K/UL — SIGNIFICANT CHANGE UP (ref 150–400)
POTASSIUM SERPL-MCNC: 4.1 MMOL/L — SIGNIFICANT CHANGE UP (ref 3.5–5.3)
POTASSIUM SERPL-SCNC: 4.1 MMOL/L — SIGNIFICANT CHANGE UP (ref 3.5–5.3)
PROTHROM AB SERPL-ACNC: 15.4 SEC — HIGH (ref 10–12.9)
RBC # BLD: 4.51 M/UL — LOW (ref 4.6–6.2)
RBC # FLD: 16.7 % — HIGH (ref 11–15.6)
SODIUM SERPL-SCNC: 142 MMOL/L — SIGNIFICANT CHANGE UP (ref 135–145)
WBC # BLD: 5.1 K/UL — SIGNIFICANT CHANGE UP (ref 4.8–10.8)
WBC # FLD AUTO: 5.1 K/UL — SIGNIFICANT CHANGE UP (ref 4.8–10.8)

## 2019-06-21 PROCEDURE — 99232 SBSQ HOSP IP/OBS MODERATE 35: CPT

## 2019-06-21 PROCEDURE — 80307 DRUG TEST PRSMV CHEM ANLYZR: CPT

## 2019-06-21 PROCEDURE — 82947 ASSAY GLUCOSE BLOOD QUANT: CPT

## 2019-06-21 PROCEDURE — 82330 ASSAY OF CALCIUM: CPT

## 2019-06-21 PROCEDURE — 70450 CT HEAD/BRAIN W/O DYE: CPT

## 2019-06-21 PROCEDURE — 99285 EMERGENCY DEPT VISIT HI MDM: CPT

## 2019-06-21 PROCEDURE — 83690 ASSAY OF LIPASE: CPT

## 2019-06-21 PROCEDURE — 80053 COMPREHEN METABOLIC PANEL: CPT

## 2019-06-21 PROCEDURE — 86900 BLOOD TYPING SEROLOGIC ABO: CPT

## 2019-06-21 PROCEDURE — 83735 ASSAY OF MAGNESIUM: CPT

## 2019-06-21 PROCEDURE — C8929: CPT

## 2019-06-21 PROCEDURE — 85014 HEMATOCRIT: CPT

## 2019-06-21 PROCEDURE — 83036 HEMOGLOBIN GLYCOSYLATED A1C: CPT

## 2019-06-21 PROCEDURE — 80076 HEPATIC FUNCTION PANEL: CPT

## 2019-06-21 PROCEDURE — 97163 PT EVAL HIGH COMPLEX 45 MIN: CPT

## 2019-06-21 PROCEDURE — 84295 ASSAY OF SERUM SODIUM: CPT

## 2019-06-21 PROCEDURE — 86901 BLOOD TYPING SEROLOGIC RH(D): CPT

## 2019-06-21 PROCEDURE — C1894: CPT

## 2019-06-21 PROCEDURE — A9540: CPT

## 2019-06-21 PROCEDURE — 85610 PROTHROMBIN TIME: CPT

## 2019-06-21 PROCEDURE — A9567: CPT

## 2019-06-21 PROCEDURE — 97167 OT EVAL HIGH COMPLEX 60 MIN: CPT

## 2019-06-21 PROCEDURE — 82803 BLOOD GASES ANY COMBINATION: CPT

## 2019-06-21 PROCEDURE — 93312 ECHO TRANSESOPHAGEAL: CPT

## 2019-06-21 PROCEDURE — 82435 ASSAY OF BLOOD CHLORIDE: CPT

## 2019-06-21 PROCEDURE — 93005 ELECTROCARDIOGRAM TRACING: CPT

## 2019-06-21 PROCEDURE — 70551 MRI BRAIN STEM W/O DYE: CPT

## 2019-06-21 PROCEDURE — 84100 ASSAY OF PHOSPHORUS: CPT

## 2019-06-21 PROCEDURE — 82962 GLUCOSE BLOOD TEST: CPT

## 2019-06-21 PROCEDURE — 74177 CT ABD & PELVIS W/CONTRAST: CPT

## 2019-06-21 PROCEDURE — 93325 DOPPLER ECHO COLOR FLOW MAPG: CPT

## 2019-06-21 PROCEDURE — 99239 HOSP IP/OBS DSCHRG MGMT >30: CPT

## 2019-06-21 PROCEDURE — 85730 THROMBOPLASTIN TIME PARTIAL: CPT

## 2019-06-21 PROCEDURE — 76937 US GUIDE VASCULAR ACCESS: CPT

## 2019-06-21 PROCEDURE — 93320 DOPPLER ECHO COMPLETE: CPT

## 2019-06-21 PROCEDURE — 78582 LUNG VENTILAT&PERFUS IMAGING: CPT

## 2019-06-21 PROCEDURE — 71275 CT ANGIOGRAPHY CHEST: CPT

## 2019-06-21 PROCEDURE — 93880 EXTRACRANIAL BILAT STUDY: CPT

## 2019-06-21 PROCEDURE — C1769: CPT

## 2019-06-21 PROCEDURE — 36600 WITHDRAWAL OF ARTERIAL BLOOD: CPT

## 2019-06-21 PROCEDURE — 80061 LIPID PANEL: CPT

## 2019-06-21 PROCEDURE — 84132 ASSAY OF SERUM POTASSIUM: CPT

## 2019-06-21 PROCEDURE — 81001 URINALYSIS AUTO W/SCOPE: CPT

## 2019-06-21 PROCEDURE — C1889: CPT

## 2019-06-21 PROCEDURE — 86850 RBC ANTIBODY SCREEN: CPT

## 2019-06-21 PROCEDURE — 80048 BASIC METABOLIC PNL TOTAL CA: CPT

## 2019-06-21 PROCEDURE — 93451 RIGHT HEART CATH: CPT

## 2019-06-21 PROCEDURE — 71045 X-RAY EXAM CHEST 1 VIEW: CPT

## 2019-06-21 PROCEDURE — 83605 ASSAY OF LACTIC ACID: CPT

## 2019-06-21 PROCEDURE — 85027 COMPLETE CBC AUTOMATED: CPT

## 2019-06-21 PROCEDURE — 76705 ECHO EXAM OF ABDOMEN: CPT

## 2019-06-21 PROCEDURE — 80074 ACUTE HEPATITIS PANEL: CPT

## 2019-06-21 PROCEDURE — 36415 COLL VENOUS BLD VENIPUNCTURE: CPT

## 2019-06-21 PROCEDURE — 93970 EXTREMITY STUDY: CPT

## 2019-06-21 RX ORDER — ENOXAPARIN SODIUM 100 MG/ML
70 INJECTION SUBCUTANEOUS
Qty: 1000 | Refills: 0
Start: 2019-06-21 | End: 2019-06-27

## 2019-06-21 RX ORDER — METOPROLOL TARTRATE 50 MG
1 TABLET ORAL
Qty: 30 | Refills: 0
Start: 2019-06-21 | End: 2019-07-20

## 2019-06-21 RX ORDER — WARFARIN SODIUM 2.5 MG/1
1 TABLET ORAL
Qty: 0 | Refills: 0 | DISCHARGE

## 2019-06-21 RX ORDER — SENNA PLUS 8.6 MG/1
2 TABLET ORAL
Qty: 0 | Refills: 0 | DISCHARGE
Start: 2019-06-21

## 2019-06-21 RX ORDER — ACETAMINOPHEN 500 MG
2 TABLET ORAL
Qty: 0 | Refills: 0 | DISCHARGE
Start: 2019-06-21

## 2019-06-21 RX ORDER — LOVASTATIN 20 MG
1 TABLET ORAL
Qty: 0 | Refills: 0 | DISCHARGE

## 2019-06-21 RX ORDER — ASPIRIN/CALCIUM CARB/MAGNESIUM 324 MG
1 TABLET ORAL
Qty: 30 | Refills: 0
Start: 2019-06-21 | End: 2019-07-20

## 2019-06-21 RX ORDER — METOPROLOL TARTRATE 50 MG
12.5 TABLET ORAL
Qty: 0 | Refills: 0 | DISCHARGE

## 2019-06-21 RX ORDER — MAGNESIUM SULFATE 500 MG/ML
2 VIAL (ML) INJECTION ONCE
Refills: 0 | Status: COMPLETED | OUTPATIENT
Start: 2019-06-21 | End: 2019-06-21

## 2019-06-21 RX ORDER — FOLIC ACID 0.8 MG
1 TABLET ORAL
Qty: 0 | Refills: 0 | DISCHARGE
Start: 2019-06-21

## 2019-06-21 RX ORDER — WARFARIN SODIUM 2.5 MG/1
5 TABLET ORAL ONCE
Refills: 0 | Status: DISCONTINUED | OUTPATIENT
Start: 2019-06-21 | End: 2019-06-21

## 2019-06-21 RX ORDER — ATORVASTATIN CALCIUM 80 MG/1
1 TABLET, FILM COATED ORAL
Qty: 30 | Refills: 0
Start: 2019-06-21 | End: 2019-07-20

## 2019-06-21 RX ORDER — DOCUSATE SODIUM 100 MG
1 CAPSULE ORAL
Qty: 0 | Refills: 0 | DISCHARGE
Start: 2019-06-21

## 2019-06-21 RX ORDER — RIOCIGUAT 1.5 MG/1
1 TABLET, FILM COATED ORAL
Qty: 0 | Refills: 0 | DISCHARGE
Start: 2019-06-21

## 2019-06-21 RX ORDER — THIAMINE MONONITRATE (VIT B1) 100 MG
1 TABLET ORAL
Qty: 0 | Refills: 0 | DISCHARGE
Start: 2019-06-21

## 2019-06-21 RX ORDER — WARFARIN SODIUM 2.5 MG/1
1 TABLET ORAL
Qty: 30 | Refills: 0
Start: 2019-06-21 | End: 2019-07-20

## 2019-06-21 RX ADMIN — CITALOPRAM 20 MILLIGRAM(S): 10 TABLET, FILM COATED ORAL at 11:17

## 2019-06-21 RX ADMIN — Medication 650 MILLIGRAM(S): at 05:50

## 2019-06-21 RX ADMIN — Medication 650 MILLIGRAM(S): at 12:00

## 2019-06-21 RX ADMIN — PANTOPRAZOLE SODIUM 40 MILLIGRAM(S): 20 TABLET, DELAYED RELEASE ORAL at 11:17

## 2019-06-21 RX ADMIN — Medication 650 MILLIGRAM(S): at 11:16

## 2019-06-21 RX ADMIN — Medication 50 GRAM(S): at 11:17

## 2019-06-21 RX ADMIN — ENOXAPARIN SODIUM 65 MILLIGRAM(S): 100 INJECTION SUBCUTANEOUS at 09:01

## 2019-06-21 RX ADMIN — Medication 81 MILLIGRAM(S): at 11:17

## 2019-06-21 RX ADMIN — Medication 650 MILLIGRAM(S): at 17:36

## 2019-06-21 RX ADMIN — ENOXAPARIN SODIUM 65 MILLIGRAM(S): 100 INJECTION SUBCUTANEOUS at 17:36

## 2019-06-21 RX ADMIN — Medication 1 MILLIGRAM(S): at 11:17

## 2019-06-21 RX ADMIN — RIOCIGUAT 0.5 MILLIGRAM(S): 1.5 TABLET, FILM COATED ORAL at 07:47

## 2019-06-21 RX ADMIN — Medication 100 MILLIGRAM(S): at 14:30

## 2019-06-21 RX ADMIN — Medication 25 MILLIGRAM(S): at 05:50

## 2019-06-21 RX ADMIN — RIOCIGUAT 0.5 MILLIGRAM(S): 1.5 TABLET, FILM COATED ORAL at 14:30

## 2019-06-21 RX ADMIN — Medication 650 MILLIGRAM(S): at 06:20

## 2019-06-21 RX ADMIN — Medication 100 MILLIGRAM(S): at 11:17

## 2019-06-21 NOTE — CHART NOTE - NSCHARTNOTEFT_GEN_A_CORE
CTS Note       77 y/o M with a PMHx of pulmonary hypertension, severe TR (on STANLEY 06/14/19) being worked up as an outpatient by Dr. Lofton, large PFO, Afib on coumadin, Pulmonary embolism (CTA 05/30/19), PNA in 2008 complicated by septic multisystem organ failure requiring HD since resolved, Hemochromatosis, HTN, HLD, and who was transferred via air from [facility] [place]17[place] after fall with questionable right SAH. On this admission, patient is being followed by Heme/Onc for hemachromatosis, Cardiology for PE (VQ scan negative, no PE on current imaging) and Pulmonary HTN, and neurology/neurosurgery for right cerebellar CVA suspected 2/2 emboli from PFO and alcohol overuse. CT surgery was consulted for further surgical work up regarding severe TR.     Pt is to continue medical management  Follow up with Dr Lofton, Dr Renae , Dr Gonzalez and [facility] as an outpatient     DW CT Surgery in am rounds

## 2019-06-21 NOTE — PROGRESS NOTE ADULT - SUBJECTIVE AND OBJECTIVE BOX
Patient feels well today.  Happy about being able to go home.   Reports he slept well.  States he has not pain or concerns.      FUNCTIONAL PROGRESS  6/20  Bed Mobility: Rolling/Turning:     · Level of Butts	independent	    Bed Mobility: Scooting/Bridging:     · Level of Butts	independent	    Bed Mobility: Sit to Supine:     · Level of Butts	supervision	  		  · Physical Assist/Nonphysical Assist	verbal cues	  		   		    Bed Mobility: Supine to Sit:     · Level of Butts	supervision	  		  · Physical Assist/Nonphysical Assist	verbal cues	  		   		    Bed Mobility Analysis:     · Bed Mobility Limitations	decreased ability to use arms for pushing/pulling	  · Impairments Contributing to Impaired Bed Mobility	decreased strength; decreased flexibility	    Transfer: Sit to Stand:     · Level of Butts	contact guard	  · Physical Assist/Nonphysical Assist	verbal cues; nonverbal cues (demo/gestures)	  · Weight-Bearing Restrictions	full weight-bearing	  · Assistive Device	hand held assist	    Transfer: Stand to Sit:     · Level of Butts	contact guard	  · Physical Assist/Nonphysical Assist	verbal cues; nonverbal cues (demo/gestures)	  · Weight-Bearing Restrictions	full weight-bearing	  · Assistive Device	hand held assist	    Sit/Stand Transfer Safety Analysis:     · Transfer Safety Concerns Noted	decreased weight-shifting ability; in sagittal plane	  · Impairments Contributing to Impaired Transfers	decreased strength; decreased activity tolerance; impaired coordination; decreased flexibility; impaired balance	    Gait Skills:     · Level of Butts	minimum assist (75% patients effort)	  · Physical Assist/Nonphysical Assist	verbal cues; nonverbal cues (demo/gestures)	  · Weight-Bearing Restrictions	full weight-bearing	  · Assistive Device	hand held assist	  · Gait Distance	5 feet; times 2	    Bathing Training:     · Level of Butts	supervision; sponge	  · Physical Assist/Nonphysical Assist	set-up required; verbal cues	    Upper Body Dressing Training:     · Level of Butts	supervision; gown	  · Physical Assist/Nonphysical Assist	set-up required; verbal cues	    Lower Body Dressing Training:     · Level of Butts	minimum assist (75% patients effort); socks in sitting via LE crossing	  · Physical Assist/Nonphysical Assist	1 person assist; nonverbal cues (demo/gestures); verbal cues	    Toilet Hygiene Training:     · Level of Butts	contact guard; +urination on commode during evaluation; pt performed hygiene in sitting	  · Physical Assist/Nonphysical Assist	set-up required; verbal cues	    Grooming Training:     · Level of Butts	supervision; to wash hands in sitting	  · Physical Assist/Nonphysical Assist	set-up required; verbal cues	    REVIEW OF SYSTEMS  Constitutional - No fever,  No fatigue  HEENT - No vertigo, No neck pain  Neurological - No headaches, No memory loss, No loss of strength, No numbness, No tremors  Skin - No rashes, No lesions   Musculoskeletal - No joint pain, No joint swelling, No muscle pain  Psychiatric - No depression, No anxiety    VITALS  T(C): 36.9 (06-21-19 @ 05:53), Max: 37 (06-20-19 @ 21:25)  HR: 70 (06-21-19 @ 05:53) (64 - 73)  BP: 97/58 (06-21-19 @ 05:53) (90/53 - 108/64)  RR: 18 (06-21-19 @ 05:53) (18 - 22)  SpO2: 92% (06-21-19 @ 05:53) (91% - 92%)  Wt(kg): --    MEDICATIONS   acetaminophen   Tablet .. 650 milliGRAM(s) every 6 hours  aspirin  chewable 81 milliGRAM(s) daily  atorvastatin 40 milliGRAM(s) at bedtime  chlorhexidine 2% Cloths 1 Application(s) <User Schedule>  citalopram 20 milliGRAM(s) daily  docusate sodium 100 milliGRAM(s) every 8 hours  enoxaparin Injectable 65 milliGRAM(s) every 12 hours  folic acid 1 milliGRAM(s) daily  LORazepam   Injectable 2 milliGRAM(s) every 2 hours PRN  melatonin 3 milliGRAM(s) at bedtime  metoprolol succinate ER 25 milliGRAM(s) daily  ondansetron Injectable 4 milliGRAM(s) every 6 hours PRN  pantoprazole  Injectable 40 milliGRAM(s) daily  riociguat 0.5 milliGRAM(s) three times a day  senna 2 Tablet(s) at bedtime  thiamine 100 milliGRAM(s) daily      RECENT LABS - Reviewed                        13.2   5.1   )-----------( 220      ( 21 Jun 2019 06:14 )             41.2     06-21    142  |  99  |  16.0  ----------------------------<  92  4.1   |  29.0  |  0.73    Ca    9.0      21 Jun 2019 06:14  Phos  4.1     06-21  Mg     1.7     06-21      PT/INR - ( 21 Jun 2019 06:14 )   PT: 15.4 sec;   INR: 1.33 ratio         PTT - ( 21 Jun 2019 06:14 )  PTT:36.1 sec          ----------------------------------------------------------------------------------------  PHYSICAL EXAM  Constitutional - NAD, Comfortable  Extremities - No edema  Neurologic Exam -                    Motor - No focal deficits     Sensory - Intact to LT  Psychiatric - Mood stable, Affect WNL  ----------------------------------------------------------------------------------------  ASSESSMENT/PLAN  78yMale with functional deficits after an EtOH related fall sustaining a SAH and acute CVA  Acute CVA - ASA, Lipitor  PFO - Closure as outpatient   AFIB - Coumadin + Lovenox Bridge  EtOH - CIWA, Ativan, Folate, Thiamine  Mood - Celexa 	  Pain - Tylenol  Constipation - Coalce, Senna  DVT PPX - SCDs  Rehab - Continue to recommend DC HOME. Continue bedside therapy as well as OOB throughout the day with mobilization by staff to maintain cardiopulmonary function and prevention of secondary complications related to debility. 	    Will sign off, please reconsult for additional rehab dispo needs if functional status changes.

## 2019-06-21 NOTE — DISCHARGE NOTE PROVIDER - PROVIDER TOKENS
PROVIDER:[TOKEN:[[id number]]],PROVIDER:[TOKEN:[[id number]]],PROVIDER:[TOKEN:[[id number]]]

## 2019-06-21 NOTE — PROVIDER CONTACT NOTE (OTHER) - SITUATION
pt O2 saturation on room air 80%.   pt O2 saturation on 4LO2 94% pt O2 saturation on room air at rest 80%.   pt O2 saturation on 4LO2 at rest 94%

## 2019-06-21 NOTE — DISCHARGE NOTE PROVIDER - NSDCCPCAREPLAN_GEN_ALL_CORE_FT
PRINCIPAL DISCHARGE DIAGNOSIS  Diagnosis: Cerebrovascular accident (CVA)  Assessment and Plan of Treatment: Follow up with your primary doctor within 1 week of discharge & neurology & cardiology      SECONDARY DISCHARGE DIAGNOSES  Diagnosis: Severe tricuspid regurgitation  Assessment and Plan of Treatment: Follow up with Dr. Lofton    Diagnosis: Atrial fibrillation  Assessment and Plan of Treatment: Follow up with your primary doctor within 1 week of discharge & cardioology    Diagnosis: Fall from standing, initial encounter  Assessment and Plan of Treatment: Follow up with your primary doctor within 1 week of discharge. Stop using alcohol PRINCIPAL DISCHARGE DIAGNOSIS  Diagnosis: Cerebrovascular accident (CVA)  Assessment and Plan of Treatment: Follow up with your primary doctor within 1 week of discharge & neurology & cardiology      SECONDARY DISCHARGE DIAGNOSES  Diagnosis: Severe tricuspid regurgitation  Assessment and Plan of Treatment: Follow up with Dr. Lofton    Diagnosis: Atrial fibrillation  Assessment and Plan of Treatment: Follow up with your primary doctor or cardioology in 1 week. INR check on Monday. INR goal 2-3. Stop using lovenox if INR is 2-3 & continue coumadin.    Diagnosis: Fall from standing, initial encounter  Assessment and Plan of Treatment: Follow up with your primary doctor within 1 week of discharge. Stop using alcohol

## 2019-06-21 NOTE — DISCHARGE NOTE PROVIDER - CARE PROVIDERS DIRECT ADDRESSES
[email or web address]

## 2019-06-21 NOTE — PROGRESS NOTE ADULT - REASON FOR ADMISSION
hemochromatosis, PE
Pulmonary HTN
Shortness of breath
pulmonary HTN
C
ICH
Admitted 6/17 questionable syncope and fall found with possible small high medial parietal SAH; transferred to [facility] for further evaluation

## 2019-06-21 NOTE — PROGRESS NOTE ADULT - PROVIDER SPECIALTY LIST ADULT
CT Surgery
Cardiology
Heme/Onc
Heme/Onc
Hospitalist
Intervent Cardiology
Neurology
Neurosurgery
Rehab Medicine
Rehab Medicine
SICU
Cardiology
Heme/Onc

## 2019-06-21 NOTE — DISCHARGE NOTE PROVIDER - CARE PROVIDER_API CALL
Naman Mock; PhD)  Neurology; Vascular Neurology  [address], [address]  [place] [address]  Phone: [phone]  Fax: [phone]  Follow Up Time:     Gabriel Hearn)  Cardiovascular Disease  [address], [address]  [place] [address]  Phone: [phone]  Fax: [phone]  Follow Up Time:     Ulysses Lofton)  Surgery; Thoracic and Cardiac Surgery  [address] [place] [address]  Phone: [phone]  Fax: [phone]  Follow Up Time:

## 2019-06-21 NOTE — DISCHARGE NOTE PROVIDER - HOSPITAL COURSE
77 y/o M with a PMHx of pulmonary hypertension, severe TR (on STANLEY 06/14/19), large PFO, Afib on coumadin, Pulmonary embolism (CTA 05/30/19), PNA in 2008 complicated by septic multisystem organ failure requiring HD, resolved, Hemochromatosis, HTN, HLD, and [place] [facility] [place] questionable syncope with right SAH. Patient states that he was drinking beers and a double screwdriver yesterday, and thinks he slipped and fell on the floor. Unclear if patient had a syncopal event or not, patient doesn't think he passed out, but can't be completely sure. Patient states that he has been experiencing dyspnea on exertion for a long time, being worked up by Pulm, CT surgery, and Cardiology. Patient  is currently resting comfortably, denies SOB at rest. Patient hasn't followed up with his Cardiologist since his recent STANLEY. Patient denies any fevers, chills, CP, LE swelling, headache, dizziness, abdominal pain, N/V/D, vision changes, unilateral weakness.             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    - RHC: Severe pulmonary hypertension- 80 systolic. Wedge normal. L to R shunt with PFO/ASD.     - c/w adempas 0.5mg TID as per cardio    Pt saturating 84% at rest on RA & 88% on 4L NC, home O2 being set up                2. NSVT    - Secondary to severe right heart failure     - c/w Toprol XL 25mg qday     - Continue telemetry monitoring    - Keep K>4, Mg>2    Cardio appreciated            3. Pulmonary Embolism    - RUL and RLL segmental non-obstructing PE noted on CTA 05/30/19    - CTA chest-ve for PE    V/Q scan done to r/o PE in distal pulm vasculature, was -ve for PE        4. Afib    - Currently in SB     - Toprol XL 25 daily    -Lovenox bridge to coumadin    Daily INR        5. Hemochromatosis    - Heme consult: no phlebotomy needed at this time. will resume phlebotomies as outpatient            Severe TR-    -was being worked up as an outpatient for sx     TTE: EF 40 to 45%.Grade I diastolic dysfunction. Severely enlarged right ventricle. Severely reduced RV systolic function.Moderate-severe tricuspid regurgitation. Moderate aortic regurgitation. Moderate to severe pulmonic valve regurgitation.     severe pulmonary hypertension.    -would await MRI and clearance from neuro prior to any surgical interventions     CTS follow up as outpt    No evidence of cirrhosis on labs    CT A/P done to r/o cirrhosis, negative for cirrhosis    Hep panel -ve            ETOH abuse-    US with hepatomegaly & mild nodularity likely form hemochromatosis, no lab evidence of cirrhosis    hep panel -ve    CIWA protocol    Thiamine folate    SW consult        VSS. Medically stable for d/c home with home care & O2.         PHYSICAL EXAM:        GENERAL: NAD, well-groomed, well-developed    HEAD:  Atraumatic, Normocephalic    EYES: EOMI, PERRLA, conjunctiva and sclera clear    ENMT: Moist mucous membranes    NECK: Supple, No JVD    NERVOUS SYSTEM:  Alert & Oriented X3, Motor Strength 5/5 B/L upper and lower extremities; DTRs 2+ intact and symmetric    CHEST/LUNG: Clear to auscultation bilaterally; No rales, rhonchi, wheezing, or rubs    HEART: Regular rate and rhythm; No murmurs, rubs, or gallops    ABDOMEN: Soft, Nontender, Nondistended; Bowel sounds present    EXTREMITIES:  2+ Peripheral Pulses, No clubbing, cyanosis, or edema    Right groin site intact 79 y/o M with a PMHx of pulmonary hypertension, severe TR (on STANLEY 06/14/19), large PFO, Afib on coumadin, Pulmonary embolism (CTA 05/30/19), PNA in 2008 complicated by septic multisystem organ failure requiring HD, resolved, Hemochromatosis, HTN, HLD, and who [facility] with questionable syncope with right SAH. Patient states that he was drinking beers and a double screwdriver yesterday, and thinks he slipped and fell on the floor. Unclear if patient had a syncopal event or not, patient doesn't think he passed out, but can't be completely sure. Patient states that he has been experiencing dyspnea on exertion for a long time, being worked up by Pulm, CT surgery, and Cardiology. Patient  is currently resting comfortably, denies SOB at rest. Patient hasn't followed up with his Cardiologist since his recent STANLEY. Patient denies any fevers, chills, CP, LE swelling, headache, dizziness, abdominal pain, N/V/D, vision changes, unilateral weakness.             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    - RHC: Severe pulmonary hypertension- 80 systolic. Wedge normal. L to R shunt with PFO/ASD.     - c/w adempas 0.5mg TID as per cardio    Pt saturating 84% at rest on RA & 88% on 4L NC, home O2 being set up                2. NSVT    - Secondary to severe right heart failure     - c/w Toprol XL 25mg qday     - Continue telemetry monitoring    - Keep K>4, Mg>2    Cardio appreciated            3. Pulmonary Embolism    - RUL and RLL segmental non-obstructing PE noted on CTA 05/30/19    - CTA chest-ve for PE    V/Q scan done to r/o PE in distal pulm vasculature, was -ve for PE        4. Afib    - Currently in SB     - Toprol XL 25 daily    -Lovenox bridge to coumadin    Daily INR        5. Hemochromatosis    - Heme consult: no phlebotomy needed at this time. will resume phlebotomies as outpatient            Severe TR-    -was being worked up as an outpatient for sx     TTE: EF 40 to 45%.Grade I diastolic dysfunction. Severely enlarged right ventricle. Severely reduced RV systolic function.Moderate-severe tricuspid regurgitation. Moderate aortic regurgitation. Moderate to severe pulmonic valve regurgitation.     severe pulmonary hypertension.    -would await MRI and clearance from neuro prior to any surgical interventions     CTS follow up as outpt    No evidence of cirrhosis on labs    CT A/P done to r/o cirrhosis, negative for cirrhosis    Hep panel -ve            ETOH abuse-    US with hepatomegaly & mild nodularity likely form hemochromatosis, no lab evidence of cirrhosis    hep panel -ve    CIWA protocol    Thiamine folate    SW consult        VSS. Medically stable for d/c home with home care & O2.         PHYSICAL EXAM:        GENERAL: NAD, well-groomed, well-developed    HEAD:  Atraumatic, Normocephalic    EYES: EOMI, PERRLA, conjunctiva and sclera clear    ENMT: Moist mucous membranes    NECK: Supple, No JVD    NERVOUS SYSTEM:  Alert & Oriented X3, Motor Strength 5/5 B/L upper and lower extremities; DTRs 2+ intact and symmetric    CHEST/LUNG: Clear to auscultation bilaterally; No rales, rhonchi, wheezing, or rubs    HEART: Regular rate and rhythm; No murmurs, rubs, or gallops    ABDOMEN: Soft, Nontender, Nondistended; Bowel sounds present    EXTREMITIES:  2+ Peripheral Pulses, No clubbing, cyanosis, or edema    Right groin site intact            Tried calling pt's PMD Dr. Melissa [phone], for updating on AC, await call back

## 2019-06-21 NOTE — DISCHARGE NOTE NURSING/CASE MANAGEMENT/SOCIAL WORK - NSDCDPATPORTLINK_GEN_ALL_CORE
You can access the [facility] Patient Portal, offered by [facility], by registering with the following website: [email or web address]

## 2019-06-21 NOTE — PROGRESS NOTE ADULT - SUBJECTIVE AND OBJECTIVE BOX
CHIEF COMPLAINT:Patient is a 78y old  Male who presents with a chief complaint of Admitted 6/17 questionable syncope and fall found with possible small high medial parietal SAH; transferred to [facility] for further evaluation (20 Jun 2019 09:36)    INTERVAL HISTORY:  pt initially thought to have CTEPH. CT and VQ negative.   PAH, with normal left sided pressure and TR.   On Adempas.  Doing better, on home o2 as well.     Allergies  No Known Allergies  	  MEDICATIONS:  metoprolol succinate ER 25 milliGRAM(s) Oral daily  riociguat 0.5 milliGRAM(s) Oral three times a day  acetaminophen   Tablet .. 650 milliGRAM(s) Oral every 6 hours  citalopram 20 milliGRAM(s) Oral daily  LORazepam   Injectable 2 milliGRAM(s) IV Push every 2 hours PRN  melatonin 3 milliGRAM(s) Oral at bedtime  ondansetron Injectable 4 milliGRAM(s) IV Push every 6 hours PRN  docusate sodium 100 milliGRAM(s) Oral every 8 hours  pantoprazole  Injectable 40 milliGRAM(s) IV Push daily  senna 2 Tablet(s) Oral at bedtime  atorvastatin 40 milliGRAM(s) Oral at bedtime  aspirin  chewable 81 milliGRAM(s) Oral daily  chlorhexidine 2% Cloths 1 Application(s) Topical <User Schedule>  enoxaparin Injectable 65 milliGRAM(s) SubCutaneous every 12 hours  folic acid 1 milliGRAM(s) Oral daily  thiamine 100 milliGRAM(s) Oral daily    PHYSICAL EXAM:  T(C): 36.9 (06-21-19 @ 05:53), Max: 37 (06-20-19 @ 21:25)  HR: 70 (06-21-19 @ 05:53) (64 - 73)  BP: 97/58 (06-21-19 @ 05:53) (90/53 - 108/64)  RR: 18 (06-21-19 @ 05:53) (18 - 22)  SpO2: 92% (06-21-19 @ 05:53) (91% - 92%)  20 Jun 2019 07:01  -  21 Jun 2019 07:00  --------------------------------------------------------  IN: 480 mL / OUT: 600 mL / NET: -120 mL  Appearance: Normal	  HEENT:   NC/AT  Eye: Pink Conjunctiva  Lungs: CTA B/L  CVS: RRR, Normal S1 and S2, No Edema  Pulses: Normal distal pulses.  Neuro: A&O x3    LABS:	 	                        13.2   5.1   )-----------( 220      ( 21 Jun 2019 06:14 )             41.2     06-21    142  |  99  |  16.0  ----------------------------<  92  4.1   |  29.0  |  0.73    Ca    9.0      21 Jun 2019 06:14  Phos  4.1     06-21  Mg     1.7     06-21    ASSESSMENT/PLAN: 	  1. pt with CVA, on ASA  2. Two episodes of PE, on AC, INR managed by pmd.  3. cont with adempas  4. Home o2  5. pt has adempas waiting for him at home  6. he will see me in about 2 weeks

## 2019-06-27 ENCOUNTER — MEDICATION RENEWAL (OUTPATIENT)
Age: 79
End: 2019-06-27

## 2019-07-02 ENCOUNTER — CHART COPY (OUTPATIENT)
Age: 79
End: 2019-07-02

## 2019-07-09 DIAGNOSIS — I62.9 NONTRAUMATIC INTRACRANIAL HEMORRHAGE, UNSPECIFIED: ICD-10-CM

## 2019-07-09 DIAGNOSIS — S06.6X0A TRAUMATIC SUBARACHNOID HEMORRHAGE W/OUT LOSS OF CONSCIOUSNESS, INITIAL ENCOUNTER: ICD-10-CM

## 2019-07-10 ENCOUNTER — NON-APPOINTMENT (OUTPATIENT)
Age: 79
End: 2019-07-10

## 2019-07-10 ENCOUNTER — APPOINTMENT (OUTPATIENT)
Dept: CARDIOLOGY | Facility: CLINIC | Age: 79
End: 2019-07-10
Payer: MEDICARE

## 2019-07-10 VITALS
OXYGEN SATURATION: 90 % | DIASTOLIC BLOOD PRESSURE: 60 MMHG | SYSTOLIC BLOOD PRESSURE: 94 MMHG | HEIGHT: 68 IN | HEART RATE: 76 BPM | BODY MASS INDEX: 22.43 KG/M2 | WEIGHT: 148 LBS

## 2019-07-10 VITALS — SYSTOLIC BLOOD PRESSURE: 100 MMHG | DIASTOLIC BLOOD PRESSURE: 65 MMHG

## 2019-07-10 VITALS — SYSTOLIC BLOOD PRESSURE: 102 MMHG | DIASTOLIC BLOOD PRESSURE: 65 MMHG

## 2019-07-10 DIAGNOSIS — F32.9 MAJOR DEPRESSIVE DISORDER, SINGLE EPISODE, UNSPECIFIED: ICD-10-CM

## 2019-07-10 PROCEDURE — 93000 ELECTROCARDIOGRAM COMPLETE: CPT

## 2019-07-10 PROCEDURE — 99214 OFFICE O/P EST MOD 30 MIN: CPT

## 2019-07-10 RX ORDER — LOVASTATIN 10 MG
10 TABLET ORAL
Refills: 0 | Status: DISCONTINUED | COMMUNITY
End: 2019-07-10

## 2019-07-10 RX ORDER — WARFARIN 3 MG/1
3 TABLET ORAL
Refills: 0 | Status: DISCONTINUED | COMMUNITY
End: 2019-07-10

## 2019-07-10 RX ORDER — MIRTAZAPINE 15 MG/1
15 TABLET, FILM COATED ORAL
Qty: 30 | Refills: 0 | Status: DISCONTINUED | COMMUNITY
Start: 2019-05-31

## 2019-07-10 RX ORDER — ALBUTEROL SULFATE 5 MG/ML
SOLUTION, NON-ORAL INHALATION
Refills: 0 | Status: DISCONTINUED | COMMUNITY
End: 2019-07-10

## 2019-07-10 RX ORDER — FLUTICASONE PROPIONATE AND SALMETEROL 50; 250 UG/1; UG/1
250-50 POWDER RESPIRATORY (INHALATION)
Refills: 0 | Status: DISCONTINUED | COMMUNITY
End: 2019-07-10

## 2019-07-10 RX ORDER — FLUTICASONE FUROATE AND VILANTEROL TRIFENATATE 100; 25 UG/1; UG/1
100-25 POWDER RESPIRATORY (INHALATION)
Refills: 0 | Status: DISCONTINUED | COMMUNITY
End: 2019-07-10

## 2019-07-10 RX ORDER — METOPROLOL TARTRATE 25 MG/1
25 TABLET, FILM COATED ORAL
Refills: 0 | Status: DISCONTINUED | COMMUNITY
End: 2019-07-10

## 2019-07-10 NOTE — HISTORY OF PRESENT ILLNESS
[FreeTextEntry1] : 80 yo male with severe pulmonary HTN seen in the hospital after a mechanical fall. Pt was seen by neurosurgery. He was on warfarin due to prior CVA and afib. His history is significant for a large PFO, severe TR and pulmonary hypertension. \par \par RHC:\par LA: 10/6/5\par PA: 75/26/48\par PCWP 48/16/10\par RA: 12/11/10\par RV 79/16\par PVR= 10 HERNANDEZ. \par QP/Qs 3.2/1.87=1.7.\par \par VQ scan was negative. Pt was started on Adempas 0.5 mg tid. He is tolerating it well and is also on home o2. \par No significant changes to his symptoms yet. \par

## 2019-07-10 NOTE — REVIEW OF SYSTEMS
[Recent Weight Gain (___ Lbs)] : no recent weight gain [Feeling Fatigued] : feeling fatigued [Recent Weight Loss (___ Lbs)] : no recent weight loss [Discharge From The Ears] : no discharge from the ears [Mouth Sores] : no mouth sores [Sore Throat] : no sore throat [Dyspnea on exertion] : dyspnea during exertion [Shortness Of Breath] : no shortness of breath [Chest Pain] : no chest pain [Lower Ext Edema] : no extremity edema [Palpitations] : no palpitations [Cough] : cough [Wheezing] : no wheezing [Nausea] : no nausea [Urinary Frequency] : no change in urinary frequency [Change in Appetite] : no change in appetite [Dysphagia] : no dysphagia [Impotence] : no impotence [Confusion] : no confusion was observed [Anxiety] : no anxiety [Excessive Thirst] : no polydipsia [Easy Bleeding] : no tendency for easy bleeding [Easy Bruising] : no tendency for easy bruising

## 2019-07-10 NOTE — ASSESSMENT
[FreeTextEntry1] : Hx of CVA, possible paradoxical emboli\par 2. Increase adempass to 1 mg tid as tolerated\par 3. cont with o2\par 4. On warfarin, managed by pmd\par 5. PFO, will need closure with Qp/Qs of 1.7\par 6. Monitor pt for improvement of pulmonary htn\par 7. pt is advised to call me with any symptoms changes\par fu in 4 weeks

## 2019-07-10 NOTE — PHYSICAL EXAM
[Well Groomed] : well groomed [General Appearance - In No Acute Distress] : no acute distress [Heart Rate And Rhythm] : heart rate and rhythm were normal [Heart Sounds] : normal S1 and S2 [Arterial Pulses Normal] : the arterial pulses were normal [FreeTextEntry1] : 3/6 sm lsb, radiates to axilla  [Auscultation Breath Sounds / Voice Sounds] : lungs were clear to auscultation bilaterally [Bowel Sounds] : normal bowel sounds [Abdomen Soft] : soft [Nail Clubbing] : no clubbing of the fingernails [Oriented To Time, Place, And Person] : oriented to person, place, and time [Affect] : the affect was normal

## 2019-07-12 ENCOUNTER — RX RENEWAL (OUTPATIENT)
Age: 79
End: 2019-07-12

## 2019-07-16 ENCOUNTER — APPOINTMENT (OUTPATIENT)
Dept: CARDIOTHORACIC SURGERY | Facility: CLINIC | Age: 79
End: 2019-07-16
Payer: MEDICARE

## 2019-08-20 RX ORDER — METOPROLOL SUCCINATE 25 MG/1
25 TABLET, EXTENDED RELEASE ORAL
Refills: 0 | Status: ACTIVE | COMMUNITY
Start: 2019-06-21

## 2019-08-20 RX ORDER — CITALOPRAM 20 MG/1
20 TABLET, FILM COATED ORAL
Refills: 0 | Status: ACTIVE | COMMUNITY

## 2019-08-21 ENCOUNTER — APPOINTMENT (OUTPATIENT)
Dept: CARDIOTHORACIC SURGERY | Facility: CLINIC | Age: 79
End: 2019-08-21
Payer: MEDICARE

## 2019-08-21 ENCOUNTER — NON-APPOINTMENT (OUTPATIENT)
Age: 79
End: 2019-08-21

## 2019-08-21 ENCOUNTER — APPOINTMENT (OUTPATIENT)
Dept: CARDIOLOGY | Facility: CLINIC | Age: 79
End: 2019-08-21
Payer: MEDICARE

## 2019-08-21 VITALS
HEIGHT: 68 IN | WEIGHT: 145 LBS | BODY MASS INDEX: 21.98 KG/M2 | OXYGEN SATURATION: 96 % | DIASTOLIC BLOOD PRESSURE: 58 MMHG | RESPIRATION RATE: 16 BRPM | SYSTOLIC BLOOD PRESSURE: 106 MMHG | HEART RATE: 76 BPM

## 2019-08-21 VITALS
SYSTOLIC BLOOD PRESSURE: 100 MMHG | WEIGHT: 145 LBS | HEIGHT: 68 IN | BODY MASS INDEX: 21.98 KG/M2 | OXYGEN SATURATION: 95 % | HEART RATE: 79 BPM | DIASTOLIC BLOOD PRESSURE: 60 MMHG

## 2019-08-21 DIAGNOSIS — E78.5 HYPERLIPIDEMIA, UNSPECIFIED: ICD-10-CM

## 2019-08-21 PROCEDURE — 99213 OFFICE O/P EST LOW 20 MIN: CPT

## 2019-08-21 PROCEDURE — 99214 OFFICE O/P EST MOD 30 MIN: CPT | Mod: 25

## 2019-08-21 PROCEDURE — 93000 ELECTROCARDIOGRAM COMPLETE: CPT

## 2019-08-21 NOTE — HISTORY OF PRESENT ILLNESS
[FreeTextEntry1] : Mr. ZOHU is a 78 year old male  His past medical history includes HTN, HLD, afib (Warfarin), pneumonia 2008 complicated by septic multisystem organ failure requiring hemodialysis (now recovered).  \par \par He presents to the office today to discuss surgical options.\par \par He was diagnosed with PHTN after cardiac cath was preformed.  He was also evaluated by Dr Palacios (Pulmonology) who suggested cardiac surgery evaluation.\par \par Cath and ECHO were obtained in September and October of 2018.  ECHO demonstrates LVEF 50%, moderate MR, severe pulmonary HTN, RA pressures 6-10mmHg, moderate to severe tricuspid regurgitation and severe right ventricular dysfunction. Cardiac Cath reveals normal coronary arteries with PAP of 55/20, and PCWP 8mmHg. He is on home oxygen. \par \par

## 2019-08-21 NOTE — REVIEW OF SYSTEMS
[Feeling Poorly] : feeling poorly [Shortness Of Breath] : shortness of breath [Orthopnea] : orthopnea [SOB on Exertion] : shortness of breath during exertion [Negative] : Heme/Lymph

## 2019-08-21 NOTE — PHYSICAL EXAM
[Neck Appearance] : the appearance of the neck was normal [Sclera] : the sclera and conjunctiva were normal [] : no respiratory distress [Apical Impulse] : the apical impulse was normal [Exaggerated Use Of Accessory Muscles For Inspiration] : no accessory muscle use [FreeTextEntry1] : III/VI systolic murmru at the  [Heart Sounds] : normal S1 and S2 [Examination Of The Chest] : the chest was normal in appearance [Arterial Pulses Carotid] : carotid pulses were normal with no bruits [Arterial Pulses Femoral] : femoral pulses were normal without bruits [Breast Appearance] : normal in appearance [Abdomen Soft] : soft [Bowel Sounds] : normal bowel sounds [Cervical Lymph Nodes Enlarged Posterior Bilaterally] : posterior cervical [Abnormal Walk] : normal gait [Skin Color & Pigmentation] : normal skin color and pigmentation [Cranial Nerves] : cranial nerves 2-12 were intact

## 2019-08-21 NOTE — CONSULT LETTER
[Dear  ___] : Dear  [unfilled], [Consult Closing:] : Thank you very much for allowing me to participate in the care of this patient.  If you have any questions, please do not hesitate to contact me. [Consult Letter:] : I had the pleasure of evaluating your patient, [unfilled]. [FreeTextEntry2] : MD Paulette [Sincerely,] : Sincerely, [FreeTextEntry3] : Ulysses Lofton MD\par

## 2019-09-05 ENCOUNTER — APPOINTMENT (OUTPATIENT)
Dept: CARDIOLOGY | Facility: CLINIC | Age: 79
End: 2019-09-05
Payer: MEDICARE

## 2019-09-05 VITALS
OXYGEN SATURATION: 91 % | SYSTOLIC BLOOD PRESSURE: 80 MMHG | WEIGHT: 147 LBS | HEIGHT: 68 IN | RESPIRATION RATE: 18 BRPM | BODY MASS INDEX: 22.28 KG/M2 | DIASTOLIC BLOOD PRESSURE: 50 MMHG

## 2019-09-05 VITALS — SYSTOLIC BLOOD PRESSURE: 84 MMHG | DIASTOLIC BLOOD PRESSURE: 52 MMHG

## 2019-09-05 DIAGNOSIS — I26.99 OTHER PULMONARY EMBOLISM W/OUT ACUTE COR PULMONALE: ICD-10-CM

## 2019-09-05 DIAGNOSIS — I10 ESSENTIAL (PRIMARY) HYPERTENSION: ICD-10-CM

## 2019-09-05 PROCEDURE — 99214 OFFICE O/P EST MOD 30 MIN: CPT

## 2019-09-05 RX ORDER — WARFARIN 4 MG/1
4 TABLET ORAL
Refills: 0 | Status: DISCONTINUED | COMMUNITY
Start: 2019-05-09 | End: 2019-09-05

## 2019-09-05 RX ORDER — WARFARIN 1 MG/1
1 TABLET ORAL
Refills: 0 | Status: ACTIVE | COMMUNITY
Start: 2019-07-08

## 2019-09-05 RX ORDER — WARFARIN 5 MG/1
5 TABLET ORAL
Qty: 30 | Refills: 2 | Status: ACTIVE | COMMUNITY
Start: 2019-07-09

## 2019-09-13 PROBLEM — I10 BENIGN ESSENTIAL HYPERTENSION: Status: ACTIVE | Noted: 2019-05-17

## 2019-09-13 PROBLEM — I26.99 PULMONARY EMBOLISM: Status: ACTIVE | Noted: 2019-07-09

## 2019-09-13 NOTE — PHYSICAL EXAM
[General Appearance - Well Developed] : well developed [Normal Conjunctiva] : the conjunctiva exhibited no abnormalities [Normal Oral Mucosa] : normal oral mucosa [Normal Jugular Venous V Waves Present] : normal jugular venous V waves present [] : no respiratory distress [Bowel Sounds] : normal bowel sounds [Abdomen Soft] : soft [FreeTextEntry1] : limited gait [Nail Clubbing] : no clubbing of the fingernails [Skin Color & Pigmentation] : normal skin color and pigmentation [Oriented To Time, Place, And Person] : oriented to person, place, and time

## 2019-09-13 NOTE — HISTORY OF PRESENT ILLNESS
[FreeTextEntry1] : 78 yo male with severe pulmonary HTN seen in the hospital after a mechanical fall. Pt was seen by neurosurgery. He was on warfarin due to prior CVA and afib. His history is significant for a large PFO, severe TR and pulmonary hypertension. \par \par RHC:\par LA: 10/6/5\par PA: 75/26/48\par PCWP 48/16/10\par RA: 12/11/10\par RV 79/16\par PVR= 10 HERNANDEZ. \par QP/Qs 3.2/1.87=1.7.\par \par Patient seen by Dr. Lofton for valvular pathology. Does not think that patient is a candidate. Referred to me by Dr. Palacios for PFO closure due to significant right to left shunt and poor oxygenation. \par

## 2019-09-13 NOTE — DISCUSSION/SUMMARY
[FreeTextEntry1] : Patient with history of CVA which could have been secondary to PFO or afib. Has a significant shunt which could be causing his hypoxemia. No evidence of Eisenmenger's physiology. \par ? of device therapy with PFO closure. \par Plan for PFO closure.

## 2019-09-20 ENCOUNTER — OUTPATIENT (OUTPATIENT)
Dept: OUTPATIENT SERVICES | Facility: HOSPITAL | Age: 79
LOS: 1 days | End: 2019-09-20
Payer: MEDICARE

## 2019-09-20 VITALS
HEART RATE: 78 BPM | OXYGEN SATURATION: 96 % | TEMPERATURE: 97 F | WEIGHT: 141.98 LBS | DIASTOLIC BLOOD PRESSURE: 70 MMHG | SYSTOLIC BLOOD PRESSURE: 112 MMHG | RESPIRATION RATE: 20 BRPM | HEIGHT: 68.5 IN

## 2019-09-20 VITALS — WEIGHT: 147.05 LBS | HEIGHT: 68 IN

## 2019-09-20 DIAGNOSIS — Z98.890 OTHER SPECIFIED POSTPROCEDURAL STATES: Chronic | ICD-10-CM

## 2019-09-20 DIAGNOSIS — S11.91XA LACERATION WITHOUT FOREIGN BODY OF UNSPECIFIED PART OF NECK, INITIAL ENCOUNTER: Chronic | ICD-10-CM

## 2019-09-20 DIAGNOSIS — Z96.641 PRESENCE OF RIGHT ARTIFICIAL HIP JOINT: Chronic | ICD-10-CM

## 2019-09-20 DIAGNOSIS — Z01.818 ENCOUNTER FOR OTHER PREPROCEDURAL EXAMINATION: ICD-10-CM

## 2019-09-20 DIAGNOSIS — Z83.6 FAMILY HISTORY OF OTHER DISEASES OF THE RESPIRATORY SYSTEM: Chronic | ICD-10-CM

## 2019-09-20 LAB
ANION GAP SERPL CALC-SCNC: 12 MMOL/L — SIGNIFICANT CHANGE UP (ref 5–17)
APTT BLD: 34.6 SEC — SIGNIFICANT CHANGE UP (ref 27.5–36.3)
BASOPHILS # BLD AUTO: 0.02 K/UL — SIGNIFICANT CHANGE UP (ref 0–0.2)
BASOPHILS NFR BLD AUTO: 0.3 % — SIGNIFICANT CHANGE UP (ref 0–2)
BLD GP AB SCN SERPL QL: SIGNIFICANT CHANGE UP
BUN SERPL-MCNC: 20 MG/DL — SIGNIFICANT CHANGE UP (ref 8–20)
CALCIUM SERPL-MCNC: 9.4 MG/DL — SIGNIFICANT CHANGE UP (ref 8.6–10.2)
CHLORIDE SERPL-SCNC: 99 MMOL/L — SIGNIFICANT CHANGE UP (ref 98–107)
CO2 SERPL-SCNC: 27 MMOL/L — SIGNIFICANT CHANGE UP (ref 22–29)
CREAT SERPL-MCNC: 0.7 MG/DL — SIGNIFICANT CHANGE UP (ref 0.5–1.3)
EOSINOPHIL # BLD AUTO: 0.1 K/UL — SIGNIFICANT CHANGE UP (ref 0–0.5)
EOSINOPHIL NFR BLD AUTO: 1.5 % — SIGNIFICANT CHANGE UP (ref 0–6)
GLUCOSE SERPL-MCNC: 95 MG/DL — SIGNIFICANT CHANGE UP (ref 70–115)
HCT VFR BLD CALC: 39.1 % — SIGNIFICANT CHANGE UP (ref 39–50)
HGB BLD-MCNC: 12.5 G/DL — LOW (ref 13–17)
IMM GRANULOCYTES NFR BLD AUTO: 0.3 % — SIGNIFICANT CHANGE UP (ref 0–1.5)
INR BLD: 1.73 RATIO — HIGH (ref 0.88–1.16)
LYMPHOCYTES # BLD AUTO: 1.27 K/UL — SIGNIFICANT CHANGE UP (ref 1–3.3)
LYMPHOCYTES # BLD AUTO: 19.6 % — SIGNIFICANT CHANGE UP (ref 13–44)
MAGNESIUM SERPL-MCNC: 1.9 MG/DL — SIGNIFICANT CHANGE UP (ref 1.6–2.6)
MCHC RBC-ENTMCNC: 30.4 PG — SIGNIFICANT CHANGE UP (ref 27–34)
MCHC RBC-ENTMCNC: 32 GM/DL — SIGNIFICANT CHANGE UP (ref 32–36)
MCV RBC AUTO: 95.1 FL — SIGNIFICANT CHANGE UP (ref 80–100)
MONOCYTES # BLD AUTO: 0.8 K/UL — SIGNIFICANT CHANGE UP (ref 0–0.9)
MONOCYTES NFR BLD AUTO: 12.3 % — SIGNIFICANT CHANGE UP (ref 2–14)
NEUTROPHILS # BLD AUTO: 4.27 K/UL — SIGNIFICANT CHANGE UP (ref 1.8–7.4)
NEUTROPHILS NFR BLD AUTO: 66 % — SIGNIFICANT CHANGE UP (ref 43–77)
PLATELET # BLD AUTO: 240 K/UL — SIGNIFICANT CHANGE UP (ref 150–400)
POTASSIUM SERPL-MCNC: 5 MMOL/L — SIGNIFICANT CHANGE UP (ref 3.5–5.3)
POTASSIUM SERPL-SCNC: 5 MMOL/L — SIGNIFICANT CHANGE UP (ref 3.5–5.3)
PROTHROM AB SERPL-ACNC: 20.2 SEC — HIGH (ref 10–12.9)
RBC # BLD: 4.11 M/UL — LOW (ref 4.2–5.8)
RBC # FLD: 15.1 % — HIGH (ref 10.3–14.5)
SODIUM SERPL-SCNC: 138 MMOL/L — SIGNIFICANT CHANGE UP (ref 135–145)
WBC # BLD: 6.48 K/UL — SIGNIFICANT CHANGE UP (ref 3.8–10.5)
WBC # FLD AUTO: 6.48 K/UL — SIGNIFICANT CHANGE UP (ref 3.8–10.5)

## 2019-09-20 PROCEDURE — 86901 BLOOD TYPING SEROLOGIC RH(D): CPT

## 2019-09-20 PROCEDURE — 93005 ELECTROCARDIOGRAM TRACING: CPT

## 2019-09-20 PROCEDURE — 85730 THROMBOPLASTIN TIME PARTIAL: CPT

## 2019-09-20 PROCEDURE — 83735 ASSAY OF MAGNESIUM: CPT

## 2019-09-20 PROCEDURE — 85610 PROTHROMBIN TIME: CPT

## 2019-09-20 PROCEDURE — 86850 RBC ANTIBODY SCREEN: CPT

## 2019-09-20 PROCEDURE — 85027 COMPLETE CBC AUTOMATED: CPT

## 2019-09-20 PROCEDURE — 80048 BASIC METABOLIC PNL TOTAL CA: CPT

## 2019-09-20 PROCEDURE — 86900 BLOOD TYPING SEROLOGIC ABO: CPT

## 2019-09-20 PROCEDURE — 93010 ELECTROCARDIOGRAM REPORT: CPT

## 2019-09-20 PROCEDURE — 36415 COLL VENOUS BLD VENIPUNCTURE: CPT

## 2019-09-20 PROCEDURE — G0463: CPT

## 2019-09-20 RX ORDER — ALBUTEROL 90 UG/1
2 AEROSOL, METERED ORAL
Qty: 0 | Refills: 0 | DISCHARGE

## 2019-09-20 RX ORDER — ENOXAPARIN SODIUM 100 MG/ML
60 INJECTION SUBCUTANEOUS
Qty: 4 | Refills: 0
Start: 2019-09-20 | End: 2019-09-21

## 2019-09-20 RX ORDER — FLUTICASONE PROPIONATE AND SALMETEROL 50; 250 UG/1; UG/1
1 POWDER ORAL; RESPIRATORY (INHALATION)
Qty: 0 | Refills: 0 | DISCHARGE

## 2019-09-20 NOTE — H&P PST ADULT - HISTORY OF PRESENT ILLNESS
78 y/o thin white male presents to PST with his wife. Pt is in a wheelchair, wearing 02 NC. 80 y/o thin white male presents to PST with his wife. Pt is in a wheelchair, wearing 02 NC.  He is known to have severe pulmonary hypertension with large know PFO based on June 2019 RHC showing left to right shunt with fraction of 1.8, severe TR.  Patient had been evaluated by CTS Dr Lofton and referred for PFO closure. Requires home 02    To have closure of large PFO with significant shunt. Has persistent hypoxemia, CVA and AF and is on anticoagulation with warfarin.      PMH: Mechanical fall, CVA with SAH, AFib, hemochromatosis, multisystem organ failure, sepsis, PNA HTN, HLD, PE, LHC 2018 Dr Diaz w/o PCI per pt. 78 y/o thin white male presents to PST with his wife. Pt is in a wheelchair, wearing 02 NC.  He is known to have severe pulmonary hypertension with large know PFO based on June 2019 RHC showing left to right shunt with fraction of 1.8, severe TR.  Patient had been evaluated by CTS Dr Lofton and referred for PFO closure. Requires home 02    To have closure of large PFO with significant shunt left to right shunt with fraction of 1.8,   . Has persistent hypoxemia, CVA and AF and is on anticoagulation with warfarin.      PMH: Mechanical fall, CVA with SAH, AFib, hemochromatosis, multisystem organ failure, sepsis, PNA HTN, HLD, PE, LHC 2018 Dr Diaz w/o PCI per pt.    ASA 3  Mallampati 2

## 2019-09-20 NOTE — ASU PATIENT PROFILE, ADULT - TEACHING/LEARNING LEARNING PREFERENCES
skill demonstration/pictorial/computer/internet/verbal instruction/written material/individual instruction

## 2019-09-20 NOTE — H&P PST ADULT - RS GEN PE MLT RESP DETAILS PC
no rhonchi/clear to auscultation bilaterally/respirations non-labored/no rales/airway patent/normal/breath sounds equal/no wheezes/good air movement

## 2019-09-20 NOTE — H&P PST ADULT - LAST ECHOCARDIOGRAM
June 2019 see HPT June 2019 see HPI EF 50-55% severely reduced RV SF, severe TR, severe PH,mod MR mild AR mild PI.  right to left shunting of PFO

## 2019-09-20 NOTE — H&P PST ADULT - NSICDXFAMILYHX_GEN_ALL_CORE_FT
FAMILY HISTORY:  Family history of dementia  Family history of diabetes mellitus  Family history of myocardial infarction

## 2019-09-20 NOTE — H&P PST ADULT - NSICDXPASTMEDICALHX_GEN_ALL_CORE_FT
PAST MEDICAL HISTORY:  Chronic atrial fibrillation     CVA (cerebrovascular accident)     Depression     Essential hypertension     ETOH abuse     Fall in elderly patient     H/O subarachnoid hemorrhage     Hemochromatosis     Hyperlipidemia, unspecified hyperlipidemia type     Mitral valve regurgitation     Non-rheumatic tricuspid valve insufficiency     Nonrheumatic aortic valve insufficiency     Oxygen dependent     PFO (patent foramen ovale)     PNA (pneumonia)     Pneumonia     Pulmonary emboli Non Obstructive May 2019    Pulmonary hypertension     Pulmonary hypertension, moderate to severe left to right shunt with fraction of 1.8 per June 2019 RHC    Sepsis oxygen      Severe tricuspid regurgitation     Vitamin D deficiency PAST MEDICAL HISTORY:  AI (aortic insufficiency)     Chronic atrial fibrillation     CVA (cerebrovascular accident)     Depression     Essential hypertension     ETOH abuse     Fall in elderly patient     H/O subarachnoid hemorrhage     Hemochromatosis     Hyperlipidemia, unspecified hyperlipidemia type     Hypoxemia     Mitral valve regurgitation     Moderate aortic valve regurgitation     Moderate mitral regurgitation     Non-rheumatic tricuspid valve insufficiency     Nonrheumatic aortic valve insufficiency     Oxygen dependent     PFO (patent foramen ovale) left to right shunt with fraction of 1.8    PNA (pneumonia)     Pneumonia     Pulmonary emboli Non Obstructive May 2019    Pulmonary hypertension     Pulmonary hypertension, moderate to severe left to right shunt with fraction of 1.8 per June 2019 RHC    Right ventricular hypertrophy     Sepsis oxygen      Severe tricuspid regurgitation     Vitamin D deficiency

## 2019-09-20 NOTE — H&P PST ADULT - NEUROLOGICAL DETAILS
responds to verbal commands/deep reflexes intact/cranial nerves intact/responds to pain/alert and oriented x 3/sensation intact

## 2019-09-20 NOTE — H&P PST ADULT - VENOUS THROMBOEMBOLISM CURRENT STATUS
(1) serious lung disease, including pneumonia (within 1 month)/(1) other risk factor (includes escalating BMI, pack-years of smoking, diabetes requiring insulin, chemotherapy, female gender and length of surgery)

## 2019-09-23 DIAGNOSIS — I35.1 NONRHEUMATIC AORTIC (VALVE) INSUFFICIENCY: Chronic | ICD-10-CM

## 2019-09-26 ENCOUNTER — APPOINTMENT (OUTPATIENT)
Dept: CARDIOLOGY | Facility: CLINIC | Age: 79
End: 2019-09-26

## 2019-09-30 ENCOUNTER — INPATIENT (INPATIENT)
Facility: HOSPITAL | Age: 79
LOS: 0 days | Discharge: HOME | DRG: 274 | End: 2019-10-01
Attending: INTERNAL MEDICINE | Admitting: INTERNAL MEDICINE
Payer: MEDICARE

## 2019-09-30 ENCOUNTER — TRANSCRIPTION ENCOUNTER (OUTPATIENT)
Age: 79
End: 2019-09-30

## 2019-09-30 VITALS
WEIGHT: 145.95 LBS | HEART RATE: 66 BPM | SYSTOLIC BLOOD PRESSURE: 115 MMHG | HEIGHT: 68 IN | OXYGEN SATURATION: 98 % | DIASTOLIC BLOOD PRESSURE: 59 MMHG | TEMPERATURE: 98 F | RESPIRATION RATE: 20 BRPM

## 2019-09-30 DIAGNOSIS — Z98.890 OTHER SPECIFIED POSTPROCEDURAL STATES: Chronic | ICD-10-CM

## 2019-09-30 DIAGNOSIS — Z96.641 PRESENCE OF RIGHT ARTIFICIAL HIP JOINT: Chronic | ICD-10-CM

## 2019-09-30 DIAGNOSIS — S11.91XA LACERATION WITHOUT FOREIGN BODY OF UNSPECIFIED PART OF NECK, INITIAL ENCOUNTER: Chronic | ICD-10-CM

## 2019-09-30 DIAGNOSIS — Z83.6 FAMILY HISTORY OF OTHER DISEASES OF THE RESPIRATORY SYSTEM: Chronic | ICD-10-CM

## 2019-09-30 DIAGNOSIS — I63.9 CEREBRAL INFARCTION, UNSPECIFIED: ICD-10-CM

## 2019-09-30 LAB
APTT BLD: >200 SEC — CRITICAL HIGH (ref 27.5–36.3)
INR BLD: 1.32 RATIO — HIGH (ref 0.88–1.16)
PROTHROM AB SERPL-ACNC: 15.3 SEC — HIGH (ref 10–12.9)

## 2019-09-30 PROCEDURE — 93662 INTRACARDIAC ECG (ICE): CPT | Mod: 26

## 2019-09-30 PROCEDURE — 99223 1ST HOSP IP/OBS HIGH 75: CPT | Mod: 25

## 2019-09-30 PROCEDURE — 93308 TTE F-UP OR LMTD: CPT | Mod: 26

## 2019-09-30 PROCEDURE — 93010 ELECTROCARDIOGRAM REPORT: CPT

## 2019-09-30 PROCEDURE — 93580 TRANSCATH CLOSURE OF ASD: CPT

## 2019-09-30 RX ORDER — GLUCOSAMINE/CHONDROITIN/C/MANG 500-400 MG
3 CAPSULE ORAL
Qty: 0 | Refills: 0 | DISCHARGE

## 2019-09-30 RX ORDER — DIPHENHYDRAMINE HCL 50 MG
25 CAPSULE ORAL ONCE
Refills: 0 | Status: COMPLETED | OUTPATIENT
Start: 2019-09-30 | End: 2019-09-30

## 2019-09-30 RX ORDER — CLOPIDOGREL BISULFATE 75 MG/1
600 TABLET, FILM COATED ORAL ONCE
Refills: 0 | Status: COMPLETED | OUTPATIENT
Start: 2019-09-30 | End: 2019-09-30

## 2019-09-30 RX ORDER — ASPIRIN/CALCIUM CARB/MAGNESIUM 324 MG
81 TABLET ORAL ONCE
Refills: 0 | Status: COMPLETED | OUTPATIENT
Start: 2019-09-30 | End: 2019-09-30

## 2019-09-30 RX ORDER — CLOPIDOGREL BISULFATE 75 MG/1
75 TABLET, FILM COATED ORAL DAILY
Refills: 0 | Status: DISCONTINUED | OUTPATIENT
Start: 2019-10-01 | End: 2019-10-01

## 2019-09-30 RX ORDER — WARFARIN SODIUM 2.5 MG/1
1 TABLET ORAL
Qty: 0 | Refills: 0 | DISCHARGE

## 2019-09-30 RX ORDER — SODIUM CHLORIDE 9 MG/ML
1000 INJECTION INTRAMUSCULAR; INTRAVENOUS; SUBCUTANEOUS
Refills: 0 | Status: COMPLETED | OUTPATIENT
Start: 2019-09-30 | End: 2019-09-30

## 2019-09-30 RX ORDER — RIOCIGUAT 1.5 MG/1
2 TABLET, FILM COATED ORAL THREE TIMES A DAY
Refills: 0 | Status: DISCONTINUED | OUTPATIENT
Start: 2019-09-30 | End: 2019-10-01

## 2019-09-30 RX ORDER — ACETAMINOPHEN 500 MG
650 TABLET ORAL EVERY 6 HOURS
Refills: 0 | Status: DISCONTINUED | OUTPATIENT
Start: 2019-09-30 | End: 2019-10-01

## 2019-09-30 RX ORDER — CHOLECALCIFEROL (VITAMIN D3) 125 MCG
1 CAPSULE ORAL
Qty: 0 | Refills: 0 | DISCHARGE

## 2019-09-30 RX ORDER — ATORVASTATIN CALCIUM 80 MG/1
40 TABLET, FILM COATED ORAL AT BEDTIME
Refills: 0 | Status: DISCONTINUED | OUTPATIENT
Start: 2019-09-30 | End: 2019-10-01

## 2019-09-30 RX ORDER — RIOCIGUAT 1.5 MG/1
1 TABLET, FILM COATED ORAL
Qty: 0 | Refills: 0 | DISCHARGE

## 2019-09-30 RX ORDER — CITALOPRAM 10 MG/1
1 TABLET, FILM COATED ORAL
Qty: 0 | Refills: 0 | DISCHARGE

## 2019-09-30 RX ORDER — METOPROLOL TARTRATE 50 MG
25 TABLET ORAL DAILY
Refills: 0 | Status: DISCONTINUED | OUTPATIENT
Start: 2019-09-30 | End: 2019-10-01

## 2019-09-30 RX ORDER — CITALOPRAM 10 MG/1
20 TABLET, FILM COATED ORAL DAILY
Refills: 0 | Status: DISCONTINUED | OUTPATIENT
Start: 2019-09-30 | End: 2019-10-01

## 2019-09-30 RX ORDER — WARFARIN SODIUM 2.5 MG/1
2.5 TABLET ORAL ONCE
Refills: 0 | Status: COMPLETED | OUTPATIENT
Start: 2019-09-30 | End: 2019-09-30

## 2019-09-30 RX ORDER — CHOLECALCIFEROL (VITAMIN D3) 125 MCG
1000 CAPSULE ORAL DAILY
Refills: 0 | Status: DISCONTINUED | OUTPATIENT
Start: 2019-09-30 | End: 2019-10-01

## 2019-09-30 RX ADMIN — Medication 650 MILLIGRAM(S): at 22:30

## 2019-09-30 RX ADMIN — ATORVASTATIN CALCIUM 40 MILLIGRAM(S): 80 TABLET, FILM COATED ORAL at 21:35

## 2019-09-30 RX ADMIN — Medication 1000 UNIT(S): at 14:32

## 2019-09-30 RX ADMIN — CLOPIDOGREL BISULFATE 600 MILLIGRAM(S): 75 TABLET, FILM COATED ORAL at 11:34

## 2019-09-30 RX ADMIN — WARFARIN SODIUM 2.5 MILLIGRAM(S): 2.5 TABLET ORAL at 21:34

## 2019-09-30 RX ADMIN — SODIUM CHLORIDE 30 MILLILITER(S): 9 INJECTION INTRAMUSCULAR; INTRAVENOUS; SUBCUTANEOUS at 14:05

## 2019-09-30 RX ADMIN — CITALOPRAM 20 MILLIGRAM(S): 10 TABLET, FILM COATED ORAL at 14:32

## 2019-09-30 RX ADMIN — Medication 25 MILLIGRAM(S): at 21:35

## 2019-09-30 RX ADMIN — RIOCIGUAT 2 MILLIGRAM(S): 1.5 TABLET, FILM COATED ORAL at 16:12

## 2019-09-30 RX ADMIN — Medication 650 MILLIGRAM(S): at 21:34

## 2019-09-30 RX ADMIN — Medication 81 MILLIGRAM(S): at 11:27

## 2019-09-30 RX ADMIN — SODIUM CHLORIDE 30 MILLILITER(S): 9 INJECTION INTRAMUSCULAR; INTRAVENOUS; SUBCUTANEOUS at 11:27

## 2019-09-30 NOTE — DISCHARGE NOTE PROVIDER - HOSPITAL COURSE
79 year old male with severe pHTN on O2 and known left to right shunt s/p successful PFO closure. 78 y/o thin white male presents to PST with his wife. Pt is in a wheelchair, wearing 02 NC.  He is known to have severe pulmonary hypertension with large know PFO based on June 2019 RHC showing left to right shunt with fraction of 1.8, severe TR.  Patient had been evaluated by CTS Dr Lofton and referred for PFO closure. Requires home 02    Physical Exam:    · Constitutional    detailed exam    · Constitutional Details    well-developed; well-groomed; well-nourished; thin build    · Eyes    detailed exam    · Eyes Details    PERRL; EOMI; conjunctiva clear    · ENMT    No oral lesions; no gross abnormalities    · Neck    detailed exam    · Neck Details    normal; supple; no JVD    · Breasts    not examined    · Back    not examined    · Respiratory    detailed exam    · Respiratory Details    normal; airway patent; breath sounds equal; good air movement; respirations non-labored; clear to auscultation bilaterally; no rales; no rhonchi; no wheezes    · Cardiovascular    detailed exam    · Cardiovascular Details    regular rate and rhythm  no murmur  normal PMI    · Cardiovascular Details    positive S1    · Gastrointestinal    detailed exam    · GI Normal    normal; soft; nontender    · Genitourinary    not examined    · Rectal    not examined    · Extremities    detailed exam    · Extremities Details    clubbing; cyanosis    · Extremities Comments    stephon hands    · Vascular    detailed exam    · Radial Pulse    right normal; left normal    · DP Pulse    right normal; left normal    · PT Pulse    right normal; left normal    · Neurological    detailed exam    · Neurological Details    alert and oriented x 3; responds to pain; responds to verbal commands; sensation intact; deep reflexes intact; cranial nerves intact    · Skin    detailed exam    · Skin Details    warm and dry    · Lymph Nodes    No lymphadedenopathy    · Musculoskeletal    detailed exam    · Musculoskeletal Details    ROM intact    ICU Vital Signs Last 24 Hrs    HR: 87 (01 Oct 2019 08:33) (59 - 98)    BP: 59/- (01 Oct 2019 08:33) (59/- - 122/64)    RR: 19 (01 Oct 2019 08:33) (18 - 20)    SpO2: 95% (01 Oct 2019 08:33) (92% - 98%)            79 year old male with severe pHTN on O2 and known left to right shunt s/p successful PFO closure.     TTE 80 y/o thin white male presents to PST with his wife. Pt is in a wheelchair, wearing 02 NC.  He is known to have severe pulmonary hypertension with large know PFO based on June 2019 RHC showing left to right shunt with fraction of 1.8, severe TR.  Patient had been evaluated by CTS Dr Lofton and referred for PFO closure. Requires home 02    Physical Exam:    · Constitutional    detailed exam    · Constitutional Details    well-developed; well-groomed; well-nourished; thin build    · Eyes    detailed exam    · Eyes Details    PERRL; EOMI; conjunctiva clear    · ENMT    No oral lesions; no gross abnormalities    · Neck    detailed exam    · Neck Details    normal; supple; no JVD    · Breasts    not examined    · Back    not examined    · Respiratory    detailed exam    · Respiratory Details    normal; airway patent; breath sounds equal; good air movement; respirations non-labored; clear to auscultation bilaterally; no rales; no rhonchi; no wheezes    · Cardiovascular    detailed exam    · Cardiovascular Details    regular rate and rhythm  no murmur  normal PMI    · Cardiovascular Details    positive S1    · Gastrointestinal    detailed exam    · GI Normal    normal; soft; nontender    · Genitourinary    not examined    · Rectal    not examined    · Extremities    detailed exam    · Extremities Details    clubbing; cyanosis    · Extremities Comments    stephon hands    · Vascular    detailed exam    · Radial Pulse    right normal; left normal    · DP Pulse    right normal; left normal    · PT Pulse    right normal; left normal    · Neurological    detailed exam    · Neurological Details    alert and oriented x 3; responds to pain; responds to verbal commands; sensation intact; deep reflexes intact; cranial nerves intact    · Skin    detailed exam    · Skin Details    warm and dry    · Lymph Nodes    No lymphadedenopathy    · Musculoskeletal    detailed exam    · Musculoskeletal Details    ROM intact    ICU Vital Signs Last 24 Hrs    HR: 87 (01 Oct 2019 08:33) (59 - 98)    BP: 59/- (01 Oct 2019 08:33) (59/- - 122/64)    RR: 19 (01 Oct 2019 08:33) (18 - 20)    SpO2: 95% (01 Oct 2019 08:33) (92% - 98%)            79 year old male with severe pHTN on O2 and known left to right shunt s/p successful PFO closure.     TTE < from: TTE Echo Limited or F/U (09.30.19 @ 18:22) >         1. Left ventricular ejection fraction, by visual estimation, is 55 to     60%.     2. Normal global left ventricular systolic function.     3. Moderately enlarged right ventricle.     4. Severely reduced RV systolic function.     5. There is no evidence of pericardial effusion.     6. Mild mitral annular calcification.     7. Thickening of the anterior and posterior mitral valve leaflets.     8. Mild aortic regurgitation.     9. Sclerotic aortic valve with normal opening.    10. Mild to moderate pulmonic valve regurgitation.    11. Estimated pulmonary artery systolic pressure is 56.3 mmHg assuming a     right atrial pressure of 3 mmHg, which is consistent with moderate     pulmonary hypertension.    12. Mildly dilated pulmonary artery.    13. S/P PFO closure. PFO closure visualized.

## 2019-09-30 NOTE — PROGRESS NOTE ADULT - SUBJECTIVE AND OBJECTIVE BOX
Nurse Practitioner Progress note:     INTERVAL HISTORY: 79 year old male with left to right shunt for PFO closure    MEDICATIONS:  metoprolol succinate ER 25 milliGRAM(s) Oral daily  riociguat 2 milliGRAM(s) Oral three times a day  citalopram 20 milliGRAM(s) Oral daily  atorvastatin 40 milliGRAM(s) Oral at bedtime  cholecalciferol 1000 Unit(s) Oral daily  sodium chloride 0.9%. 1000 milliLiter(s) IV Continuous <Continuous>  warfarin 2.5 milliGRAM(s) Oral once      TELEMETRY: SR    T(C): 36.7 (09-30-19 @ 08:40), Max: 36.7 (09-30-19 @ 08:40)  HR: 65 (09-30-19 @ 11:35) (65 - 66)  BP: 114/66 (09-30-19 @ 11:35) (110/64 - 115/59)  RR: 18 (09-30-19 @ 11:35) (18 - 20)  SpO2: 96% (09-30-19 @ 11:35) (93% - 98%)  Wt(kg): --    PHYSICAL EXAM:  Appearance: Normal	  HEENT:   Normal oral mucosa, PERRL  Cardiovascular: Normal S1 S2, No JVD, No murmurs, No edema  Respiratory: Lungs clear to auscultation, decreased b/l bases.  On 2L  Psychiatry: A & O x 3, Mood & affect appropriate  Neurologic: Non-focal, A&O X3.  No neuro deficits  Procedure Site: Right groin sheath #9/#10 venous site benign.  No bleeding/hematoma/ecchymosis. + palp pedal pulse    12 lead EKG:  	SR with 1st degreee HB 63 bpm.  No acute changes      PROCEDURE RESULTS: S/P successful PFO closure.  Able to decrease to 2L NC.     ASSESSMENT/PLAN: 	  -Groin precautions  -D/C groin sheath when ACT wnl  -Bedrest   -Resume home meds except d/c ASA (per Dr. Hanna)  -Initiate plavix (loading dose to be given in cath holding)  -Follow up with Dr. Hearn  -Check labs/EKG/site check in AM  -Probable discharge in AM

## 2019-09-30 NOTE — DISCHARGE NOTE PROVIDER - CARE PROVIDER_API CALL
Gabriel Hearn)  Cardiovascular Disease  [address] [facility] [address] [facility], [place] [address]  Phone: [phone]  Fax: [phone]  Follow Up Time:

## 2019-09-30 NOTE — DISCHARGE NOTE PROVIDER - NSDCCPTREATMENT_GEN_ALL_CORE_FT
PRINCIPAL PROCEDURE  Procedure: Repair of patent foramen ovale (PFO) in adult  Findings and Treatment:

## 2019-09-30 NOTE — DISCHARGE NOTE PROVIDER - NSDCCPCAREPLAN_GEN_ALL_CORE_FT
PRINCIPAL DISCHARGE DIAGNOSIS  Diagnosis: PFO (patent foramen ovale)  Assessment and Plan of Treatment:

## 2019-10-01 ENCOUNTER — TRANSCRIPTION ENCOUNTER (OUTPATIENT)
Age: 79
End: 2019-10-01

## 2019-10-01 VITALS
RESPIRATION RATE: 18 BRPM | SYSTOLIC BLOOD PRESSURE: 93 MMHG | OXYGEN SATURATION: 95 % | DIASTOLIC BLOOD PRESSURE: 57 MMHG | HEART RATE: 105 BPM

## 2019-10-01 PROBLEM — I34.0 NONRHEUMATIC MITRAL (VALVE) INSUFFICIENCY: Chronic | Status: ACTIVE | Noted: 2019-09-23

## 2019-10-01 PROBLEM — I27.20 PULMONARY HYPERTENSION, UNSPECIFIED: Chronic | Status: ACTIVE | Noted: 2019-09-20

## 2019-10-01 PROBLEM — R29.6 REPEATED FALLS: Chronic | Status: ACTIVE | Noted: 2019-09-20

## 2019-10-01 PROBLEM — I35.1 NONRHEUMATIC AORTIC (VALVE) INSUFFICIENCY: Chronic | Status: ACTIVE | Noted: 2019-09-23

## 2019-10-01 PROBLEM — I63.9 CEREBRAL INFARCTION, UNSPECIFIED: Chronic | Status: ACTIVE | Noted: 2019-09-20

## 2019-10-01 PROBLEM — E55.9 VITAMIN D DEFICIENCY, UNSPECIFIED: Chronic | Status: ACTIVE | Noted: 2019-09-20

## 2019-10-01 PROBLEM — I51.7 CARDIOMEGALY: Chronic | Status: ACTIVE | Noted: 2019-09-23

## 2019-10-01 PROBLEM — F32.9 MAJOR DEPRESSIVE DISORDER, SINGLE EPISODE, UNSPECIFIED: Chronic | Status: ACTIVE | Noted: 2019-09-20

## 2019-10-01 PROBLEM — Q21.1 ATRIAL SEPTAL DEFECT: Chronic | Status: ACTIVE | Noted: 2019-09-20

## 2019-10-01 PROBLEM — J18.9 PNEUMONIA, UNSPECIFIED ORGANISM: Chronic | Status: ACTIVE | Noted: 2019-09-20

## 2019-10-01 PROBLEM — Z99.81 DEPENDENCE ON SUPPLEMENTAL OXYGEN: Chronic | Status: ACTIVE | Noted: 2019-09-20

## 2019-10-01 PROBLEM — A41.9 SEPSIS, UNSPECIFIED ORGANISM: Chronic | Status: ACTIVE | Noted: 2019-05-30

## 2019-10-01 PROBLEM — R09.02 HYPOXEMIA: Chronic | Status: ACTIVE | Noted: 2019-09-23

## 2019-10-01 PROBLEM — I34.0 NONRHEUMATIC MITRAL (VALVE) INSUFFICIENCY: Chronic | Status: ACTIVE | Noted: 2019-09-20

## 2019-10-01 PROBLEM — Z86.79 PERSONAL HISTORY OF OTHER DISEASES OF THE CIRCULATORY SYSTEM: Chronic | Status: ACTIVE | Noted: 2019-09-20

## 2019-10-01 PROBLEM — I26.99 OTHER PULMONARY EMBOLISM WITHOUT ACUTE COR PULMONALE: Chronic | Status: ACTIVE | Noted: 2019-09-20

## 2019-10-01 PROBLEM — I07.1 RHEUMATIC TRICUSPID INSUFFICIENCY: Chronic | Status: ACTIVE | Noted: 2019-09-20

## 2019-10-01 LAB
ANION GAP SERPL CALC-SCNC: 12 MMOL/L — SIGNIFICANT CHANGE UP (ref 5–17)
BUN SERPL-MCNC: 17 MG/DL — SIGNIFICANT CHANGE UP (ref 8–20)
CALCIUM SERPL-MCNC: 8.9 MG/DL — SIGNIFICANT CHANGE UP (ref 8.6–10.2)
CHLORIDE SERPL-SCNC: 101 MMOL/L — SIGNIFICANT CHANGE UP (ref 98–107)
CO2 SERPL-SCNC: 26 MMOL/L — SIGNIFICANT CHANGE UP (ref 22–29)
CREAT SERPL-MCNC: 0.67 MG/DL — SIGNIFICANT CHANGE UP (ref 0.5–1.3)
GLUCOSE SERPL-MCNC: 89 MG/DL — SIGNIFICANT CHANGE UP (ref 70–115)
HCT VFR BLD CALC: 36.9 % — LOW (ref 39–50)
HGB BLD-MCNC: 11.9 G/DL — LOW (ref 13–17)
INR BLD: 1.11 RATIO — SIGNIFICANT CHANGE UP (ref 0.88–1.16)
MCHC RBC-ENTMCNC: 30.6 PG — SIGNIFICANT CHANGE UP (ref 27–34)
MCHC RBC-ENTMCNC: 32.2 GM/DL — SIGNIFICANT CHANGE UP (ref 32–36)
MCV RBC AUTO: 94.9 FL — SIGNIFICANT CHANGE UP (ref 80–100)
PLATELET # BLD AUTO: 199 K/UL — SIGNIFICANT CHANGE UP (ref 150–400)
POTASSIUM SERPL-MCNC: 3.8 MMOL/L — SIGNIFICANT CHANGE UP (ref 3.5–5.3)
POTASSIUM SERPL-SCNC: 3.8 MMOL/L — SIGNIFICANT CHANGE UP (ref 3.5–5.3)
PROTHROM AB SERPL-ACNC: 12.8 SEC — SIGNIFICANT CHANGE UP (ref 10–12.9)
RBC # BLD: 3.89 M/UL — LOW (ref 4.2–5.8)
RBC # FLD: 14.6 % — HIGH (ref 10.3–14.5)
SODIUM SERPL-SCNC: 139 MMOL/L — SIGNIFICANT CHANGE UP (ref 135–145)
WBC # BLD: 5.31 K/UL — SIGNIFICANT CHANGE UP (ref 3.8–10.5)
WBC # FLD AUTO: 5.31 K/UL — SIGNIFICANT CHANGE UP (ref 3.8–10.5)

## 2019-10-01 PROCEDURE — C1889: CPT

## 2019-10-01 PROCEDURE — C1759: CPT

## 2019-10-01 PROCEDURE — 93662 INTRACARDIAC ECG (ICE): CPT

## 2019-10-01 PROCEDURE — C1817: CPT

## 2019-10-01 PROCEDURE — C1769: CPT

## 2019-10-01 PROCEDURE — 85027 COMPLETE CBC AUTOMATED: CPT

## 2019-10-01 PROCEDURE — 93580 TRANSCATH CLOSURE OF ASD: CPT

## 2019-10-01 PROCEDURE — C1894: CPT

## 2019-10-01 PROCEDURE — 80048 BASIC METABOLIC PNL TOTAL CA: CPT

## 2019-10-01 PROCEDURE — 93005 ELECTROCARDIOGRAM TRACING: CPT

## 2019-10-01 PROCEDURE — 93308 TTE F-UP OR LMTD: CPT

## 2019-10-01 PROCEDURE — 36415 COLL VENOUS BLD VENIPUNCTURE: CPT

## 2019-10-01 PROCEDURE — 85610 PROTHROMBIN TIME: CPT

## 2019-10-01 PROCEDURE — 85730 THROMBOPLASTIN TIME PARTIAL: CPT

## 2019-10-01 RX ORDER — CLOPIDOGREL BISULFATE 75 MG/1
1 TABLET, FILM COATED ORAL
Qty: 90 | Refills: 3
Start: 2019-10-01

## 2019-10-01 NOTE — DISCHARGE NOTE NURSING/CASE MANAGEMENT/SOCIAL WORK - PATIENT PORTAL LINK FT
You can access the FollowMyHealth Patient Portal offered by [facility] by registering at the following website: [email or web address]. By joining [employer]’s FollowMyHealth portal, you will also be able to view your health information using other applications (apps) compatible with our system.

## 2019-10-01 NOTE — DISCHARGE NOTE NURSING/CASE MANAGEMENT/SOCIAL WORK - NSDCPEPTSTRK_GEN_ALL_CORE
Call 911 for stroke/Risk factors for stroke/Stroke support groups for patients, families, and friends/Stroke warning signs and symptoms/Signs and symptoms of stroke/Need for follow up after discharge/Prescribed medications/Stroke education booklet

## 2019-10-08 ENCOUNTER — APPOINTMENT (OUTPATIENT)
Dept: CARDIOLOGY | Facility: CLINIC | Age: 79
End: 2019-10-08
Payer: MEDICARE

## 2019-10-08 ENCOUNTER — MEDICATION RENEWAL (OUTPATIENT)
Age: 79
End: 2019-10-08

## 2019-10-08 DIAGNOSIS — I63.9 CEREBRAL INFARCTION, UNSPECIFIED: ICD-10-CM

## 2019-10-08 DIAGNOSIS — I48.91 UNSPECIFIED ATRIAL FIBRILLATION: ICD-10-CM

## 2019-10-08 DIAGNOSIS — Q21.1 ATRIAL SEPTAL DEFECT: ICD-10-CM

## 2019-10-08 PROCEDURE — 99213 OFFICE O/P EST LOW 20 MIN: CPT

## 2019-10-08 RX ORDER — ATORVASTATIN CALCIUM 40 MG/1
40 TABLET, FILM COATED ORAL
Refills: 0 | Status: ACTIVE | COMMUNITY
Start: 2019-07-09

## 2019-10-08 RX ORDER — CLOPIDOGREL BISULFATE 75 MG/1
75 TABLET, FILM COATED ORAL
Qty: 30 | Refills: 5 | Status: ACTIVE | COMMUNITY
Start: 2019-10-08

## 2019-11-01 ENCOUNTER — APPOINTMENT (OUTPATIENT)
Dept: CARDIOLOGY | Facility: CLINIC | Age: 79
End: 2019-11-01
Payer: MEDICARE

## 2019-11-01 ENCOUNTER — NON-APPOINTMENT (OUTPATIENT)
Age: 79
End: 2019-11-01

## 2019-11-01 VITALS
WEIGHT: 151 LBS | SYSTOLIC BLOOD PRESSURE: 93 MMHG | BODY MASS INDEX: 22.88 KG/M2 | RESPIRATION RATE: 18 BRPM | HEIGHT: 68 IN | HEART RATE: 62 BPM | DIASTOLIC BLOOD PRESSURE: 53 MMHG | OXYGEN SATURATION: 78 %

## 2019-11-01 VITALS — OXYGEN SATURATION: 83 %

## 2019-11-01 DIAGNOSIS — I07.1 RHEUMATIC TRICUSPID INSUFFICIENCY: ICD-10-CM

## 2019-11-01 DIAGNOSIS — I27.20 PULMONARY HYPERTENSION, UNSPECIFIED: ICD-10-CM

## 2019-11-01 DIAGNOSIS — I34.0 NONRHEUMATIC MITRAL (VALVE) INSUFFICIENCY: ICD-10-CM

## 2019-11-01 PROCEDURE — 99214 OFFICE O/P EST MOD 30 MIN: CPT

## 2019-11-01 PROCEDURE — 93000 ELECTROCARDIOGRAM COMPLETE: CPT

## 2019-11-01 NOTE — PHYSICAL EXAM
[Well Groomed] : well groomed [General Appearance - In No Acute Distress] : no acute distress [Normal Oral Mucosa] : normal oral mucosa [Auscultation Breath Sounds / Voice Sounds] : lungs were clear to auscultation bilaterally [Heart Rate And Rhythm] : heart rate and rhythm were normal [Heart Sounds] : normal S1 and S2 [Arterial Pulses Normal] : the arterial pulses were normal [FreeTextEntry1] : 3/6 sm lsb, radiates to axilla, RV heave  [Bowel Sounds] : normal bowel sounds [Abdomen Soft] : soft [Nail Clubbing] : no clubbing of the fingernails [Oriented To Time, Place, And Person] : oriented to person, place, and time [Affect] : the affect was normal

## 2019-11-01 NOTE — REVIEW OF SYSTEMS
[Recent Weight Gain (___ Lbs)] : no recent weight gain [Feeling Fatigued] : feeling fatigued [Recent Weight Loss (___ Lbs)] : no recent weight loss [Discharge From The Ears] : no discharge from the ears [Mouth Sores] : no mouth sores [Sore Throat] : no sore throat [Shortness Of Breath] : no shortness of breath [Dyspnea on exertion] : dyspnea during exertion [Chest  Pressure] : no chest pressure [Chest Pain] : no chest pain [Lower Ext Edema] : no extremity edema [Palpitations] : no palpitations [Cough] : cough [Wheezing] : no wheezing [Nausea] : no nausea [Change in Appetite] : no change in appetite [Dysphagia] : no dysphagia [Urinary Frequency] : no change in urinary frequency [Impotence] : no impotence [Confusion] : no confusion was observed [Anxiety] : no anxiety [Excessive Thirst] : no polydipsia [Easy Bleeding] : no tendency for easy bleeding [Easy Bruising] : no tendency for easy bruising

## 2019-11-01 NOTE — HISTORY OF PRESENT ILLNESS
[FreeTextEntry1] : 78 yo male with severe pulmonary HTN seen in the hospital after a mechanical fall. Pt was seen by neurosurgery. He was on warfarin due to prior CVA and afib. His history is significant for a large PFO, severe TR and pulmonary hypertension. \par \par RHC:\par LA: 10/6/5\par PA: 75/26/48\par PCWP 48/16/10\par RA: 12/11/10\par RV 79/16\par PVR= 10 HERNANDEZ. \par QP/Qs 3.2/1.87=1.7.\par \par VQ scan was negative. He is s/p ASD closure by Dr Hanna.\par He has tolerated Adempas 2.5 mg tid. Still remains on o2, 4l per minutes. No change in dyspnea, no new CVA.\par No syncope or cp. \par EKG with normal sinus rhythm indeterminate axis, Evidence of RVH.

## 2019-11-01 NOTE — ASSESSMENT
[FreeTextEntry1] : 79-year-old female with moderate mitral regurgitation, severe tricuspid regurgitation and pulmonary hypertension, history of CVA and PFO which was closed percutaneously due to prior stroke and right heart dilation.  Patient is being treated with adempass for pulmonary htn\par he is on AC as well for PAD\par no sign of volume overload\par Repeat TTE, in the next month or so\par Pt will see me in 2-3 months

## 2019-11-13 PROBLEM — I63.9 CVA (CEREBRAL VASCULAR ACCIDENT): Status: ACTIVE | Noted: 2019-07-09

## 2019-11-13 PROBLEM — I48.91 ATRIAL FIBRILLATION, UNSPECIFIED TYPE: Status: ACTIVE | Noted: 2019-07-09

## 2019-11-13 PROBLEM — Q21.1 PFO (PATENT FORAMEN OVALE): Status: ACTIVE | Noted: 2019-07-09

## 2019-11-13 NOTE — HISTORY OF PRESENT ILLNESS
[FreeTextEntry1] : 80 yo male with severe pulmonary HTN seen in the hospital after a mechanical fall. Pt was seen by neurosurgery. He was on warfarin due to prior CVA and afib. His history is significant for a large PFO, severe TR and pulmonary hypertension. \par \par RHC:\par LA: 10/6/5\par PA: 75/26/48\par PCWP 48/16/10\par RA: 12/11/10\par RV 79/16\par PVR= 10 HERNANDEZ. \par QP/Qs 3.2/1.87=1.7.\par \par Patient seen by Dr. Lofton for valvular pathology. Does not think that patient is a candidate. Referred to me by Dr. Palacios for PFO closure due to significant right to left shunt and poor oxygenation. \par \par 10/2019- Stable post pfo closure.

## 2019-11-13 NOTE — DISCUSSION/SUMMARY
[FreeTextEntry1] : Patient s/p PFO closure. \par Has had decreased O2 requirement since procedure. \par Patient stable and will follow up with Dr. Hearn for pulmonary hypertension management.

## 2019-11-13 NOTE — PHYSICAL EXAM
[General Appearance - Well Developed] : well developed [Normal Conjunctiva] : the conjunctiva exhibited no abnormalities [Normal Oral Mucosa] : normal oral mucosa [Bowel Sounds] : normal bowel sounds [Normal Jugular Venous V Waves Present] : normal jugular venous V waves present [] : no respiratory distress [Abdomen Soft] : soft [Nail Clubbing] : no clubbing of the fingernails [Skin Color & Pigmentation] : normal skin color and pigmentation [Oriented To Time, Place, And Person] : oriented to person, place, and time [FreeTextEntry1] : limited gait

## 2019-12-04 ENCOUNTER — APPOINTMENT (OUTPATIENT)
Dept: CARDIOLOGY | Facility: CLINIC | Age: 79
End: 2019-12-04
Payer: MEDICARE

## 2019-12-04 PROCEDURE — 93308 TTE F-UP OR LMTD: CPT

## 2019-12-04 PROCEDURE — 96374 THER/PROPH/DIAG INJ IV PUSH: CPT | Mod: 59

## 2020-01-08 ENCOUNTER — MEDICATION RENEWAL (OUTPATIENT)
Age: 80
End: 2020-01-08

## 2020-03-02 ENCOUNTER — APPOINTMENT (OUTPATIENT)
Dept: CARDIOLOGY | Facility: CLINIC | Age: 80
End: 2020-03-02

## 2020-08-02 RX ORDER — RIOCIGUAT 2.5 MG/1
2.5 TABLET, FILM COATED ORAL
Qty: 90 | Refills: 1 | Status: ACTIVE | COMMUNITY
Start: 2020-07-30 | End: 1900-01-01

## 2020-10-23 ENCOUNTER — NON-APPOINTMENT (OUTPATIENT)
Age: 80
End: 2020-10-23
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
## 2021-04-07 ENCOUNTER — INPATIENT (INPATIENT)
Facility: HOSPITAL | Age: 81
LOS: 5 days | Discharge: HOME | End: 2021-04-13
Admitting: STUDENT IN AN ORGANIZED HEALTH CARE EDUCATION/TRAINING PROGRAM
Payer: MEDICARE

## 2021-04-07 ENCOUNTER — OUTPATIENT (OUTPATIENT)
Dept: OUTPATIENT SERVICES | Facility: HOSPITAL | Age: 81
LOS: 1 days | End: 2021-04-07

## 2021-04-07 DIAGNOSIS — Z96.641 PRESENCE OF RIGHT ARTIFICIAL HIP JOINT: Chronic | ICD-10-CM

## 2021-04-07 DIAGNOSIS — Z98.890 OTHER SPECIFIED POSTPROCEDURAL STATES: Chronic | ICD-10-CM

## 2021-04-07 DIAGNOSIS — Z83.6 FAMILY HISTORY OF OTHER DISEASES OF THE RESPIRATORY SYSTEM: Chronic | ICD-10-CM

## 2021-04-07 DIAGNOSIS — S11.91XA LACERATION WITHOUT FOREIGN BODY OF UNSPECIFIED PART OF NECK, INITIAL ENCOUNTER: Chronic | ICD-10-CM

## 2021-04-07 PROCEDURE — 93010 ELECTROCARDIOGRAM REPORT: CPT

## 2021-04-07 PROCEDURE — 99285 EMERGENCY DEPT VISIT HI MDM: CPT | Mod: CS

## 2021-04-07 PROCEDURE — 71045 X-RAY EXAM CHEST 1 VIEW: CPT | Mod: 26

## 2021-04-08 ENCOUNTER — OUTPATIENT (OUTPATIENT)
Dept: OUTPATIENT SERVICES | Facility: HOSPITAL | Age: 81
LOS: 1 days | End: 2021-04-08

## 2021-04-08 DIAGNOSIS — Z96.641 PRESENCE OF RIGHT ARTIFICIAL HIP JOINT: Chronic | ICD-10-CM

## 2021-04-08 DIAGNOSIS — Z98.890 OTHER SPECIFIED POSTPROCEDURAL STATES: Chronic | ICD-10-CM

## 2021-04-08 DIAGNOSIS — S11.91XA LACERATION WITHOUT FOREIGN BODY OF UNSPECIFIED PART OF NECK, INITIAL ENCOUNTER: Chronic | ICD-10-CM

## 2021-04-08 DIAGNOSIS — Z83.6 FAMILY HISTORY OF OTHER DISEASES OF THE RESPIRATORY SYSTEM: Chronic | ICD-10-CM

## 2021-04-08 PROCEDURE — 93306 TTE W/DOPPLER COMPLETE: CPT | Mod: 26

## 2021-04-08 PROCEDURE — 71045 X-RAY EXAM CHEST 1 VIEW: CPT | Mod: 26

## 2021-04-08 PROCEDURE — 93010 ELECTROCARDIOGRAM REPORT: CPT

## 2021-04-09 ENCOUNTER — OUTPATIENT (OUTPATIENT)
Dept: OUTPATIENT SERVICES | Facility: HOSPITAL | Age: 81
LOS: 1 days | End: 2021-04-09

## 2021-04-09 DIAGNOSIS — Z98.890 OTHER SPECIFIED POSTPROCEDURAL STATES: Chronic | ICD-10-CM

## 2021-04-09 DIAGNOSIS — Z83.6 FAMILY HISTORY OF OTHER DISEASES OF THE RESPIRATORY SYSTEM: Chronic | ICD-10-CM

## 2021-04-09 DIAGNOSIS — Z96.641 PRESENCE OF RIGHT ARTIFICIAL HIP JOINT: Chronic | ICD-10-CM

## 2021-04-09 DIAGNOSIS — S11.91XA LACERATION WITHOUT FOREIGN BODY OF UNSPECIFIED PART OF NECK, INITIAL ENCOUNTER: Chronic | ICD-10-CM

## 2021-04-09 PROCEDURE — 71045 X-RAY EXAM CHEST 1 VIEW: CPT | Mod: 26

## 2021-04-10 ENCOUNTER — OUTPATIENT (OUTPATIENT)
Dept: OUTPATIENT SERVICES | Facility: HOSPITAL | Age: 81
LOS: 1 days | End: 2021-04-10

## 2021-04-10 DIAGNOSIS — S11.91XA LACERATION WITHOUT FOREIGN BODY OF UNSPECIFIED PART OF NECK, INITIAL ENCOUNTER: Chronic | ICD-10-CM

## 2021-04-10 DIAGNOSIS — Z96.641 PRESENCE OF RIGHT ARTIFICIAL HIP JOINT: Chronic | ICD-10-CM

## 2021-04-10 DIAGNOSIS — Z83.6 FAMILY HISTORY OF OTHER DISEASES OF THE RESPIRATORY SYSTEM: Chronic | ICD-10-CM

## 2021-04-10 DIAGNOSIS — Z98.890 OTHER SPECIFIED POSTPROCEDURAL STATES: Chronic | ICD-10-CM

## 2021-04-10 PROCEDURE — 71260 CT THORAX DX C+: CPT | Mod: 26

## 2021-04-10 PROCEDURE — 74177 CT ABD & PELVIS W/CONTRAST: CPT | Mod: 26

## 2021-04-11 ENCOUNTER — OUTPATIENT (OUTPATIENT)
Dept: OUTPATIENT SERVICES | Facility: HOSPITAL | Age: 81
LOS: 1 days | End: 2021-04-11

## 2021-04-11 DIAGNOSIS — Z98.890 OTHER SPECIFIED POSTPROCEDURAL STATES: Chronic | ICD-10-CM

## 2021-04-11 DIAGNOSIS — S11.91XA LACERATION WITHOUT FOREIGN BODY OF UNSPECIFIED PART OF NECK, INITIAL ENCOUNTER: Chronic | ICD-10-CM

## 2021-04-11 DIAGNOSIS — Z83.6 FAMILY HISTORY OF OTHER DISEASES OF THE RESPIRATORY SYSTEM: Chronic | ICD-10-CM

## 2021-04-11 DIAGNOSIS — Z96.641 PRESENCE OF RIGHT ARTIFICIAL HIP JOINT: Chronic | ICD-10-CM

## 2021-04-12 ENCOUNTER — OUTPATIENT (OUTPATIENT)
Dept: OUTPATIENT SERVICES | Facility: HOSPITAL | Age: 81
LOS: 1 days | End: 2021-04-12

## 2021-04-12 DIAGNOSIS — Z98.890 OTHER SPECIFIED POSTPROCEDURAL STATES: Chronic | ICD-10-CM

## 2021-04-12 DIAGNOSIS — S11.91XA LACERATION WITHOUT FOREIGN BODY OF UNSPECIFIED PART OF NECK, INITIAL ENCOUNTER: Chronic | ICD-10-CM

## 2021-04-12 DIAGNOSIS — Z96.641 PRESENCE OF RIGHT ARTIFICIAL HIP JOINT: Chronic | ICD-10-CM

## 2021-04-12 DIAGNOSIS — Z83.6 FAMILY HISTORY OF OTHER DISEASES OF THE RESPIRATORY SYSTEM: Chronic | ICD-10-CM

## 2021-04-13 ENCOUNTER — OUTPATIENT (OUTPATIENT)
Dept: OUTPATIENT SERVICES | Facility: HOSPITAL | Age: 81
LOS: 1 days | End: 2021-04-13

## 2021-04-13 DIAGNOSIS — Z98.890 OTHER SPECIFIED POSTPROCEDURAL STATES: Chronic | ICD-10-CM

## 2021-04-13 DIAGNOSIS — S11.91XA LACERATION WITHOUT FOREIGN BODY OF UNSPECIFIED PART OF NECK, INITIAL ENCOUNTER: Chronic | ICD-10-CM

## 2021-04-13 DIAGNOSIS — Z96.641 PRESENCE OF RIGHT ARTIFICIAL HIP JOINT: Chronic | ICD-10-CM

## 2021-04-13 DIAGNOSIS — Z83.6 FAMILY HISTORY OF OTHER DISEASES OF THE RESPIRATORY SYSTEM: Chronic | ICD-10-CM

## 2021-05-22 ENCOUNTER — INPATIENT (INPATIENT)
Facility: HOSPITAL | Age: 81
LOS: 3 days | Discharge: HOME | End: 2021-05-26
Attending: FAMILY MEDICINE | Admitting: STUDENT IN AN ORGANIZED HEALTH CARE EDUCATION/TRAINING PROGRAM
Payer: MEDICARE

## 2021-05-22 ENCOUNTER — OUTPATIENT (OUTPATIENT)
Dept: OUTPATIENT SERVICES | Facility: HOSPITAL | Age: 81
LOS: 1 days | End: 2021-05-22

## 2021-05-22 DIAGNOSIS — S11.91XA LACERATION WITHOUT FOREIGN BODY OF UNSPECIFIED PART OF NECK, INITIAL ENCOUNTER: Chronic | ICD-10-CM

## 2021-05-22 DIAGNOSIS — Z98.890 OTHER SPECIFIED POSTPROCEDURAL STATES: Chronic | ICD-10-CM

## 2021-05-22 DIAGNOSIS — Z96.641 PRESENCE OF RIGHT ARTIFICIAL HIP JOINT: Chronic | ICD-10-CM

## 2021-05-22 DIAGNOSIS — Z83.6 FAMILY HISTORY OF OTHER DISEASES OF THE RESPIRATORY SYSTEM: Chronic | ICD-10-CM

## 2021-05-22 PROCEDURE — 99285 EMERGENCY DEPT VISIT HI MDM: CPT

## 2021-05-22 PROCEDURE — 71045 X-RAY EXAM CHEST 1 VIEW: CPT | Mod: 26

## 2021-05-22 PROCEDURE — 93010 ELECTROCARDIOGRAM REPORT: CPT

## 2021-05-23 ENCOUNTER — OUTPATIENT (OUTPATIENT)
Dept: OUTPATIENT SERVICES | Facility: HOSPITAL | Age: 81
LOS: 1 days | End: 2021-05-23

## 2021-05-23 DIAGNOSIS — Z83.6 FAMILY HISTORY OF OTHER DISEASES OF THE RESPIRATORY SYSTEM: Chronic | ICD-10-CM

## 2021-05-23 DIAGNOSIS — Z98.890 OTHER SPECIFIED POSTPROCEDURAL STATES: Chronic | ICD-10-CM

## 2021-05-23 DIAGNOSIS — Z96.641 PRESENCE OF RIGHT ARTIFICIAL HIP JOINT: Chronic | ICD-10-CM

## 2021-05-23 DIAGNOSIS — S11.91XA LACERATION WITHOUT FOREIGN BODY OF UNSPECIFIED PART OF NECK, INITIAL ENCOUNTER: Chronic | ICD-10-CM

## 2021-05-24 ENCOUNTER — OUTPATIENT (OUTPATIENT)
Dept: OUTPATIENT SERVICES | Facility: HOSPITAL | Age: 81
LOS: 1 days | End: 2021-05-24

## 2021-05-24 DIAGNOSIS — Z98.890 OTHER SPECIFIED POSTPROCEDURAL STATES: Chronic | ICD-10-CM

## 2021-05-24 DIAGNOSIS — Z83.6 FAMILY HISTORY OF OTHER DISEASES OF THE RESPIRATORY SYSTEM: Chronic | ICD-10-CM

## 2021-05-24 DIAGNOSIS — Z96.641 PRESENCE OF RIGHT ARTIFICIAL HIP JOINT: Chronic | ICD-10-CM

## 2021-05-24 DIAGNOSIS — S11.91XA LACERATION WITHOUT FOREIGN BODY OF UNSPECIFIED PART OF NECK, INITIAL ENCOUNTER: Chronic | ICD-10-CM

## 2021-05-24 PROCEDURE — 71045 X-RAY EXAM CHEST 1 VIEW: CPT | Mod: 26

## 2021-05-25 ENCOUNTER — OUTPATIENT (OUTPATIENT)
Dept: OUTPATIENT SERVICES | Facility: HOSPITAL | Age: 81
LOS: 1 days | End: 2021-05-25

## 2021-05-25 DIAGNOSIS — S11.91XA LACERATION WITHOUT FOREIGN BODY OF UNSPECIFIED PART OF NECK, INITIAL ENCOUNTER: Chronic | ICD-10-CM

## 2021-05-25 DIAGNOSIS — Z98.890 OTHER SPECIFIED POSTPROCEDURAL STATES: Chronic | ICD-10-CM

## 2021-05-25 DIAGNOSIS — Z83.6 FAMILY HISTORY OF OTHER DISEASES OF THE RESPIRATORY SYSTEM: Chronic | ICD-10-CM

## 2021-05-25 DIAGNOSIS — Z96.641 PRESENCE OF RIGHT ARTIFICIAL HIP JOINT: Chronic | ICD-10-CM

## 2021-05-26 ENCOUNTER — OUTPATIENT (OUTPATIENT)
Dept: OUTPATIENT SERVICES | Facility: HOSPITAL | Age: 81
LOS: 1 days | End: 2021-05-26

## 2021-05-26 DIAGNOSIS — Z83.6 FAMILY HISTORY OF OTHER DISEASES OF THE RESPIRATORY SYSTEM: Chronic | ICD-10-CM

## 2021-05-26 DIAGNOSIS — Z98.890 OTHER SPECIFIED POSTPROCEDURAL STATES: Chronic | ICD-10-CM

## 2021-05-26 DIAGNOSIS — Z96.641 PRESENCE OF RIGHT ARTIFICIAL HIP JOINT: Chronic | ICD-10-CM

## 2021-05-26 DIAGNOSIS — S11.91XA LACERATION WITHOUT FOREIGN BODY OF UNSPECIFIED PART OF NECK, INITIAL ENCOUNTER: Chronic | ICD-10-CM

## 2021-05-26 PROCEDURE — 99285 EMERGENCY DEPT VISIT HI MDM: CPT | Mod: CS

## 2021-05-26 PROCEDURE — 71275 CT ANGIOGRAPHY CHEST: CPT | Mod: 26

## 2021-05-26 PROCEDURE — 71045 X-RAY EXAM CHEST 1 VIEW: CPT | Mod: 26

## 2021-05-26 PROCEDURE — 93010 ELECTROCARDIOGRAM REPORT: CPT

## 2021-05-28 ENCOUNTER — INPATIENT (INPATIENT)
Facility: HOSPITAL | Age: 81
LOS: 5 days | Discharge: HOME | End: 2021-06-03
Admitting: FAMILY MEDICINE
Payer: MEDICARE

## 2021-05-28 ENCOUNTER — OUTPATIENT (OUTPATIENT)
Dept: OUTPATIENT SERVICES | Facility: HOSPITAL | Age: 81
LOS: 1 days | End: 2021-05-28

## 2021-05-28 DIAGNOSIS — Z96.641 PRESENCE OF RIGHT ARTIFICIAL HIP JOINT: Chronic | ICD-10-CM

## 2021-05-28 DIAGNOSIS — Z98.890 OTHER SPECIFIED POSTPROCEDURAL STATES: Chronic | ICD-10-CM

## 2021-05-28 DIAGNOSIS — Z83.6 FAMILY HISTORY OF OTHER DISEASES OF THE RESPIRATORY SYSTEM: Chronic | ICD-10-CM

## 2021-05-28 DIAGNOSIS — S11.91XA LACERATION WITHOUT FOREIGN BODY OF UNSPECIFIED PART OF NECK, INITIAL ENCOUNTER: Chronic | ICD-10-CM

## 2021-05-29 ENCOUNTER — OUTPATIENT (OUTPATIENT)
Dept: OUTPATIENT SERVICES | Facility: HOSPITAL | Age: 81
LOS: 1 days | End: 2021-05-29

## 2021-05-29 DIAGNOSIS — Z98.890 OTHER SPECIFIED POSTPROCEDURAL STATES: Chronic | ICD-10-CM

## 2021-05-29 DIAGNOSIS — Z83.6 FAMILY HISTORY OF OTHER DISEASES OF THE RESPIRATORY SYSTEM: Chronic | ICD-10-CM

## 2021-05-29 DIAGNOSIS — S11.91XA LACERATION WITHOUT FOREIGN BODY OF UNSPECIFIED PART OF NECK, INITIAL ENCOUNTER: Chronic | ICD-10-CM

## 2021-05-29 DIAGNOSIS — Z96.641 PRESENCE OF RIGHT ARTIFICIAL HIP JOINT: Chronic | ICD-10-CM

## 2021-05-30 ENCOUNTER — OUTPATIENT (OUTPATIENT)
Dept: OUTPATIENT SERVICES | Facility: HOSPITAL | Age: 81
LOS: 1 days | End: 2021-05-30

## 2021-05-30 DIAGNOSIS — Z98.890 OTHER SPECIFIED POSTPROCEDURAL STATES: Chronic | ICD-10-CM

## 2021-05-30 DIAGNOSIS — S11.91XA LACERATION WITHOUT FOREIGN BODY OF UNSPECIFIED PART OF NECK, INITIAL ENCOUNTER: Chronic | ICD-10-CM

## 2021-05-30 DIAGNOSIS — Z96.641 PRESENCE OF RIGHT ARTIFICIAL HIP JOINT: Chronic | ICD-10-CM

## 2021-05-30 DIAGNOSIS — Z83.6 FAMILY HISTORY OF OTHER DISEASES OF THE RESPIRATORY SYSTEM: Chronic | ICD-10-CM

## 2021-05-31 ENCOUNTER — OUTPATIENT (OUTPATIENT)
Dept: OUTPATIENT SERVICES | Facility: HOSPITAL | Age: 81
LOS: 1 days | End: 2021-05-31

## 2021-05-31 DIAGNOSIS — Z98.890 OTHER SPECIFIED POSTPROCEDURAL STATES: Chronic | ICD-10-CM

## 2021-05-31 DIAGNOSIS — Z83.6 FAMILY HISTORY OF OTHER DISEASES OF THE RESPIRATORY SYSTEM: Chronic | ICD-10-CM

## 2021-05-31 DIAGNOSIS — Z96.641 PRESENCE OF RIGHT ARTIFICIAL HIP JOINT: Chronic | ICD-10-CM

## 2021-05-31 DIAGNOSIS — S11.91XA LACERATION WITHOUT FOREIGN BODY OF UNSPECIFIED PART OF NECK, INITIAL ENCOUNTER: Chronic | ICD-10-CM

## 2021-06-01 ENCOUNTER — OUTPATIENT (OUTPATIENT)
Dept: OUTPATIENT SERVICES | Facility: HOSPITAL | Age: 81
LOS: 1 days | End: 2021-06-01

## 2021-06-01 DIAGNOSIS — S11.91XA LACERATION WITHOUT FOREIGN BODY OF UNSPECIFIED PART OF NECK, INITIAL ENCOUNTER: Chronic | ICD-10-CM

## 2021-06-01 DIAGNOSIS — Z98.890 OTHER SPECIFIED POSTPROCEDURAL STATES: Chronic | ICD-10-CM

## 2021-06-01 DIAGNOSIS — Z83.6 FAMILY HISTORY OF OTHER DISEASES OF THE RESPIRATORY SYSTEM: Chronic | ICD-10-CM

## 2021-06-01 DIAGNOSIS — Z96.641 PRESENCE OF RIGHT ARTIFICIAL HIP JOINT: Chronic | ICD-10-CM

## 2021-06-02 ENCOUNTER — OUTPATIENT (OUTPATIENT)
Dept: OUTPATIENT SERVICES | Facility: HOSPITAL | Age: 81
LOS: 1 days | End: 2021-06-02

## 2021-06-02 DIAGNOSIS — Z83.6 FAMILY HISTORY OF OTHER DISEASES OF THE RESPIRATORY SYSTEM: Chronic | ICD-10-CM

## 2021-06-02 DIAGNOSIS — Z96.641 PRESENCE OF RIGHT ARTIFICIAL HIP JOINT: Chronic | ICD-10-CM

## 2021-06-02 DIAGNOSIS — S11.91XA LACERATION WITHOUT FOREIGN BODY OF UNSPECIFIED PART OF NECK, INITIAL ENCOUNTER: Chronic | ICD-10-CM

## 2021-06-02 DIAGNOSIS — Z98.890 OTHER SPECIFIED POSTPROCEDURAL STATES: Chronic | ICD-10-CM

## 2021-06-03 ENCOUNTER — OUTPATIENT (OUTPATIENT)
Dept: OUTPATIENT SERVICES | Facility: HOSPITAL | Age: 81
LOS: 1 days | End: 2021-06-03

## 2021-06-03 DIAGNOSIS — Z98.890 OTHER SPECIFIED POSTPROCEDURAL STATES: Chronic | ICD-10-CM

## 2021-06-03 DIAGNOSIS — Z96.641 PRESENCE OF RIGHT ARTIFICIAL HIP JOINT: Chronic | ICD-10-CM

## 2021-06-03 DIAGNOSIS — Z83.6 FAMILY HISTORY OF OTHER DISEASES OF THE RESPIRATORY SYSTEM: Chronic | ICD-10-CM

## 2021-06-03 DIAGNOSIS — S11.91XA LACERATION WITHOUT FOREIGN BODY OF UNSPECIFIED PART OF NECK, INITIAL ENCOUNTER: Chronic | ICD-10-CM

## 2022-01-01 NOTE — PROGRESS NOTE ADULT - SUBJECTIVE AND OBJECTIVE BOX
[facility]                                     Neurology at [place]                                 Emili Padilla, & Gary                                  [address]                                        [place], [place], [address]                                             [phone]    CC: stroke on MRI  HPI: The patient is a 78y Male who presented as a transfer from [facility] for [place] wioth head injury.  UInitial CT head showed possible SAH, subsequent imaging showed calcification, not blood.  he says he tripped and fell, hitting his head in the process. He denies LOC, but was confused after the fall. Subsequent MRI at [facility] showed tiny cerebellar CVA.  neurology is called.    Interval history: no new events. feeling well    ROS neurology: Denies headache or dizziness. Denies weakness/numbness.  Denies speech/language deficits. Denies diplopia/blurred vision.  Denies confusion    MEDICATIONS  (STANDING):  acetaminophen   Tablet .. 650 milliGRAM(s) Oral every 6 hours  aspirin  chewable 81 milliGRAM(s) Oral daily  atorvastatin 40 milliGRAM(s) Oral at bedtime  chlorhexidine 2% Cloths 1 Application(s) Topical <User Schedule>  citalopram 20 milliGRAM(s) Oral daily  docusate sodium 100 milliGRAM(s) Oral every 8 hours  enoxaparin Injectable 70 milliGRAM(s) SubCutaneous every 12 hours  folic acid 1 milliGRAM(s) Oral daily  melatonin 3 milliGRAM(s) Oral at bedtime  metoprolol succinate ER 25 milliGRAM(s) Oral daily  pantoprazole  Injectable 40 milliGRAM(s) IV Push daily  riociguat 0.5 milliGRAM(s) Oral three times a day  senna 2 Tablet(s) Oral at bedtime  thiamine 100 milliGRAM(s) Oral daily  warfarin 5 milliGRAM(s) Oral once    MEDICATIONS  (PRN):  LORazepam   Injectable 2 milliGRAM(s) IV Push every 2 hours PRN CIWA-Ar score increase by 2 points and a total score of 7 or less  ondansetron Injectable 4 milliGRAM(s) IV Push every 6 hours PRN Nausea      Vital Signs Last 24 Hrs  T(C): 36.7 (20 Jun 2019 11:30), Max: 36.9 (19 Jun 2019 15:56)  T(F): 98 (20 Jun 2019 11:30), Max: 98.5 (19 Jun 2019 15:56)  HR: 72 (20 Jun 2019 11:30) (70 - 74)  BP: 108/64 (20 Jun 2019 11:30) (96/60 - 114/72)  BP(mean): --  RR: 22 (20 Jun 2019 11:30) (18 - 22)  SpO2: 91% (20 Jun 2019 11:30) (84% - 92%)      Detailed Neurologic Exam:    Mental status: The patient is awake and alert and has normal attention span.  The patient is fully oriented in 3 spheres. The patient is oriented to current events. The patient is able to name objects, follow commands, repeat sentences.    Cranial nerves: Pupils equal and react symmetrically to light. There is no visual field deficit to confrontation. Extraocular motion is full with no nystagmus. There is no ptosis. Facial sensation is intact. Facial musculature is symmetric. Palate elevates symmetrically. Shoulder shrug is normal. Tongue is midline.    Motor: There is normal bulk and tone.  There is no tremor.  Strength is 5/5 in the right arm and leg.   Strength is 5/5 in the left arm and leg.    Sensation: Intact to light touch and pin in 4 extremities    Reflexes: 2+ throughout and plantar responses are flexor.    Cerebellar: There is no dysmetria on finger to nose testing.    Gait : deferred    LABS:                         12.8   5.8   )-----------( 225      ( 19 Jun 2019 06:25 )             39.8       06-19    141  |  103  |  10.0  ----------------------------<  81  3.8   |  25.0  |  0.57    Ca    9.1      19 Jun 2019 06:25  Phos  3.5     06-19  Mg     2.2     06-19    TPro  5.8<L>  /  Alb  3.3  /  TBili  1.4  /  DBili  0.4<H>  /  AST  27  /  ALT  20  /  AlkPhos  72  06-19    Lipid Profile in AM (06.20.19 @ 06:18)    Total Cholesterol/HDL Ratio Measurement: 3.0 Ratio    Cholesterol, Serum: 96 mg/dL    Triglycerides, Serum: 81 mg/dL    HDL Cholesterol, Serum: 31: HDL Levels >/= 60 mg/dL are considered beneficial and a "negative" risk  factor.  Effective 08/15/2018: New reference range and interpretive comment. mg/dL    Direct LDL: 49:        RADIOLOGY & ADDITIONAL STUDIES (independently reviewed unless otherwise noted):  MRI brain- tiny acute CVA in right cerebellum.  Now blood or mass noted.  (+) SVID    < from: [facility] Echo Doppler (06.14.19 @ 13:16) >  Summary:   1. Normal global left ventricular systolic function. Left ventricular   ejection fraction, by visual estimation, is 50 to 55%.   2. Severely enlarged right ventricle.   3. Severely reduced RV systolic function.   4. Severe tricuspid regurgitation.   5. Severe pulmonary hypertension.   6. Moderate mitral regurgitation.   7. Mild aortic regurgitation.   8. Mild pulmonic insufficiency.   9. Continuous right to left shunting through a large patent foramen   ovale.  10. No intracardiac thrombus.  11. Dilated pulmonary artery.  12. Nopericardial effusion.  13. ** No prior echocardiograms available for comparison.    LE doppler no DVT, but recent CT angio in May showed PE's [id number] Detailed

## 2023-02-14 NOTE — DISCHARGE NOTE NURSING/CASE MANAGEMENT/SOCIAL WORK - NSDPACMPNY_GEN_ALL_CORE
February 14, 2023    Hello, may I speak with Wilbertkristian Bazzis?    My name is Nilsa      [facility] am  [facility]  [facility]  [address]  [place] [address] [phone].    Before we get started may I verify your date of birth? 1960    I am calling to officially welcome you to our practice and ask about your recent visit. Is this a good time to talk? no    Tell me about your visit with us. What things went well?  liked the doctor very well, visit went good        We're always looking for ways to make our patients' experiences even better. Do you have recommendations on ways we may improve?  no    Overall were you satisfied with your first visit to our practice? yes       I appreciate you taking the time to speak with me today. Is there anything else I can do for you? no      Thank you, and have a great day.       Spouse

## 2023-04-18 NOTE — H&P PST ADULT - MS EXT PE MLT D E PC
clubbing/cyanosis Xeljanz Counseling: I discussed with the patient the risks of Xeljanz therapy including increased risk of infection, liver issues, headache, diarrhea, or cold symptoms. Live vaccines should be avoided. They were instructed to call if they have any problems.

## 2024-04-11 NOTE — H&P PST ADULT - ASSESSMENT
"Pt is accepting of medications without incidence and meal compliant. No further episodes of vomiting this shift. Pt is polite and soft spoken, flat and selectively social with peers. Pt rests in bed most of shift and encouraged to shower as he is wearing same clothing from yesterday. Remains endorsing AH of voices \" threatening me\". No new concerns at this time.   " Received pre-auth from [facility] for pt to have lovenox 60 mg q12 x4 doses beginning Sept 28 and Sept 29  Wife given Rx to present to [facility] in [place] in 72 hours as directed by [facility]. [facility] has the lovenox in stock  Pt will have last dose coumadin on Friday Sept 27  All questions/concerns addressed and answered  Escort for discharge d/w pt Received pre-auth from [facility] for pt to have lovenox 60 mg q12 x4 doses beginning Sept 28 and Sept 29  Wife given Rx to present to [facility] in [place] in 72 hours as directed by [facility]. [facility] has the lovenox in stock  Pt will have last dose coumadin on Friday Sept 27  All questions/concerns addressed and answered  Escort for discharge d/w pt  PFO closure planned

## 2025-05-22 NOTE — OCCUPATIONAL THERAPY INITIAL EVALUATION ADULT - FINE MOTOR COORDINATION, RIGHT HAND, FINGER TO NOSE, OT EVAL
No care due was identified.  [facility] Embedded Care Due Messages. Reference number: [id number].   5/22/2025 10:47:58 AM CDT   normal performance